# Patient Record
Sex: MALE | Race: WHITE | NOT HISPANIC OR LATINO | Employment: FULL TIME | URBAN - METROPOLITAN AREA
[De-identification: names, ages, dates, MRNs, and addresses within clinical notes are randomized per-mention and may not be internally consistent; named-entity substitution may affect disease eponyms.]

---

## 2017-01-12 ENCOUNTER — TELEPHONE (OUTPATIENT)
Dept: CARDIOLOGY | Facility: CLINIC | Age: 52
End: 2017-01-12

## 2017-02-27 DIAGNOSIS — E78.2 MIXED HYPERLIPIDEMIA: ICD-10-CM

## 2017-02-27 DIAGNOSIS — I10 ESSENTIAL HYPERTENSION, BENIGN: ICD-10-CM

## 2017-02-27 RX ORDER — LISINOPRIL 10 MG/1
10 TABLET ORAL DAILY
Qty: 90 TABLET | Refills: 0 | Status: SHIPPED | OUTPATIENT
Start: 2017-02-27 | End: 2017-05-30

## 2017-02-27 RX ORDER — ROSUVASTATIN CALCIUM 20 MG/1
20 TABLET, COATED ORAL DAILY
Qty: 90 TABLET | Refills: 0 | Status: SHIPPED | OUTPATIENT
Start: 2017-02-27 | End: 2017-05-30

## 2017-03-03 ENCOUNTER — HOSPITAL ENCOUNTER (OUTPATIENT)
Dept: CARDIOLOGY | Facility: CLINIC | Age: 52
Discharge: HOME | End: 2017-03-03
Attending: INTERNAL MEDICINE | Admitting: INTERNAL MEDICINE
Payer: COMMERCIAL

## 2017-03-03 DIAGNOSIS — I25.10 CORONARY ARTERY DISEASE INVOLVING NATIVE CORONARY ARTERY OF NATIVE HEART WITHOUT ANGINA PECTORIS: ICD-10-CM

## 2017-03-03 DIAGNOSIS — I10 ESSENTIAL HYPERTENSION: ICD-10-CM

## 2017-03-03 DIAGNOSIS — E78.2 MIXED HYPERLIPIDEMIA: ICD-10-CM

## 2017-03-03 LAB
ALT SERPL W P-5'-P-CCNC: <5 U/L (ref 5–30)
ANION GAP SERPL CALCULATED.3IONS-SCNC: 15 MMOL/L (ref 6–17)
BUN SERPL-MCNC: 12 MG/DL (ref 7–30)
CALCIUM SERPL-MCNC: 9.3 MG/DL (ref 8.5–10.5)
CHLORIDE SERPL-SCNC: 101 MMOL/L (ref 98–107)
CHOLEST SERPL-MCNC: 133 MG/DL
CO2 SERPL-SCNC: 26 MMOL/L (ref 23–29)
CREAT SERPL-MCNC: 1.39 MG/DL (ref 0.7–1.3)
GFR SERPL CREATININE-BSD FRML MDRD: 54 ML/MIN/1.7M2
GLUCOSE SERPL-MCNC: 126 MG/DL (ref 70–105)
HDLC SERPL-MCNC: 39 MG/DL
LDLC SERPL CALC-MCNC: 70 MG/DL
NONHDLC SERPL-MCNC: 94 MG/DL
POTASSIUM SERPL-SCNC: 4 MMOL/L (ref 3.5–5.1)
SODIUM SERPL-SCNC: 138 MMOL/L (ref 136–145)
TRIGL SERPL-MCNC: 120 MG/DL

## 2017-03-03 PROCEDURE — 80061 LIPID PANEL: CPT | Performed by: INTERNAL MEDICINE

## 2017-03-03 PROCEDURE — 93018 CV STRESS TEST I&R ONLY: CPT | Performed by: INTERNAL MEDICINE

## 2017-03-03 PROCEDURE — 93321 DOPPLER ECHO F-UP/LMTD STD: CPT | Mod: 26 | Performed by: INTERNAL MEDICINE

## 2017-03-03 PROCEDURE — 80048 BASIC METABOLIC PNL TOTAL CA: CPT | Performed by: INTERNAL MEDICINE

## 2017-03-03 PROCEDURE — 84460 ALANINE AMINO (ALT) (SGPT): CPT | Performed by: INTERNAL MEDICINE

## 2017-03-03 PROCEDURE — 40000264 ECHO STRESS TEST WITH LUMASON

## 2017-03-03 PROCEDURE — 25500064 ZZH RX 255 OP 636: Performed by: INTERNAL MEDICINE

## 2017-03-03 PROCEDURE — 93325 DOPPLER ECHO COLOR FLOW MAPG: CPT | Mod: 26 | Performed by: INTERNAL MEDICINE

## 2017-03-03 PROCEDURE — 93016 CV STRESS TEST SUPVJ ONLY: CPT | Performed by: INTERNAL MEDICINE

## 2017-03-03 PROCEDURE — 93350 STRESS TTE ONLY: CPT | Mod: 26 | Performed by: INTERNAL MEDICINE

## 2017-03-03 PROCEDURE — 36415 COLL VENOUS BLD VENIPUNCTURE: CPT | Performed by: INTERNAL MEDICINE

## 2017-03-03 RX ADMIN — SULFUR HEXAFLUORIDE 5 ML: KIT at 11:59

## 2017-03-07 ENCOUNTER — OFFICE VISIT (OUTPATIENT)
Dept: CARDIOLOGY | Facility: CLINIC | Age: 52
End: 2017-03-07
Attending: INTERNAL MEDICINE
Payer: COMMERCIAL

## 2017-03-07 VITALS
HEIGHT: 69 IN | SYSTOLIC BLOOD PRESSURE: 126 MMHG | HEART RATE: 92 BPM | DIASTOLIC BLOOD PRESSURE: 88 MMHG | WEIGHT: 252.6 LBS | BODY MASS INDEX: 37.41 KG/M2

## 2017-03-07 DIAGNOSIS — I25.10 CORONARY ARTERY DISEASE INVOLVING NATIVE CORONARY ARTERY OF NATIVE HEART WITHOUT ANGINA PECTORIS: Primary | ICD-10-CM

## 2017-03-07 DIAGNOSIS — E78.2 MIXED HYPERLIPIDEMIA: ICD-10-CM

## 2017-03-07 DIAGNOSIS — I10 ESSENTIAL HYPERTENSION: ICD-10-CM

## 2017-03-07 PROCEDURE — 99214 OFFICE O/P EST MOD 30 MIN: CPT | Performed by: INTERNAL MEDICINE

## 2017-03-07 NOTE — MR AVS SNAPSHOT
"              After Visit Summary   3/7/2017    Ashutosh Moraes    MRN: [id number]           Patient Information     Date Of Birth          1965        Visit Information        Provider Department      3/7/2017 1:15 PM Mirza Baez MD [facility] HEART [facility]        Today's Diagnoses     Coronary artery disease involving native coronary artery of native heart without angina pectoris    -  1    Mixed hyperlipidemia        Essential hypertension           Follow-ups after your visit        Additional Services     Follow-Up with Cardiologist                 Future tests that were ordered for you today     Open Future Orders        Priority Expected Expires Ordered    Lipid Profile Routine 3/7/2018 4/11/2018 3/7/2017    ALT Routine 3/7/2018 4/11/2018 3/7/2017    Follow-Up with Cardiologist Routine 3/7/2018 7/20/2018 3/7/2017            Who to contact     If you have questions or need follow up information about today's clinic visit or your schedule please contact [facility] directly at [phone].  Normal or non-critical lab and imaging results will be communicated to you by [employer], letter or phone within 4 business days after the clinic has received the results. If you do not hear from us within 7 days, please contact the clinic through [employer] or phone. If you have a critical or abnormal lab result, we will notify you by phone as soon as possible.  Submit refill requests through [employer] or call your pharmacy and they will forward the refill request to us. Please allow 3 business days for your refill to be completed.          Additional Information About Your Visit        [employer] Information     [employer] lets you send messages to your doctor, view your test results, renew your prescriptions, schedule appointments and more. To sign up, go to [email or web address] . Click on \"Log in\" on the left side of the screen, which will take you to " "the Welcome page. Then click on \"Sign up Now\" on the right side of the page.     You will be asked to enter the access code listed below, as well as some personal information. Please follow the directions to create your username and password.     Your access code is: [email or web address]  Expires: 2017  1:53 PM     Your access code will  in 90 days. If you need help or a new code, please call your [facility] clinic or [phone].        Care EveryWhere ID     This is your Care EveryWhere ID. This could be used by other organizations to access your [facility] medical records  [id number]        Your Vitals Were     Pulse Height BMI (Body Mass Index)             92 1.753 m (5' 9\") 37.3 kg/m2          Blood Pressure from Last 3 Encounters:   17 126/88   11/04/15 130/80   08/11/15 (!) 159/121    Weight from Last 3 Encounters:   17 114.6 kg (252 lb 9.6 oz)   11/04/15 110.7 kg (244 lb)   08/11/15 108.9 kg (240 lb)              We Performed the Following     Follow-Up with Cardiologist        Primary Care Provider Office Phone # Fax #    Rachel Snow [phone]       [facility] [address] [facility] [address]        Thank you!     Thank you for choosing [facility]  for your care. Our goal is always to provide you with excellent care. Hearing back from our patients is one way we can continue to improve our services. Please take a few minutes to complete the written survey that you may receive in the mail after your visit with us. Thank you!             Your Updated Medication List - Protect others around you: Learn how to safely use, store and throw away your medicines at www.disposemymeds.org.          This list is accurate as of: 3/7/17  1:53 PM.  Always use your most recent med list.                   Brand Name Dispense Instructions for use    ASPIRIN EC PO      Take 81 mg by mouth daily       lisinopril 10 MG tablet    " PRINIVIL/ZESTRIL    90 tablet    Take 1 tablet (10 mg) by mouth daily       PRILOSEC OTC PO      Take by mouth daily       rosuvastatin 20 MG tablet    CRESTOR    90 tablet    Take 1 tablet (20 mg) by mouth daily

## 2017-03-07 NOTE — LETTER
3/7/2017    Rachel Snow  [facility]   [address]  [place] [address]    RE: Ashutosh Cuellarantonio       Dear Colleague,    I had the opportunity to see Mr. Ashutosh Moraes in Cardiology Clinic today at [facility] in [place] for reevaluation of coronary artery disease with risk factors of hypertension and dyslipidemia.  Mr. Moraes developed exertional jaw pain in 2009 and underwent stress testing suggesting ischemia.  He went on to have a coronary angiogram with a drug-eluting stent placed within an obtuse marginal vessel.  He had no other significant disease.  He has done well since that time without any recurrence of his angina.  He has had some mild discomfort in his mouth and teeth a few times which seems to get better when he drinks water.  These episodes do not occur with exertion and seem to occur randomly.  He is wondering whether it might be dehydration.  He has no chest discomfort, shortness of breath, lightheadedness, palpitations or syncope.      He is taking Crestor 20 mg a day, and his cholesterol numbers are excellent with an LDL of 70, HDL 39, triglycerides 120.  I am a bit concerned about his creatinine, which has jumped up to 1.39 with a BUN of 12 and GFR 54.  I am also concerned about his fasting blood glucose.  He tells me that he fasted for about 12-14 hours, and his glucose was 126.  He goes on to tell me that he drinks at least 2 or 3 sugared sodas a day as well as some [employer]ley[employer] and another sugared drinks at times.  He also eats candy bars and such for snacks while he is at work.  He also likes his cakes and pies and other desserts.  He has a very irregular work schedule which seems to disrupt his eating patterns.  He also has been struggling to get enough exercise but is more active on his job.      PHYSICAL EXAMINATION:  Today his weight is up to 252 pounds, which is up 8 pounds from last year.  His heart rate is 92 and blood pressure 126/88.  His  lungs are clear.  His heart rhythm is regular.  He has no cardiac murmurs or carotid bruits.     Outpatient Encounter Prescriptions as of 3/7/2017   Medication Sig Dispense Refill     Omeprazole Magnesium (PRILOSEC OTC PO) Take by mouth daily       lisinopril (PRINIVIL/ZESTRIL) 10 MG tablet Take 1 tablet (10 mg) by mouth daily 90 tablet 0     rosuvastatin (CRESTOR) 20 MG tablet Take 1 tablet (20 mg) by mouth daily 90 tablet 0     ASPIRIN EC PO Take 81 mg by mouth daily        No facility-administered encounter medications on file as of 3/7/2017.       IMPRESSIONS:  Mr. Ashutosh Moraes is a 51-year-old gentleman with coronary artery disease, hypertension and dyslipidemia.  His blood pressure and cholesterol numbers are well controlled.  Unfortunately, I am concerned that he may be developing diabetes.  His blood glucose is up to 126 and he has some worsening kidney dysfunction.      I strongly encouraged him to eat better, focusing on complex carbohydrates and lean protein rather than such a high-sugar diet.  I am hopeful that he can lose some weight.  He is giving up sugared soda for Lent and hoping to continue that on past Lent as well.  We talked about the relationship between excess weight and development of type 2 diabetes as well as the dietary issues related to diabetes.  I encouraged him to exercise more regularly as well.  I am hopeful that he can modify his lifestyle and reduce his weight enough to help eliminate the issue of diabetes.  I suggested he follow up with you in a couple of months to recheck these issues after he has hydrated better.      I will plan to see him back again myself in 1 year.      Again, thank you for allowing me to participate in the care of your patient.      Sincerely,    DEVON LEE MD     [facility]

## 2017-03-07 NOTE — PROGRESS NOTES
HPI and Plan:   See dictation    Orders Placed This Encounter   Procedures     Lipid Profile     ALT     Follow-Up with Cardiologist       Orders Placed This Encounter   Medications     Omeprazole Magnesium (PRILOSEC OTC PO)     Sig: Take by mouth daily       There are no discontinued medications.      Encounter Diagnoses   Name Primary?     Coronary artery disease involving native coronary artery of native heart without angina pectoris Yes     Mixed hyperlipidemia      Essential hypertension        CURRENT MEDICATIONS:  Current Outpatient Prescriptions   Medication Sig Dispense Refill     Omeprazole Magnesium (PRILOSEC OTC PO) Take by mouth daily       lisinopril (PRINIVIL/ZESTRIL) 10 MG tablet Take 1 tablet (10 mg) by mouth daily 90 tablet 0     rosuvastatin (CRESTOR) 20 MG tablet Take 1 tablet (20 mg) by mouth daily 90 tablet 0     ASPIRIN EC PO Take 81 mg by mouth daily          ALLERGIES     Allergies   Allergen Reactions     Atorvastatin Rash       PAST MEDICAL HISTORY:  Past Medical History   Diagnosis Date     CAD (coronary artery disease)      Cardiac cath 7/2009: RAMANA to OM     Gastro-oesophageal reflux disease      HLD (hyperlipidemia)      Hypertension        PAST SURGICAL HISTORY:  Past Surgical History   Procedure Laterality Date     Heart cath left heart cath  07/02/2009     RAMANA to OM       FAMILY HISTORY:  Family History   Problem Relation Age of Onset     Asthma Mother      Coronary Artery Disease Father        SOCIAL HISTORY:  Social History     Social History     Marital status:      Spouse name: N/A     Number of children: N/A     Years of education: N/A     Social History Main Topics     Smoking status: Never Smoker     Smokeless tobacco: None     Alcohol use No     Drug use: No     Sexual activity: Not Asked     Other Topics Concern     Caffeine Concern No     1 a day     Exercise No     occasional. recently joined ""     Social History Narrative       Review of Systems:  Skin:   "Negative       Eyes:  Positive for glasses    ENT:  Negative      Respiratory:  Positive for sleep apnea;CPAP     Cardiovascular:  Negative;palpitations;chest pain;syncope or near-syncope;cyanosis;lightheadedness;dizziness;exercise intolerance;fatigue;edema      Gastroenterology: Negative      Genitourinary:  Negative      Musculoskeletal:  Negative      Neurologic:  Negative      Psychiatric:  Negative      Heme/Lymph/Imm:  Negative      Endocrine:  Negative        Physical Exam:  Vitals: /88  Pulse 92  Ht 1.753 m (5' 9\")  Wt 114.6 kg (252 lb 9.6 oz)  BMI 37.3 kg/m2    Constitutional:           Skin:           Head:           Eyes:           ENT:           Neck:           Chest:             Cardiac:                    Abdomen:           Vascular:                                          Extremities and Back:                 Neurological:                 CC  Mirza Baez MD   [facility]  [address]  [place] [address]              "

## 2017-03-08 NOTE — PROGRESS NOTES
HISTORY OF PRESENT ILLNESS:  I had the opportunity to see Mr. Ashutosh Moraes in Cardiology Clinic today at [facility] in [place] for reevaluation of coronary artery disease with risk factors of hypertension and dyslipidemia.  Mr. Moraes developed exertional jaw pain in 2009 and underwent stress testing suggesting ischemia.  He went on to have a coronary angiogram with a drug-eluting stent placed within an obtuse marginal vessel.  He had no other significant disease.  He has done well since that time without any recurrence of his angina.  He has had some mild discomfort in his mouth and teeth a few times which seems to get better when he drinks water.  These episodes do not occur with exertion and seem to occur randomly.  He is wondering whether it might be dehydration.  He has no chest discomfort, shortness of breath, lightheadedness, palpitations or syncope.      He is taking Crestor 20 mg a day, and his cholesterol numbers are excellent with an LDL of 70, HDL 39, triglycerides 120.  I am a bit concerned about his creatinine, which has jumped up to 1.39 with a BUN of 12 and GFR 54.  I am also concerned about his fasting blood glucose.  He tells me that he fasted for about 12-14 hours, and his glucose was 126.  He goes on to tell me that he drinks at least 2 or 3 sugared sodas a day as well as some Bentley-A[employer] and another sugared drinks at times.  He also eats candy bars and such for snacks while he is at work.  He also likes his cakes and pies and other desserts.  He has a very irregular work schedule which seems to disrupt his eating patterns.  He also has been struggling to get enough exercise but is more active on his job.      PHYSICAL EXAMINATION:  Today his weight is up to 252 pounds, which is up 8 pounds from last year.  His heart rate is 92 and blood pressure 126/88.  His lungs are clear.  His heart rhythm is regular.  He has no cardiac murmurs or carotid bruits.      IMPRESSIONS:    Ashutosh Bueno is a 51-year-old gentleman with coronary artery disease, hypertension and dyslipidemia.  His blood pressure and cholesterol numbers are well controlled.  Unfortunately, I am concerned that he may be developing diabetes.  His blood glucose is up to 126 and he has some worsening kidney dysfunction.      I strongly encouraged him to eat better, focusing on complex carbohydrates and lean protein rather than such a high-sugar diet.  I am hopeful that he can lose some weight.  He is giving up sugared soda for Lent and hoping to continue that on past Lent as well.  We talked about the relationship between excess weight and development of type 2 diabetes as well as the dietary issues related to diabetes.  I encouraged him to exercise more regularly as well.  I am hopeful that he can modify his lifestyle and reduce his weight enough to help eliminate the issue of diabetes.  I suggested he follow up with you in a couple of months to recheck these issues after he has hydrated better.      I will plan to see him back again myself in 1 year.      cc:   [place] [address]         DEVON LEE MD, [facility]             D: 2017 14:00   T: 2017 23:42   MT: LETY      Name:     ASHUTOSH BUENO   MRN:      [id number]        Account:      [id number]   :      1965           Service Date: 2017      Document: [id number]

## 2017-05-30 DIAGNOSIS — E78.2 MIXED HYPERLIPIDEMIA: ICD-10-CM

## 2017-05-30 DIAGNOSIS — I10 ESSENTIAL HYPERTENSION, BENIGN: ICD-10-CM

## 2017-05-30 RX ORDER — LISINOPRIL 10 MG/1
10 TABLET ORAL DAILY
Qty: 90 TABLET | Refills: 3 | Status: SHIPPED | OUTPATIENT
Start: 2017-05-30 | End: 2018-05-11

## 2017-05-30 RX ORDER — ROSUVASTATIN CALCIUM 20 MG/1
20 TABLET, COATED ORAL DAILY
Qty: 90 TABLET | Refills: 3 | Status: SHIPPED | OUTPATIENT
Start: 2017-05-30 | End: 2018-02-26

## 2018-02-26 DIAGNOSIS — E78.2 MIXED HYPERLIPIDEMIA: ICD-10-CM

## 2018-02-26 RX ORDER — ROSUVASTATIN CALCIUM 20 MG/1
20 TABLET, COATED ORAL DAILY
Qty: 90 TABLET | Refills: 2 | Status: SHIPPED | OUTPATIENT
Start: 2018-02-26 | End: 2018-05-11

## 2018-05-04 ENCOUNTER — TELEPHONE (OUTPATIENT)
Dept: CARDIOLOGY | Facility: CLINIC | Age: 53
End: 2018-05-04

## 2018-05-04 NOTE — TELEPHONE ENCOUNTER
Pt called, he thinks he may need a PA for his generic crestor- [facility] mentioned that to him, however we have not received a fax request. Pt wanted me to send this request for a PA-he has [employer] insurance-message to Central PA Team-mmunns lpn

## 2018-05-07 ENCOUNTER — CARE COORDINATION (OUTPATIENT)
Dept: CARDIOLOGY | Facility: CLINIC | Age: 53
End: 2018-05-07

## 2018-05-07 NOTE — TELEPHONE ENCOUNTER
Central Prior Authorization Team   Phone: [phone]      PA Initiation    Medication: Rosuvastatin Calcium 20MG tablets   Insurance Company: [email or web address] - Phone [phone] Fax [phone]  Pharm[facility] Filling the [id number] - [place], [place] - [address]  Filling Pharmacy Phone: [phone]  Filling Pharmacy Fax:    Start Date: 5/7/2018

## 2018-05-07 NOTE — PROGRESS NOTES
Received denial for rosuvastatin due to the terms of his benefit plan. Pt is due to see  on 5/11/18. Will update  and call patient back with his recommendations. Ingrid Kang RN 3:49 PM 05/07/18

## 2018-05-07 NOTE — TELEPHONE ENCOUNTER
PRIOR AUTHORIZATION DENIED    Medication: Rosuvastatin Calcium 20MG tablets - Denied- Plan Exclusion    Denial Date: 5/7/2018    Denial Rational: Plan Exclusion. Your plan does not cover this drug.   Formulary Alternatives: atorvastatin, ezetimibe-simvastatin, fluvastatin, lovastatin, pravastatin, simvastatin. If you feel there is additional information related to this case that might affect this decision and an appeal is desired, please submit a written letter of medical necessity to our PA Team.        Appeal Information:

## 2018-05-09 NOTE — TELEPHONE ENCOUNTER
Called [facility] pharmacy to find out what the cost is for rosuvastatin 20 mg tabs for 90 day supply. Pharm tech said it is $500.00 for a ninety day supply per patient's plan. Will update  to see if he wants to switch to another statin with lower cost for patient. Ingrid Kang RN 2:18 PM 05/09/18

## 2018-05-10 NOTE — TELEPHONE ENCOUNTER
Contacted patient with 's recommendations with regard to trying atorvastatin or pravastatin due to the high cost of generic Crestor (rosuvastatin). PA for rosuvastatin was denied due to plan exclusion ( not on formulary). I checked with cost at [facility] which was $ 500.00 for a 90 day supply.  called [facility]'s and the cost was $ 575.00 for a ninety day supply. I gave this information to the patient. Pt said he did not tolerate atorvastatin in the past and he would rather discuss this with  at tomorrow's appointment. Ingrid Kang RN 4:40 PM 05/10/18

## 2018-05-10 NOTE — TELEPHONE ENCOUNTER
Ok. I thought it was generic, but that does not mean it's cheap. I double checked and the cash price at [facility] is $575. Fartuny. Let's switch to atorvastatin 40 mg daily. If he has had that before and had problems with it, we will use pravastatin 80 mg daily. Thanks. LATHA

## 2018-05-11 ENCOUNTER — OFFICE VISIT (OUTPATIENT)
Dept: CARDIOLOGY | Facility: CLINIC | Age: 53
End: 2018-05-11
Attending: INTERNAL MEDICINE
Payer: COMMERCIAL

## 2018-05-11 VITALS
HEART RATE: 68 BPM | BODY MASS INDEX: 38.09 KG/M2 | SYSTOLIC BLOOD PRESSURE: 132 MMHG | HEIGHT: 68 IN | DIASTOLIC BLOOD PRESSURE: 96 MMHG | WEIGHT: 251.3 LBS

## 2018-05-11 DIAGNOSIS — I10 ESSENTIAL HYPERTENSION: ICD-10-CM

## 2018-05-11 DIAGNOSIS — I25.10 CORONARY ARTERY DISEASE INVOLVING NATIVE CORONARY ARTERY OF NATIVE HEART WITHOUT ANGINA PECTORIS: Primary | ICD-10-CM

## 2018-05-11 DIAGNOSIS — E78.2 MIXED HYPERLIPIDEMIA: ICD-10-CM

## 2018-05-11 LAB
ALT SERPL W P-5'-P-CCNC: <5 U/L (ref 5–30)
CHOLEST SERPL-MCNC: 165 MG/DL
HDLC SERPL-MCNC: 41 MG/DL
LDLC SERPL CALC-MCNC: 92 MG/DL
NONHDLC SERPL-MCNC: 124 MG/DL
TRIGL SERPL-MCNC: 159 MG/DL

## 2018-05-11 PROCEDURE — 84460 ALANINE AMINO (ALT) (SGPT): CPT | Performed by: INTERNAL MEDICINE

## 2018-05-11 PROCEDURE — 99214 OFFICE O/P EST MOD 30 MIN: CPT | Performed by: INTERNAL MEDICINE

## 2018-05-11 PROCEDURE — 36415 COLL VENOUS BLD VENIPUNCTURE: CPT | Performed by: INTERNAL MEDICINE

## 2018-05-11 PROCEDURE — 80061 LIPID PANEL: CPT | Performed by: INTERNAL MEDICINE

## 2018-05-11 RX ORDER — PRAVASTATIN SODIUM 80 MG/1
80 TABLET ORAL DAILY
Qty: 90 TABLET | Refills: 3 | Status: SHIPPED | OUTPATIENT
Start: 2018-05-11 | End: 2018-05-25

## 2018-05-11 RX ORDER — ROSUVASTATIN CALCIUM 20 MG/1
20 TABLET, COATED ORAL DAILY
Qty: 90 TABLET | Refills: 3 | Status: SHIPPED | OUTPATIENT
Start: 2018-05-11 | End: 2018-05-25

## 2018-05-11 RX ORDER — LISINOPRIL 10 MG/1
10 TABLET ORAL DAILY
Qty: 90 TABLET | Refills: 3 | Status: SHIPPED | OUTPATIENT
Start: 2018-05-11 | End: 2018-05-22

## 2018-05-11 NOTE — PROGRESS NOTES
HPI and Plan:   See dictation    Orders Placed This Encounter   Procedures     Lipid Profile     ALT     Basic metabolic panel     Follow-Up with Cardiologist       Orders Placed This Encounter   Medications     rosuvastatin (CRESTOR) 20 MG tablet     Sig: Take 1 tablet (20 mg) by mouth daily     Dispense:  90 tablet     Refill:  3     pravastatin (PRAVACHOL) 80 MG tablet     Sig: Take 1 tablet (80 mg) by mouth daily     Dispense:  90 tablet     Refill:  3     lisinopril (PRINIVIL/ZESTRIL) 10 MG tablet     Sig: Take 1 tablet (10 mg) by mouth daily     Dispense:  90 tablet     Refill:  3       Medications Discontinued During This Encounter   Medication Reason     rosuvastatin (CRESTOR) 20 MG tablet Reorder     lisinopril (PRINIVIL/ZESTRIL) 10 MG tablet Reorder         Encounter Diagnoses   Name Primary?     Coronary artery disease involving native coronary artery of native heart without angina pectoris Yes     Mixed hyperlipidemia      Essential hypertension        CURRENT MEDICATIONS:  Current Outpatient Prescriptions   Medication Sig Dispense Refill     ASPIRIN EC PO Take 81 mg by mouth daily        lisinopril (PRINIVIL/ZESTRIL) 10 MG tablet Take 1 tablet (10 mg) by mouth daily 90 tablet 3     Omeprazole Magnesium (PRILOSEC OTC PO) Take by mouth daily       pravastatin (PRAVACHOL) 80 MG tablet Take 1 tablet (80 mg) by mouth daily 90 tablet 3     rosuvastatin (CRESTOR) 20 MG tablet Take 1 tablet (20 mg) by mouth daily 90 tablet 3     [DISCONTINUED] lisinopril (PRINIVIL/ZESTRIL) 10 MG tablet Take 1 tablet (10 mg) by mouth daily 90 tablet 3     [DISCONTINUED] rosuvastatin (CRESTOR) 20 MG tablet Take 1 tablet (20 mg) by mouth daily 90 tablet 2       ALLERGIES     Allergies   Allergen Reactions     Atorvastatin Rash       PAST MEDICAL HISTORY:  Past Medical History:   Diagnosis Date     CAD (coronary artery disease)     Cardiac cath 7/2009: RAMANA to OM     Gastro-oesophageal reflux disease      HLD (hyperlipidemia)       "Hypertension        PAST SURGICAL HISTORY:  Past Surgical History:   Procedure Laterality Date     HEART CATH LEFT HEART CATH  07/02/2009    RAMANA to OM       FAMILY HISTORY:  Family History   Problem Relation Age of Onset     Asthma Mother      Coronary Artery Disease Father        SOCIAL HISTORY:  Social History     Social History     Marital status:      Spouse name: N/A     Number of children: N/A     Years of education: N/A     Social History Main Topics     Smoking status: Never Smoker     Smokeless tobacco: Never Used     Alcohol use No     Drug use: No     Sexual activity: Not Asked     Other Topics Concern     Caffeine Concern No     1 a day     Exercise No     occasional. recently joined [employer]     Social History Narrative       Review of Systems:  Skin:  Positive for rash     Eyes:  Positive for glasses    ENT:  Negative      Respiratory:  Positive for sleep apnea currently not wearing CPAP   Cardiovascular:  Negative;palpitations;chest pain;exercise intolerance;lightheadedness;dizziness;syncope or near-syncope;cyanosis;fatigue;edema      Gastroenterology: Negative      Genitourinary:  Negative      Musculoskeletal:  Negative      Neurologic:  Negative      Psychiatric:  Negative      Heme/Lymph/Imm:  Negative      Endocrine:  Negative        Physical Exam:  Vitals: BP (!) 132/96 (BP Location: Right arm, Cuff Size: Adult Large)  Pulse 68  Ht 1.715 m (5' 7.5\")  Wt 114 kg (251 lb 4.8 oz)  BMI 38.78 kg/m2    Constitutional:  cooperative, alert and oriented, well developed, well nourished, in no acute distress        Skin:  warm and dry to the touch, no apparent skin lesions or masses noted          Head:  normocephalic, no masses or lesions        Eyes:  pupils equal and round, conjunctivae and lids unremarkable, sclera white, no xanthalasma, EOMS intact, no nystagmus        Lymph:      ENT:  no pallor or cyanosis, dentition good        Neck:           Respiratory:  normal breath sounds, clear " to auscultation, normal A-P diameter, normal symmetry, normal respiratory excursion, no use of accessory muscles         Cardiac: regular rhythm, normal S1/S2, no S3 or S4, apical impulse not displaced, no murmurs, gallops or rubs                pulses full and equal, no bruits auscultated                                        GI:  abdomen soft;BS normoactive        Extremities and Muscular Skeletal:  no deformities, clubbing, cyanosis, erythema observed;no edema              Neurological:  affect appropriate;no gross motor deficits        Psych:  Alert and Oriented x 3        CC  Mirza Baez MD  [address]  [place] [address]

## 2018-05-11 NOTE — LETTER
5/11/2018      Kathy Chandra MD  [facility] [address]  [facility] [address]      RE: Ashutosh Moraes       Dear Colleague,    I had the pleasure of seeing Ashutosh Moraes in the [facility].    Service Date: 05/11/2018      HISTORY OF PRESENT ILLNESS:  I had the opportunity to see Mr. Ashutosh Moraes in Cardiology Clinic today at the [facility] in [place] for reevaluation of coronary artery disease, hypertension and dyslipidemia.  I see that Mr. Moraes had an abnormal hemoglobin A1c in November of 6.3, indicating the presence of type 2 diabetes as well.  Fortunately, this seems to be diet-controlled at this point, although I am not convinced that it is controlled well.        In any case, he has a history of exertional jaw pain in 2009, which was due to severe disease within an obtuse marginal vessel treated with angioplasty and stenting.  He has done well since that time without any recurrence of that type of angina.  He has no other chest discomfort, shortness of breath, palpitations, lightheadedness or syncope issues at this point.      He has been unable to get his Crestor affordably recently.  He had been on 20 mg a day.  He had a previous intolerance to atorvastatin due to a rash.  His cholesterol numbers on Crestor had been very good.      He had been using a lot of sugary drinks and admits that his diet is not as good as it could be.  He has not been getting enough exercise because of very active work schedule.      PHYSICAL EXAMINATION:  His blood pressure was 132/96, heart rate 68 and weight 251 pounds, which is stable.  His lungs are clear.  Heart rhythm is regular.  There are no cardiac murmurs or carotid bruits.      His creatinine recently was up to 1.39 and his LDL was up to 92 with an HDL of 41, triglycerides 159.      IMPRESSIONS:  Mr. Ashutosh Moraes is a 52-year-old gentleman with hypertension, dyslipidemia and diet-managed type 2  diabetes with a history of coronary artery disease with angioplasty and stenting of an obtuse marginal artery about 9 years ago.  Fortunately, he has not had any recurrent coronary disease problems, but we have been trying to focus only on risk factor modification.      I again recommended to him that he exercise regularly, reduce his intake of carbohydrates and salt and stay on his current medication program.  The Crestor may be a challenge because of changing insurance issues.  His cost for Crestor is extremely high, but he will look into a discount savings card and see if that will help.  If not, I will have him switch to pravastatin 80 mg daily and then recheck his cholesterol numbers this fall to evaluate the efficacy.  He can certainly do that through his primary care office.  I will plan to see him back again in 1 year for reevaluation.      cc:      Kathy Chandra MD    [facility]   [address] [place] [address]         DEVON LEE MD, formerly [facility]             D: 2018   T: 2018   MT:       Name:     LAUREN BUENO   MRN:      [id number]        Account:      [id number]   :      1965           Service Date: 2018      Document: [id number]         Outpatient Encounter Prescriptions as of 2018   Medication Sig Dispense Refill     ASPIRIN EC PO Take 81 mg by mouth daily        lisinopril (PRINIVIL/ZESTRIL) 10 MG tablet Take 1 tablet (10 mg) by mouth daily 90 tablet 3     Omeprazole Magnesium (PRILOSEC OTC PO) Take by mouth daily       pravastatin (PRAVACHOL) 80 MG tablet Take 1 tablet (80 mg) by mouth daily 90 tablet 3     rosuvastatin (CRESTOR) 20 MG tablet Take 1 tablet (20 mg) by mouth daily 90 tablet 3     [DISCONTINUED] lisinopril (PRINIVIL/ZESTRIL) 10 MG tablet Take 1 tablet (10 mg) by mouth daily 90 tablet 3     [DISCONTINUED] rosuvastatin (CRESTOR) 20 MG tablet Take 1 tablet (20 mg) by mouth daily 90 tablet 2     No facility-administered encounter  medications on file as of 5/11/2018.        Again, thank you for allowing me to participate in the care of your patient.      Sincerely,    DEVON LEE MD     [facility]

## 2018-05-11 NOTE — LETTER
5/11/2018    Kathy Chandra MD  [facility] [address]  [facility] [address]    RE: Ashutosh Moraes       Dear Colleague,    I had the pleasure of seeing Ashutosh Moraes in the [facility].    HPI and Plan:   See dictation    Orders Placed This Encounter   Procedures     Lipid Profile     ALT     Basic metabolic panel     Follow-Up with Cardiologist       Orders Placed This Encounter   Medications     rosuvastatin (CRESTOR) 20 MG tablet     Sig: Take 1 tablet (20 mg) by mouth daily     Dispense:  90 tablet     Refill:  3     pravastatin (PRAVACHOL) 80 MG tablet     Sig: Take 1 tablet (80 mg) by mouth daily     Dispense:  90 tablet     Refill:  3     lisinopril (PRINIVIL/ZESTRIL) 10 MG tablet     Sig: Take 1 tablet (10 mg) by mouth daily     Dispense:  90 tablet     Refill:  3       Medications Discontinued During This Encounter   Medication Reason     rosuvastatin (CRESTOR) 20 MG tablet Reorder     lisinopril (PRINIVIL/ZESTRIL) 10 MG tablet Reorder         Encounter Diagnoses   Name Primary?     Coronary artery disease involving native coronary artery of native heart without angina pectoris Yes     Mixed hyperlipidemia      Essential hypertension        CURRENT MEDICATIONS:  Current Outpatient Prescriptions   Medication Sig Dispense Refill     ASPIRIN EC PO Take 81 mg by mouth daily        lisinopril (PRINIVIL/ZESTRIL) 10 MG tablet Take 1 tablet (10 mg) by mouth daily 90 tablet 3     Omeprazole Magnesium (PRILOSEC OTC PO) Take by mouth daily       pravastatin (PRAVACHOL) 80 MG tablet Take 1 tablet (80 mg) by mouth daily 90 tablet 3     rosuvastatin (CRESTOR) 20 MG tablet Take 1 tablet (20 mg) by mouth daily 90 tablet 3     [DISCONTINUED] lisinopril (PRINIVIL/ZESTRIL) 10 MG tablet Take 1 tablet (10 mg) by mouth daily 90 tablet 3     [DISCONTINUED] rosuvastatin (CRESTOR) 20 MG tablet Take 1 tablet (20 mg) by mouth daily 90 tablet 2       ALLERGIES     Allergies   Allergen  "Reactions     Atorvastatin Rash       PAST MEDICAL HISTORY:  Past Medical History:   Diagnosis Date     CAD (coronary artery disease)     Cardiac cath 7/2009: RAMANA to OM     Gastro-oesophageal reflux disease      HLD (hyperlipidemia)      Hypertension        PAST SURGICAL HISTORY:  Past Surgical History:   Procedure Laterality Date     HEART CATH LEFT HEART CATH  07/02/2009    RAMANA to OM       FAMILY HISTORY:  Family History   Problem Relation Age of Onset     Asthma Mother      Coronary Artery Disease Father        SOCIAL HISTORY:  Social History     Social History     Marital status:      Spouse name: N/A     Number of children: N/A     Years of education: N/A     Social History Main Topics     Smoking status: Never Smoker     Smokeless tobacco: Never Used     Alcohol use No     Drug use: No     Sexual activity: Not Asked     Other Topics Concern     Caffeine Concern No     1 a day     Exercise No     occasional. recently joined [employer]     Social History Narrative       Review of Systems:  Skin:  Positive for rash     Eyes:  Positive for glasses    ENT:  Negative      Respiratory:  Positive for sleep apnea currently not wearing CPAP   Cardiovascular:  Negative;palpitations;chest pain;exercise intolerance;lightheadedness;dizziness;syncope or near-syncope;cyanosis;fatigue;edema      Gastroenterology: Negative      Genitourinary:  Negative      Musculoskeletal:  Negative      Neurologic:  Negative      Psychiatric:  Negative      Heme/Lymph/Imm:  Negative      Endocrine:  Negative        Physical Exam:  Vitals: BP (!) 132/96 (BP Location: Right arm, Cuff Size: Adult Large)  Pulse 68  Ht 1.715 m (5' 7.5\")  Wt 114 kg (251 lb 4.8 oz)  BMI 38.78 kg/m2    Constitutional:  cooperative, alert and oriented, well developed, well nourished, in no acute distress        Skin:  warm and dry to the touch, no apparent skin lesions or masses noted          Head:  normocephalic, no masses or lesions        Eyes:  pupils " equal and round, conjunctivae and lids unremarkable, sclera white, no xanthalasma, EOMS intact, no nystagmus        Lymph:      ENT:  no pallor or cyanosis, dentition good        Neck:           Respiratory:  normal breath sounds, clear to auscultation, normal A-P diameter, normal symmetry, normal respiratory excursion, no use of accessory muscles         Cardiac: regular rhythm, normal S1/S2, no S3 or S4, apical impulse not displaced, no murmurs, gallops or rubs                pulses full and equal, no bruits auscultated                                        GI:  abdomen soft;BS normoactive        Extremities and Muscular Skeletal:  no deformities, clubbing, cyanosis, erythema observed;no edema              Neurological:  affect appropriate;no gross motor deficits        Psych:  Alert and Oriented x 3        CC  Mirza Baez MD  [address]  [place] [address]                Thank you for allowing me to participate in the care of your patient.      Sincerely,     MIRZA BAEZ MD     [facility]    cc:   Mirza Baez MD  [address]  [place] [address]

## 2018-05-11 NOTE — PROGRESS NOTES
Service Date: 05/11/2018      HISTORY OF PRESENT ILLNESS:  I had the opportunity to see Mr. Ashutosh Moraes in Cardiology Clinic today at the [facility] in [place] for reevaluation of coronary artery disease, hypertension and dyslipidemia.  I see that Mr. Moraes had an abnormal hemoglobin A1c in November of 6.3, indicating the presence of type 2 diabetes as well.  Fortunately, this seems to be diet-controlled at this point, although I am not convinced that it is controlled well.        In any case, he has a history of exertional jaw pain in 2009, which was due to severe disease within an obtuse marginal vessel treated with angioplasty and stenting.  He has done well since that time without any recurrence of that type of angina.  He has no other chest discomfort, shortness of breath, palpitations, lightheadedness or syncope issues at this point.      He has been unable to get his Crestor affordably recently.  He had been on 20 mg a day.  He had a previous intolerance to atorvastatin due to a rash.  His cholesterol numbers on Crestor had been very good.      He had been using a lot of sugary drinks and admits that his diet is not as good as it could be.  He has not been getting enough exercise because of very active work schedule.      PHYSICAL EXAMINATION:  His blood pressure was 132/96, heart rate 68 and weight 251 pounds, which is stable.  His lungs are clear.  Heart rhythm is regular.  There are no cardiac murmurs or carotid bruits.      His creatinine recently was up to 1.39 and his LDL was up to 92 with an HDL of 41, triglycerides 159.      IMPRESSIONS:  Mr. Ashutosh Moraes is a 52-year-old gentleman with hypertension, dyslipidemia and diet-managed type 2 diabetes with a history of coronary artery disease with angioplasty and stenting of an obtuse marginal artery about 9 years ago.  Fortunately, he has not had any recurrent coronary disease problems, but we have been trying to focus only on risk  factor modification.      I again recommended to him that he exercise regularly, reduce his intake of carbohydrates and salt and stay on his current medication program.  The Crestor may be a challenge because of changing insurance issues.  His cost for Crestor is extremely high, but he will look into a discount savings card and see if that will help.  If not, I will have him switch to pravastatin 80 mg daily and then recheck his cholesterol numbers this fall to evaluate the efficacy.  He can certainly do that through his primary care office.  I will plan to see him back again in 1 year for reevaluation.      cc:      Kathy Chandra MD    [facility]   [address] [place] [address]         [facility]EE [facility], [facility]             D: 2018   T: 2018   MT: DAMIEN      Name:     LAUREN BUENO   MRN:      [id number]        Account:      [id number]   :      1965           Service Date: 2018      Document: [id number]

## 2018-05-11 NOTE — MR AVS SNAPSHOT
"              After Visit Summary   5/11/2018    Ashutosh Moraes    MRN: [id number]           Patient Information     Date Of Birth          1965        Visit Information        Provider Department      5/11/2018 9:15 AM Mirza Baez MD [facility]        Today's Diagnoses     Mixed hyperlipidemia    -  1    Coronary artery disease involving native coronary artery of native heart without angina pectoris        Essential hypertension        Essential hypertension, benign           Follow-ups after your visit        Who to contact     If you have questions or need follow up information about today's clinic visit or your schedule please contact [facility] directly at [phone].  Normal or non-critical lab and imaging results will be communicated to you by MyChart, letter or phone within 4 business days after the clinic has received the results. If you do not hear from us within 7 days, please contact the clinic through [email or web address] or phone. If you have a critical or abnormal lab result, we will notify you by phone as soon as possible.  Submit refill requests through [employer] or call your pharmacy and they will forward the refill request to us. Please allow 3 business days for your refill to be completed.          Additional Information About Your Visit        MyChart Information     [employer] lets you send messages to your doctor, view your test results, renew your prescriptions, schedule appointments and more. To sign up, go to [email or web address] . Click on \"Log in\" on the left side of the screen, which will take you to the Welcome page. Then click on \"Sign up Now\" on the right side of the page.     You will be asked to enter the access code listed below, as well as some personal information. Please follow the directions to create your username and password.     Your access code is: [email or web address]  Expires: 8/9/2018 10:10 AM     Your " "access code will  in 90 days. If you need help or a new code, please call your [place] clinic or [phone].        Care EveryWhere ID     This is your Care EveryWhere ID. This could be used by other organizations to access your [place] medical records  [id number]        Your Vitals Were     Pulse Height BMI (Body Mass Index)             68 1.715 m (5' 7.5\") 38.78 kg/m2          Blood Pressure from Last 3 Encounters:   18 (!) 132/96   17 126/88   11/04/15 130/80    Weight from Last 3 Encounters:   18 114 kg (251 lb 4.8 oz)   17 114.6 kg (252 lb 9.6 oz)   11/04/15 110.7 kg (244 lb)              We Performed the Following     Follow-Up with Cardiologist          Today's Medication Changes          These changes are accurate as of 18 10:10 AM.  If you have any questions, ask your nurse or doctor.               Start taking these medicines.        Dose/Directions    pravastatin 80 MG tablet   Commonly known as:  PRAVACHOL   Used for:  Mixed hyperlipidemia   Started by:  Mirza Baez MD        Dose:  80 mg   Take 1 tablet (80 mg) by mouth daily   Quantity:  90 tablet   Refills:  3            Where to get your medicines      These medications were sent to [facility] [address] [place] [address] AT [address] [address]     Phone:  [phone]     lisinopril 10 MG tablet         Some of these will need a paper prescription and others can be bought over the counter.  Ask your nurse if you have questions.     Bring a paper prescription for each of these medications     pravastatin 80 MG tablet    rosuvastatin 20 MG tablet                Primary Care Provider Office Phone # Fax #    Kathy Chandra [phone]       [facility] [address] [facility] [address]        Equal Access to Services     CYNTHIA CORADO AH: Hadii [address], teresa caballeroadalui, qaybta " adolfo hodgenatalie hdz ah. So [facility] [phone].    ATENCIÓN: Si olman elkins, tiene a vasquez disposición servicios gratuitos de asistencia lingüística. Lynn al [phone].    We comply with applicable federal civil rights laws and [place] laws. We do not discriminate on the basis of race, color, national origin, age, disability, sex, sexual orientation, or gender identity.            Thank you!     Thank you for choosing [facility]  for your care. Our goal is always to provide you with excellent care. Hearing back from our patients is one way we can continue to improve our services. Please take a few minutes to complete the written survey that you may receive in the mail after your visit with us. Thank you!             Your Updated Medication List - Protect others around you: Learn how to safely use, store and throw away your medicines at www.disposemymeds.org.          This list is accurate as of 5/11/18 10:10 AM.  Always use your most recent med list.                   Brand Name Dispense Instructions for use Diagnosis    ASPIRIN EC PO      Take 81 mg by mouth daily        lisinopril 10 MG tablet    PRINIVIL/ZESTRIL    90 tablet    Take 1 tablet (10 mg) by mouth daily    Essential hypertension, benign       pravastatin 80 MG tablet    PRAVACHOL    90 tablet    Take 1 tablet (80 mg) by mouth daily    Mixed hyperlipidemia       PRILOSEC OTC PO      Take by mouth daily        rosuvastatin 20 MG tablet    CRESTOR    90 tablet    Take 1 tablet (20 mg) by mouth daily    Mixed hyperlipidemia

## 2018-05-15 ENCOUNTER — CARE COORDINATION (OUTPATIENT)
Dept: CARDIOLOGY | Facility: CLINIC | Age: 53
End: 2018-05-15

## 2018-05-15 NOTE — PROGRESS NOTES
I received fax from [facility]'s pharmacy stating that crestor/rosuvastatin is not covered by pt's insurance. I left message for pt to call back to verify what he is taking.  provided pt with coupon card at 5/11 OV, but said if it is too expensive pt should start pravastatin 80 mg daily instead, and have recheck flp this fall. Shagufta CRUM

## 2018-05-22 DIAGNOSIS — I10 HTN (HYPERTENSION): Primary | ICD-10-CM

## 2018-05-22 RX ORDER — LISINOPRIL 10 MG/1
10 TABLET ORAL DAILY
Qty: 90 TABLET | Refills: 3 | Status: SHIPPED | OUTPATIENT
Start: 2018-05-22 | End: 2018-05-25

## 2018-05-25 DIAGNOSIS — E78.2 MIXED HYPERLIPIDEMIA: ICD-10-CM

## 2018-05-25 DIAGNOSIS — I10 HTN (HYPERTENSION): ICD-10-CM

## 2018-05-25 DIAGNOSIS — I25.10 CAD (CORONARY ARTERY DISEASE): Primary | ICD-10-CM

## 2018-05-25 DIAGNOSIS — K21.9 ESOPHAGEAL REFLUX: ICD-10-CM

## 2018-05-25 RX ORDER — ROSUVASTATIN CALCIUM 20 MG/1
20 TABLET, COATED ORAL DAILY
Qty: 90 TABLET | Refills: 3 | Status: SHIPPED | OUTPATIENT
Start: 2018-05-25 | End: 2020-06-20

## 2018-05-25 RX ORDER — LISINOPRIL 10 MG/1
10 TABLET ORAL DAILY
Qty: 90 TABLET | Refills: 3 | Status: SHIPPED | OUTPATIENT
Start: 2018-05-25 | End: 2020-06-20

## 2018-05-25 RX ORDER — PRAVASTATIN SODIUM 80 MG/1
80 TABLET ORAL DAILY
Qty: 90 TABLET | Refills: 3 | Status: SHIPPED | OUTPATIENT
Start: 2018-05-25 | End: 2020-02-16

## 2018-07-05 NOTE — PROGRESS NOTES
I left message for pt to call back and clarify which statin medication he is currently taking. Shagufta CRUM July 5, 2018, 4:10 PM

## 2020-02-16 ENCOUNTER — HOSPITAL ENCOUNTER (EMERGENCY)
Facility: CLINIC | Age: 55
Discharge: HOME | End: 2020-02-16
Attending: EMERGENCY MEDICINE | Admitting: EMERGENCY MEDICINE
Payer: COMMERCIAL

## 2020-02-16 ENCOUNTER — APPOINTMENT (OUTPATIENT)
Dept: CT IMAGING | Facility: CLINIC | Age: 55
End: 2020-02-16
Attending: EMERGENCY MEDICINE
Payer: COMMERCIAL

## 2020-02-16 VITALS
WEIGHT: 250 LBS | DIASTOLIC BLOOD PRESSURE: 88 MMHG | RESPIRATION RATE: 20 BRPM | BODY MASS INDEX: 38.58 KG/M2 | HEART RATE: 71 BPM | TEMPERATURE: 98 F | OXYGEN SATURATION: 98 % | SYSTOLIC BLOOD PRESSURE: 158 MMHG

## 2020-02-16 DIAGNOSIS — N18.9 CHRONIC RENAL IMPAIRMENT, UNSPECIFIED CKD STAGE: ICD-10-CM

## 2020-02-16 DIAGNOSIS — N23 RENAL COLIC ON RIGHT SIDE: ICD-10-CM

## 2020-02-16 DIAGNOSIS — N20.1 URETEROLITHIASIS: ICD-10-CM

## 2020-02-16 DIAGNOSIS — R31.29 MICROSCOPIC HEMATURIA: ICD-10-CM

## 2020-02-16 LAB
ALBUMIN UR-MCNC: 20 MG/DL
AMORPH CRY #/AREA URNS HPF: ABNORMAL /HPF
ANION GAP SERPL CALCULATED.3IONS-SCNC: 5 MMOL/L (ref 3–14)
APPEARANCE UR: CLEAR
BACTERIA #/AREA URNS HPF: ABNORMAL /HPF
BASOPHILS # BLD AUTO: 0.1 10E9/L (ref 0–0.2)
BASOPHILS NFR BLD AUTO: 0.9 %
BILIRUB UR QL STRIP: NEGATIVE
BUN SERPL-MCNC: 20 MG/DL (ref 7–30)
CALCIUM SERPL-MCNC: 9.1 MG/DL (ref 8.5–10.1)
CHLORIDE SERPL-SCNC: 103 MMOL/L (ref 94–109)
CO2 SERPL-SCNC: 30 MMOL/L (ref 20–32)
COLOR UR AUTO: ABNORMAL
CREAT SERPL-MCNC: 1.65 MG/DL (ref 0.66–1.25)
DIFFERENTIAL METHOD BLD: NORMAL
EOSINOPHIL # BLD AUTO: 0.3 10E9/L (ref 0–0.7)
EOSINOPHIL NFR BLD AUTO: 3 %
ERYTHROCYTE [DISTWIDTH] IN BLOOD BY AUTOMATED COUNT: 12.4 % (ref 10–15)
GFR SERPL CREATININE-BSD FRML MDRD: 47 ML/MIN/{1.73_M2}
GLUCOSE SERPL-MCNC: 244 MG/DL (ref 70–99)
GLUCOSE UR STRIP-MCNC: NEGATIVE MG/DL
HCT VFR BLD AUTO: 43.1 % (ref 40–53)
HGB BLD-MCNC: 14 G/DL (ref 13.3–17.7)
HGB UR QL STRIP: ABNORMAL
HYALINE CASTS #/AREA URNS LPF: 1 /LPF (ref 0–2)
IMM GRANULOCYTES # BLD: 0 10E9/L (ref 0–0.4)
IMM GRANULOCYTES NFR BLD: 0.4 %
KETONES UR STRIP-MCNC: NEGATIVE MG/DL
LEUKOCYTE ESTERASE UR QL STRIP: NEGATIVE
LYMPHOCYTES # BLD AUTO: 2.5 10E9/L (ref 0.8–5.3)
LYMPHOCYTES NFR BLD AUTO: 23.1 %
MCH RBC QN AUTO: 29 PG (ref 26.5–33)
MCHC RBC AUTO-ENTMCNC: 32.5 G/DL (ref 31.5–36.5)
MCV RBC AUTO: 89 FL (ref 78–100)
MONOCYTES # BLD AUTO: 0.7 10E9/L (ref 0–1.3)
MONOCYTES NFR BLD AUTO: 6.1 %
MUCOUS THREADS #/AREA URNS LPF: PRESENT /LPF
NEUTROPHILS # BLD AUTO: 7.3 10E9/L (ref 1.6–8.3)
NEUTROPHILS NFR BLD AUTO: 66.5 %
NITRATE UR QL: NEGATIVE
NRBC # BLD AUTO: 0 10*3/UL
NRBC BLD AUTO-RTO: 0 /100
PH UR STRIP: 5.5 PH (ref 5–7)
PLATELET # BLD AUTO: 209 10E9/L (ref 150–450)
POTASSIUM SERPL-SCNC: 3.4 MMOL/L (ref 3.4–5.3)
RBC # BLD AUTO: 4.83 10E12/L (ref 4.4–5.9)
RBC #/AREA URNS AUTO: >182 /HPF (ref 0–2)
SODIUM SERPL-SCNC: 138 MMOL/L (ref 133–144)
SOURCE: ABNORMAL
SP GR UR STRIP: 1.02 (ref 1–1.03)
UROBILINOGEN UR STRIP-MCNC: NORMAL MG/DL (ref 0–2)
WBC # BLD AUTO: 10.9 10E9/L (ref 4–11)
WBC #/AREA URNS AUTO: 2 /HPF (ref 0–5)
YEAST #/AREA URNS HPF: ABNORMAL /HPF

## 2020-02-16 PROCEDURE — 99285 EMERGENCY DEPT VISIT HI MDM: CPT | Mod: 25

## 2020-02-16 PROCEDURE — 81001 URINALYSIS AUTO W/SCOPE: CPT | Performed by: EMERGENCY MEDICINE

## 2020-02-16 PROCEDURE — 25800030 ZZH RX IP 258 OP 636: Performed by: EMERGENCY MEDICINE

## 2020-02-16 PROCEDURE — 96375 TX/PRO/DX INJ NEW DRUG ADDON: CPT

## 2020-02-16 PROCEDURE — 80048 BASIC METABOLIC PNL TOTAL CA: CPT | Performed by: EMERGENCY MEDICINE

## 2020-02-16 PROCEDURE — 85025 COMPLETE CBC W/AUTO DIFF WBC: CPT | Performed by: EMERGENCY MEDICINE

## 2020-02-16 PROCEDURE — 25000128 H RX IP 250 OP 636: Performed by: EMERGENCY MEDICINE

## 2020-02-16 PROCEDURE — 96361 HYDRATE IV INFUSION ADD-ON: CPT

## 2020-02-16 PROCEDURE — 96374 THER/PROPH/DIAG INJ IV PUSH: CPT

## 2020-02-16 PROCEDURE — 74176 CT ABD & PELVIS W/O CONTRAST: CPT

## 2020-02-16 RX ORDER — OXYCODONE HYDROCHLORIDE 5 MG/1
5 TABLET ORAL EVERY 4 HOURS PRN
Qty: 15 TABLET | Refills: 0 | Status: SHIPPED | OUTPATIENT
Start: 2020-02-16 | End: 2020-02-19

## 2020-02-16 RX ORDER — MORPHINE SULFATE 4 MG/ML
4 INJECTION, SOLUTION INTRAMUSCULAR; INTRAVENOUS
Status: DISCONTINUED | OUTPATIENT
Start: 2020-02-16 | End: 2020-02-16 | Stop reason: HOSPADM

## 2020-02-16 RX ORDER — ONDANSETRON 4 MG/1
4 TABLET, ORALLY DISINTEGRATING ORAL EVERY 6 HOURS PRN
Qty: 12 TABLET | Refills: 0 | Status: SHIPPED | OUTPATIENT
Start: 2020-02-16 | End: 2020-06-20

## 2020-02-16 RX ORDER — ONDANSETRON 2 MG/ML
4 INJECTION INTRAMUSCULAR; INTRAVENOUS
Status: COMPLETED | OUTPATIENT
Start: 2020-02-16 | End: 2020-02-16

## 2020-02-16 RX ORDER — KETOROLAC TROMETHAMINE 15 MG/ML
15 INJECTION, SOLUTION INTRAMUSCULAR; INTRAVENOUS ONCE
Status: COMPLETED | OUTPATIENT
Start: 2020-02-16 | End: 2020-02-16

## 2020-02-16 RX ADMIN — KETOROLAC TROMETHAMINE 15 MG: 15 INJECTION, SOLUTION INTRAMUSCULAR; INTRAVENOUS at 03:05

## 2020-02-16 RX ADMIN — ONDANSETRON 4 MG: 2 INJECTION INTRAMUSCULAR; INTRAVENOUS at 03:05

## 2020-02-16 RX ADMIN — SODIUM CHLORIDE 1000 ML: 9 INJECTION, SOLUTION INTRAVENOUS at 03:06

## 2020-02-16 RX ADMIN — MORPHINE SULFATE 4 MG: 4 INJECTION INTRAVENOUS at 03:06

## 2020-02-16 ASSESSMENT — ENCOUNTER SYMPTOMS
VOMITING: 1
BLOOD IN STOOL: 0
FEVER: 0
HEMATURIA: 0
ABDOMINAL PAIN: 1
FREQUENCY: 0
DIFFICULTY URINATING: 0
DYSURIA: 0

## 2020-02-16 NOTE — ED PROVIDER NOTES
History     Chief Complaint:  Abdominal Pain    HPI  Ashutosh Moraes is a 54 year old male with a history of an appendectomy and a kidney stone 20 years ago who presents to the emergency department for evaluation of lower abdominal/flank pain. He states that he developed this pain suddenly 3 hours ago and it has been worsening since then. He had a bowel movement and forced himself to vomit once but this did not relieve the pain. He denies any urinary urgency, difficulty urinating, dysuria, or hematuria.     Allergies:  Atorvastatin    Medications:    Asprin  Lisinopril  Crestor    Past Medical History:    HLD   CAD  Hypertension   GERD  Kidney stone    Past Surgical History:    Heart Cath Left   appendectomy    Family History:    Asthma  Coronary Artery Disease     Social History:  The patient arrives with wife  Smoking: never  Alcohol use: no  PCP: Kathy Chandra  Marital Status:        Review of Systems   Constitutional: Negative for fever.   Gastrointestinal: Positive for abdominal pain and vomiting. Negative for blood in stool.   Genitourinary: Negative for difficulty urinating, dysuria, frequency and hematuria.   All other systems reviewed and are negative.      Physical Exam     Patient Vitals for the past 24 hrs:   BP Temp Temp src Heart Rate Resp SpO2 Weight   02/16/20 0422  158/88 -- -- 71 20 98 % --   02/16/20 0248  162/98 98  F (36.7  C) Temporal 78 18 100 % 113.4 kg (250 lb)     Physical Exam  Nursing note and vitals reviewed.  Constitutional: Cooperative. Diaphoretic, uncomfortable appearing.   HENT:   Mouth/Throat: Mucous membranes are normal.   Cardiovascular: Normal rate, regular rhythm and normal heart sounds.  No murmur.  Pulmonary/Chest: Effort normal and breath sounds normal. No respiratory distress. No wheezes. No rales.   Abdominal: Soft. Normal appearance and bowel sounds are normal. No distension. There is no tendernss.   Neurological: Alert. Oriented x4  Skin: Skin is warm and dry.    Psychiatric: Normal mood and affect.     Emergency Department Course     Imaging:  Radiology findings were communicated with the patient who voiced understanding of the findings.    CT Abdomen Pelvis w/o Contrast  IMPRESSION:   1.  Mild right hydronephrosis.  2.  There is a 3 x 3 x 3 mm mildly obstructing proximal right ureteral calculus L3 level.  3.  Bilateral renal lithiasis right greater than left.  4.  Previous appendectomy.  5.  Atherosclerosis  Reading per radiology    Laboratory:  Laboratory findings were communicated with the patient who voiced understanding of the findings.    CBC:  o/w WNL. (WBC 10.9, HGB 14.0, )   CMP: Glucose 244 (H), GFR 47 (L), o/w WNL (Creatinine: 1.65 (H))    UA: blood moderate, protein albumin 20, RBC/HPF >182 (H), bacteria few, yeast urine, mucus present, amorphous crystals o/w WNL    Interventions:  0305 Zofran 4mg IV injection   0305 Toradol, 15 mg, IV  0306 Morphine, 4 mg, IV injection  0306 NS 1L IV Bolus    Emergency Department Course:  Past medical records, nursing notes, and vitals reviewed.  0258: I performed an exam of the patient and obtained history, as documented above.     IV was inserted and blood was drawn for laboratory testing, results above.  The patient was sent for a CT while in the emergency department, findings above    0420: I rechecked the patient. Explained findings to patient.    I personally reviewed the laboratory and imaging results with the Patient and answered all related questions prior to discharge.     Findings and plan explained to the Patient. Patient discharged home with instructions regarding supportive care, medications, and reasons to return. The importance of close follow-up was reviewed.   Impression & Plan      Medical Decision Making:  Ashutosh Moraes is a 54 year old male who presents with flank pain. A broad differential diagnosis was considered including diverticulitis, ureterolithiasis, tumor, colitis, cholecystitis,  aneurysm, dissection, volvulus, appendicitis, splenic issue, stomach pathology, ulcer, hydronephrosis, UTI, pyelonephritis amongst many other etiologies.   Signs and symptoms consistent with ureterolithiasis. This was confirmed with CT imaging.  Patients pain is controlled in ED.  No signs of infected stone.  Patient is hemodynamically stable in ED. Plan is home with abdominal pain recheck by primary care physician or return to ED if symptoms worsen.  Return for fevers greater than 102, increasing pain, other new symptoms develop.  Ureterolithiasis precautions for home.  Questions were answered.     Diagnosis:    ICD-10-CM   1. Renal colic on right side N23   2. Microscopic hematuria R31.29   3. Ureterolithiasis N20.1   4. Chronic renal impairment N18.9       Disposition:   discharged to home    Discharge Medications:     Medication List      Started    ondansetron 4 MG ODT tab  Commonly known as:  Zofran ODT  4 mg, Oral, EVERY 6 HOURS PRN     oxyCODONE 5 MG tablet  Commonly known as:  ROXICODONE  5 mg, Oral, EVERY 4 HOURS PRN            Scribe Disclosure:  Loretta MARIN, am serving as a scribe at 2:58 AM on 2/16/2020 to document services personally performed by Daniel Taylor MD based on my observations and the provider's statements to me.    [facility] EMERGENCY DEPARTMENT       Daniel Taylor MD  02/16/20 0616

## 2020-02-16 NOTE — ED AVS SNAPSHOT
[facility] Emergency Department  [address]  [facility] [address]  Phone:  [phone]  Fax:  [phone]                                    Ashutosh Moraes   MRN: [id number]    Department:  [facility] Emergency Department   Date of Visit:  2/16/2020           After Visit Summary Signature Page    I have received my discharge instructions, and my questions have been answered. I have discussed any challenges I see with this plan with the nurse or doctor.    ..........................................................................................................................................  Patient/Patient Representative Signature      ..........................................................................................................................................  Patient Representative Print Name and Relationship to Patient    ..................................................               ................................................  Date                                   Time    ..........................................................................................................................................  Reviewed by Signature/Title    ...................................................              ..............................................  Date                                               Time          22EPIC Rev 08/18

## 2020-02-17 ENCOUNTER — NURSE TRIAGE (OUTPATIENT)
Dept: NURSING | Facility: CLINIC | Age: 55
End: 2020-02-17

## 2020-02-17 NOTE — TELEPHONE ENCOUNTER
Ashutosh calls and says that he was in an ER yesterday and was diagnosed with kidney stones. Pt. Says that his discharge paper work says that his stones should pass within a day. Pt. Says that he thinks he passed one that was broken up. Pt. Says that he is still taking the pain medicine and will follow up with his Dr.  Reason for Disposition    [1] Follow-up call to recent contact AND [2] information only call, no triage required    Additional Information    Negative: [1] Caller is not with the adult (patient) AND [2] reporting urgent symptoms    Negative: Lab result questions    Negative: Medication questions    Negative: Caller can't be reached by phone    Negative: Caller has already spoken to PCP or another triager    Negative: RN needs further essential information from caller in order to complete triage    Negative: Requesting regular office appointment    Negative: [1] Caller requesting NON-URGENT health information AND [2] PCP's office is the best resource    Negative: Health Information question, no triage required and triager able to answer question    Negative: General information question, no triage required and triager able to answer question    Negative: Question about upcoming scheduled test, no triage required and triager able to answer question    Negative: [1] Caller is not with the adult (patient) AND [2] probable NON-URGENT symptoms    Protocols used: INFORMATION ONLY CALL-A-

## 2020-02-19 ENCOUNTER — HOSPITAL ENCOUNTER (EMERGENCY)
Facility: CLINIC | Age: 55
Discharge: HOME | End: 2020-02-19
Attending: EMERGENCY MEDICINE | Admitting: EMERGENCY MEDICINE
Payer: COMMERCIAL

## 2020-02-19 VITALS
SYSTOLIC BLOOD PRESSURE: 132 MMHG | RESPIRATION RATE: 16 BRPM | TEMPERATURE: 97.5 F | DIASTOLIC BLOOD PRESSURE: 88 MMHG | OXYGEN SATURATION: 96 % | HEART RATE: 65 BPM

## 2020-02-19 DIAGNOSIS — N23 RENAL COLIC: ICD-10-CM

## 2020-02-19 LAB
ALBUMIN UR-MCNC: 20 MG/DL
APPEARANCE UR: ABNORMAL
BILIRUB UR QL STRIP: NEGATIVE
COLOR UR AUTO: ABNORMAL
GLUCOSE UR STRIP-MCNC: NEGATIVE MG/DL
HGB UR QL STRIP: ABNORMAL
KETONES UR STRIP-MCNC: NEGATIVE MG/DL
LEUKOCYTE ESTERASE UR QL STRIP: ABNORMAL
NITRATE UR QL: NEGATIVE
PH UR STRIP: 6 PH (ref 5–7)
RBC #/AREA URNS AUTO: >182 /HPF (ref 0–2)
SOURCE: ABNORMAL
SP GR UR STRIP: 1.01 (ref 1–1.03)
UROBILINOGEN UR STRIP-MCNC: NORMAL MG/DL (ref 0–2)
WBC #/AREA URNS AUTO: 3 /HPF (ref 0–5)

## 2020-02-19 PROCEDURE — 88300 SURGICAL PATH GROSS: CPT | Performed by: EMERGENCY MEDICINE

## 2020-02-19 PROCEDURE — 25000132 ZZH RX MED GY IP 250 OP 250 PS 637: Performed by: EMERGENCY MEDICINE

## 2020-02-19 PROCEDURE — 81001 URINALYSIS AUTO W/SCOPE: CPT | Performed by: EMERGENCY MEDICINE

## 2020-02-19 PROCEDURE — 88300 SURGICAL PATH GROSS: CPT | Mod: 26 | Performed by: EMERGENCY MEDICINE

## 2020-02-19 PROCEDURE — 99283 EMERGENCY DEPT VISIT LOW MDM: CPT

## 2020-02-19 PROCEDURE — 82365 CALCULUS SPECTROSCOPY: CPT | Performed by: EMERGENCY MEDICINE

## 2020-02-19 RX ORDER — OXYCODONE HYDROCHLORIDE 5 MG/1
5 TABLET ORAL EVERY 6 HOURS PRN
Qty: 12 TABLET | Refills: 0 | Status: SHIPPED | OUTPATIENT
Start: 2020-02-19 | End: 2020-06-20

## 2020-02-19 RX ORDER — OXYCODONE HYDROCHLORIDE 5 MG/1
5 TABLET ORAL ONCE
Status: COMPLETED | OUTPATIENT
Start: 2020-02-19 | End: 2020-02-19

## 2020-02-19 RX ORDER — TAMSULOSIN HYDROCHLORIDE 0.4 MG/1
0.4 CAPSULE ORAL DAILY
Qty: 7 CAPSULE | Refills: 0 | Status: SHIPPED | OUTPATIENT
Start: 2020-02-19 | End: 2020-06-20

## 2020-02-19 RX ADMIN — OXYCODONE HYDROCHLORIDE 5 MG: 5 TABLET ORAL at 21:56

## 2020-02-19 NOTE — ED AVS SNAPSHOT
[facility] Emergency Department  [address]  [facility] [address]  Phone:  [phone]  Fax:  [phone]                                    Ashutosh Moraes   MRN: [id number]    Department:  [facility] Emergency Department   Date of Visit:  2/19/2020           After Visit Summary Signature Page    I have received my discharge instructions, and my questions have been answered. I have discussed any challenges I see with this plan with the nurse or doctor.    ..........................................................................................................................................  Patient/Patient Representative Signature      ..........................................................................................................................................  Patient Representative Print Name and Relationship to Patient    ..................................................               ................................................  Date                                   Time    ..........................................................................................................................................  Reviewed by Signature/Title    ...................................................              ..............................................  Date                                               Time          22EPIC Rev 08/18

## 2020-02-20 ENCOUNTER — NURSE TRIAGE (OUTPATIENT)
Dept: NURSING | Facility: CLINIC | Age: 55
End: 2020-02-20

## 2020-02-20 LAB — COPATH REPORT: NORMAL

## 2020-02-20 ASSESSMENT — ENCOUNTER SYMPTOMS
FEVER: 0
HEMATURIA: 1
FLANK PAIN: 1
NAUSEA: 1
ABDOMINAL PAIN: 0
CHILLS: 0
VOMITING: 0
DYSURIA: 1

## 2020-02-20 NOTE — ED TRIAGE NOTES
Pt in with C/O R flank pain. Pt reports seen 3 days ago and dx with kidney stones. Pt reports he passed one stone on the first day. Pt reports increase in pain and new onset hematuria today. Pt a&ox4 ABCD's intact.

## 2020-02-20 NOTE — ED PROVIDER NOTES
History     Chief Complaint:    Flank Pain      HPI   Ashutosh Moraes is a 54 year old male who presents with chief complaint right flank pain.  Patient was seen in our department 3 days ago and diagnosed with a kidney stone.  Patient believes he passed the stone the next day and brings kidney stone with him to emergency department.  In the following days, patient reports persistent similar pain, and he has also developed hematuria.  He denies fevers or chills.  He reports some nausea, but no vomiting.  He presented to ED today out of concern for persistent pain despite having passed the stone.  He has not yet followed up with anyone.  He reports he has used all of his pain medication.    Allergies:  Atorvastatin      Medications:    Aspirin   Lisinopril   Omeprazole  Zofran ODT   Oxycodone  Crestor     Past Medical History:    Hypertension  Hyperlipidemia  GERD   CAD   Kidney stones     Past Surgical History:    Appendectomy  Heart cath     Family History:    Asthma - Mother   CAD - Father     Social History:   Marital Status:   [2]  Social History     Tobacco Use     Smoking status: Never Smoker     Smokeless tobacco: Never Used   Substance Use Topics     Alcohol use: No     Alcohol/week: 0.0 standard drinks     Drug use: No      Review of Systems   Constitutional: Negative for chills and fever.   Gastrointestinal: Positive for nausea. Negative for abdominal pain and vomiting.   Genitourinary: Positive for dysuria, flank pain and hematuria.   All other systems reviewed and are negative.        Physical Exam   First Vitals:  BP: (!) 157/104  Pulse: 65  Temp: 97.5  F (36.4  C)  Resp: 16  SpO2: 97 %      Physical Exam  Nursing note and vitals reviewed.  General: Patient is alert and interactive when I enter the room  Head:  The scalp, face, and head appear normal  Eyes:  Conjunctivae are normal  ENT:    The nose is normal    Pinnae are normal  Neck:  Trachea midline  CV:  Normal rate  Resp:  No respiratory  distress   Musc:  Normal muscular tone    No CVA tenderness.   Skin:  No rash or lesions noted  Neuro: Speech is normal and fluent. Face is symmetric. Moving all extremities well.   Psych:  Awake. Alert.  Normal affect.  Appropriate interactions.      Emergency Department Course       Laboratory:  Labs Ordered and Resulted from Time of ED Arrival Up to the Time of Departure from the ED   ROUTINE UA WITH MICROSCOPIC REFLEX TO CULTURE - Abnormal; Notable for the following components:       Result Value    Blood Urine Large (*)     Protein Albumin Urine 20 (*)     Leukocyte Esterase Urine Trace (*)     RBC Urine >182 (*)     All other components within normal limits   STONE ANALYSIS     Interventions:  Medications   oxyCODONE (ROXICODONE) tablet 5 mg (5 mg Oral Given 2/19/20 2156)         Impression & Plan      Medical Decision Making:  Patient presents with persistent right-sided flank pain.  Since his ED visit, he has passed a stone but has gone on to develop hematuria.  He is not having any difficulty passing urine.  Given his persistent right flank pain he presented for reevaluation.  Patient reassured that even after passing a stone, he may still have some residual pain related to ureteral spasm.  We also reviewed his CT which did show multiple undescended stones on the right, so there is also possibility of new stone.  No signs of infection.  I do not believe there is any indication for repeat imaging.  Patient did collect a stone that we sent to the lab for analysis.  Patient understands he will need to follow-up with primary care to discuss these results further.  I started patient on Flomax to help manage his pain related to ureteral spasm, and also gave some additional oxycodone in the event of new stone and persistent pain.He is in stable condition at the time of discharge, indications for return to the ED were discussed as well as follow up. All questions were answered and he is in agreement with the  plan.      Diagnosis:    ICD-10-CM    1. Renal colic N23        Disposition:  discharged to home    Discharge Medications:  Discharge Medication List as of 2/19/2020 10:50 PM      START taking these medications    Details   tamsulosin 0.4 MG PO capsule Take 1 capsule (0.4 mg) by mouth daily for 7 days, Disp-7 capsule, R-0, Local Print             I, Aidan Ashby, am serving as a scribe at 9:36 PM on 2/19/2020 to document services personally performed by Dr. Fox, based on my observations and the provider's statements to me.    [facility] EMERGENCY DEPARTMENT       Ryder Fox MD  02/20/20 0446

## 2020-02-21 NOTE — TELEPHONE ENCOUNTER
Ashutosh has questions about taking Tylenol and Ibuprofen. Has flank pain caused by kidney stones, he may have passed one stone last night, and thinks he still has another one. FNA advised that max acetaminophen dose is 4000 mg, and may take ibuprofen for comfort. May take 400 mg every 6 hours, max dose in 24 hours is 3000 mg. Asked about drug to drug interaction between metformin and ibuprofen. Per Micromedex, no drug-drug interaction. Advised him to reach out to pharmacist.    FNA advised to have him follow up with urologist if pain persists. He verbalized understanding.    Cari Shaikh RN/[place] Nurse Advisor    Reason for Disposition    Caller has medication question about med not prescribed by PCP and triager unable to answer question (e.g., compatibility with other med, storage)    Protocols used: MEDICATION QUESTION CALL-A-AH

## 2020-02-23 LAB
APPEARANCE STONE: NORMAL
COMPN STONE: NORMAL
NUMBER STONE: 1
SIZE STONE: NORMAL MM
WT STONE: 3 MG

## 2020-06-20 ENCOUNTER — HOSPITAL ENCOUNTER (OUTPATIENT)
Facility: CLINIC | Age: 55
Setting detail: OBSERVATION
Discharge: HOME | End: 2020-06-21
Attending: EMERGENCY MEDICINE | Admitting: INTERNAL MEDICINE
Payer: COMMERCIAL

## 2020-06-20 ENCOUNTER — NURSE TRIAGE (OUTPATIENT)
Dept: NURSING | Facility: CLINIC | Age: 55
End: 2020-06-20

## 2020-06-20 ENCOUNTER — APPOINTMENT (OUTPATIENT)
Dept: CT IMAGING | Facility: CLINIC | Age: 55
End: 2020-06-20
Attending: EMERGENCY MEDICINE
Payer: COMMERCIAL

## 2020-06-20 DIAGNOSIS — R52 INTRACTABLE PAIN: ICD-10-CM

## 2020-06-20 DIAGNOSIS — N20.1 URETEROLITHIASIS: ICD-10-CM

## 2020-06-20 LAB
ALBUMIN UR-MCNC: 30 MG/DL
ANION GAP SERPL CALCULATED.3IONS-SCNC: 9 MMOL/L (ref 3–14)
APPEARANCE UR: CLEAR
BACTERIA #/AREA URNS HPF: ABNORMAL /HPF
BASOPHILS # BLD AUTO: 0.1 10E9/L (ref 0–0.2)
BASOPHILS NFR BLD AUTO: 0.7 %
BILIRUB UR QL STRIP: NEGATIVE
BUN SERPL-MCNC: 19 MG/DL (ref 7–30)
CALCIUM SERPL-MCNC: 9 MG/DL (ref 8.5–10.1)
CHLORIDE SERPL-SCNC: 101 MMOL/L (ref 94–109)
CO2 SERPL-SCNC: 27 MMOL/L (ref 20–32)
COLOR UR AUTO: YELLOW
CREAT SERPL-MCNC: 1.56 MG/DL (ref 0.66–1.25)
DIFFERENTIAL METHOD BLD: NORMAL
EOSINOPHIL # BLD AUTO: 0.2 10E9/L (ref 0–0.7)
EOSINOPHIL NFR BLD AUTO: 1.6 %
ERYTHROCYTE [DISTWIDTH] IN BLOOD BY AUTOMATED COUNT: 12.4 % (ref 10–15)
GFR SERPL CREATININE-BSD FRML MDRD: 50 ML/MIN/{1.73_M2}
GLUCOSE SERPL-MCNC: 193 MG/DL (ref 70–99)
GLUCOSE UR STRIP-MCNC: NEGATIVE MG/DL
HCT VFR BLD AUTO: 44.1 % (ref 40–53)
HGB BLD-MCNC: 14.4 G/DL (ref 13.3–17.7)
HGB UR QL STRIP: ABNORMAL
IMM GRANULOCYTES # BLD: 0.1 10E9/L (ref 0–0.4)
IMM GRANULOCYTES NFR BLD: 0.6 %
KETONES UR STRIP-MCNC: ABNORMAL MG/DL
LEUKOCYTE ESTERASE UR QL STRIP: NEGATIVE
LYMPHOCYTES # BLD AUTO: 2 10E9/L (ref 0.8–5.3)
LYMPHOCYTES NFR BLD AUTO: 18.4 %
MCH RBC QN AUTO: 28.9 PG (ref 26.5–33)
MCHC RBC AUTO-ENTMCNC: 32.7 G/DL (ref 31.5–36.5)
MCV RBC AUTO: 88 FL (ref 78–100)
MONOCYTES # BLD AUTO: 0.7 10E9/L (ref 0–1.3)
MONOCYTES NFR BLD AUTO: 6.4 %
MUCOUS THREADS #/AREA URNS LPF: PRESENT /LPF
NEUTROPHILS # BLD AUTO: 7.8 10E9/L (ref 1.6–8.3)
NEUTROPHILS NFR BLD AUTO: 72.3 %
NITRATE UR QL: NEGATIVE
NRBC # BLD AUTO: 0 10*3/UL
NRBC BLD AUTO-RTO: 0 /100
PH UR STRIP: 5 PH (ref 5–7)
PLATELET # BLD AUTO: 205 10E9/L (ref 150–450)
POTASSIUM SERPL-SCNC: 3.9 MMOL/L (ref 3.4–5.3)
RBC # BLD AUTO: 4.99 10E12/L (ref 4.4–5.9)
RBC #/AREA URNS AUTO: 24 /HPF (ref 0–2)
SARS-COV-2 PCR COMMENT: NORMAL
SARS-COV-2 RNA SPEC QL NAA+PROBE: NEGATIVE
SARS-COV-2 RNA SPEC QL NAA+PROBE: NORMAL
SODIUM SERPL-SCNC: 137 MMOL/L (ref 133–144)
SOURCE: ABNORMAL
SP GR UR STRIP: 1.02 (ref 1–1.03)
SPECIMEN SOURCE: NORMAL
SPECIMEN SOURCE: NORMAL
UROBILINOGEN UR STRIP-MCNC: NORMAL MG/DL (ref 0–2)
WBC # BLD AUTO: 10.8 10E9/L (ref 4–11)
WBC #/AREA URNS AUTO: 1 /HPF (ref 0–5)

## 2020-06-20 PROCEDURE — 96375 TX/PRO/DX INJ NEW DRUG ADDON: CPT

## 2020-06-20 PROCEDURE — 81001 URINALYSIS AUTO W/SCOPE: CPT | Performed by: EMERGENCY MEDICINE

## 2020-06-20 PROCEDURE — 25800030 ZZH RX IP 258 OP 636: Performed by: PHYSICIAN ASSISTANT

## 2020-06-20 PROCEDURE — 96376 TX/PRO/DX INJ SAME DRUG ADON: CPT

## 2020-06-20 PROCEDURE — 96361 HYDRATE IV INFUSION ADD-ON: CPT

## 2020-06-20 PROCEDURE — 99220 ZZC INITIAL OBSERVATION CARE,LEVL III: CPT | Performed by: PHYSICIAN ASSISTANT

## 2020-06-20 PROCEDURE — 25000132 ZZH RX MED GY IP 250 OP 250 PS 637: Performed by: PHYSICIAN ASSISTANT

## 2020-06-20 PROCEDURE — 80048 BASIC METABOLIC PNL TOTAL CA: CPT | Performed by: EMERGENCY MEDICINE

## 2020-06-20 PROCEDURE — 96374 THER/PROPH/DIAG INJ IV PUSH: CPT

## 2020-06-20 PROCEDURE — 99285 EMERGENCY DEPT VISIT HI MDM: CPT | Mod: 25

## 2020-06-20 PROCEDURE — 25000132 ZZH RX MED GY IP 250 OP 250 PS 637: Performed by: EMERGENCY MEDICINE

## 2020-06-20 PROCEDURE — C9803 HOPD COVID-19 SPEC COLLECT: HCPCS

## 2020-06-20 PROCEDURE — G0378 HOSPITAL OBSERVATION PER HR: HCPCS

## 2020-06-20 PROCEDURE — 74176 CT ABD & PELVIS W/O CONTRAST: CPT

## 2020-06-20 PROCEDURE — 85025 COMPLETE CBC W/AUTO DIFF WBC: CPT | Performed by: EMERGENCY MEDICINE

## 2020-06-20 PROCEDURE — 25000128 H RX IP 250 OP 636: Performed by: EMERGENCY MEDICINE

## 2020-06-20 RX ORDER — ROSUVASTATIN CALCIUM 40 MG/1
40 TABLET, COATED ORAL EVERY EVENING
COMMUNITY

## 2020-06-20 RX ORDER — AMOXICILLIN 250 MG
2 CAPSULE ORAL 2 TIMES DAILY PRN
Status: DISCONTINUED | OUTPATIENT
Start: 2020-06-20 | End: 2020-06-21 | Stop reason: HOSPADM

## 2020-06-20 RX ORDER — HYDROMORPHONE HYDROCHLORIDE 1 MG/ML
0.5 INJECTION, SOLUTION INTRAMUSCULAR; INTRAVENOUS; SUBCUTANEOUS
Status: DISCONTINUED | OUTPATIENT
Start: 2020-06-20 | End: 2020-06-20

## 2020-06-20 RX ORDER — TAMSULOSIN HYDROCHLORIDE 0.4 MG/1
0.4 CAPSULE ORAL DAILY
Status: DISCONTINUED | OUTPATIENT
Start: 2020-06-20 | End: 2020-06-21 | Stop reason: HOSPADM

## 2020-06-20 RX ORDER — ONDANSETRON 2 MG/ML
4 INJECTION INTRAMUSCULAR; INTRAVENOUS EVERY 30 MIN PRN
Status: DISCONTINUED | OUTPATIENT
Start: 2020-06-20 | End: 2020-06-20

## 2020-06-20 RX ORDER — METFORMIN HCL 500 MG
500 TABLET, EXTENDED RELEASE 24 HR ORAL
Status: ON HOLD | COMMUNITY
End: 2021-05-22

## 2020-06-20 RX ORDER — SODIUM CHLORIDE, SODIUM LACTATE, POTASSIUM CHLORIDE, CALCIUM CHLORIDE 600; 310; 30; 20 MG/100ML; MG/100ML; MG/100ML; MG/100ML
INJECTION, SOLUTION INTRAVENOUS CONTINUOUS
Status: DISCONTINUED | OUTPATIENT
Start: 2020-06-20 | End: 2020-06-21 | Stop reason: HOSPADM

## 2020-06-20 RX ORDER — OXYCODONE HYDROCHLORIDE 5 MG/1
5 TABLET ORAL EVERY 6 HOURS PRN
COMMUNITY
End: 2020-06-20

## 2020-06-20 RX ORDER — LISINOPRIL AND HYDROCHLOROTHIAZIDE 12.5; 2 MG/1; MG/1
1 TABLET ORAL DAILY
Status: ON HOLD | COMMUNITY
Start: 2020-06-18 | End: 2021-05-22

## 2020-06-20 RX ORDER — ONDANSETRON 2 MG/ML
4 INJECTION INTRAMUSCULAR; INTRAVENOUS EVERY 6 HOURS PRN
Status: DISCONTINUED | OUTPATIENT
Start: 2020-06-20 | End: 2020-06-21 | Stop reason: HOSPADM

## 2020-06-20 RX ORDER — LISINOPRIL AND HYDROCHLOROTHIAZIDE 12.5; 2 MG/1; MG/1
1 TABLET ORAL DAILY
Status: DISCONTINUED | OUTPATIENT
Start: 2020-06-20 | End: 2020-06-20

## 2020-06-20 RX ORDER — ACETAMINOPHEN 650 MG/1
650 SUPPOSITORY RECTAL EVERY 4 HOURS PRN
Status: DISCONTINUED | OUTPATIENT
Start: 2020-06-20 | End: 2020-06-21 | Stop reason: HOSPADM

## 2020-06-20 RX ORDER — AMOXICILLIN 250 MG
1 CAPSULE ORAL 2 TIMES DAILY PRN
Status: DISCONTINUED | OUTPATIENT
Start: 2020-06-20 | End: 2020-06-21 | Stop reason: HOSPADM

## 2020-06-20 RX ORDER — OXYCODONE HYDROCHLORIDE 5 MG/1
5-10 TABLET ORAL
Status: DISCONTINUED | OUTPATIENT
Start: 2020-06-20 | End: 2020-06-21 | Stop reason: HOSPADM

## 2020-06-20 RX ORDER — HYDROMORPHONE HYDROCHLORIDE 2 MG/1
2 TABLET ORAL ONCE
Status: COMPLETED | OUTPATIENT
Start: 2020-06-20 | End: 2020-06-20

## 2020-06-20 RX ORDER — LIDOCAINE 40 MG/G
CREAM TOPICAL
Status: DISCONTINUED | OUTPATIENT
Start: 2020-06-20 | End: 2020-06-21 | Stop reason: HOSPADM

## 2020-06-20 RX ORDER — HYDROMORPHONE HYDROCHLORIDE 1 MG/ML
.3-.5 INJECTION, SOLUTION INTRAMUSCULAR; INTRAVENOUS; SUBCUTANEOUS
Status: DISCONTINUED | OUTPATIENT
Start: 2020-06-20 | End: 2020-06-21 | Stop reason: HOSPADM

## 2020-06-20 RX ORDER — ONDANSETRON 4 MG/1
4 TABLET, ORALLY DISINTEGRATING ORAL EVERY 6 HOURS PRN
Status: DISCONTINUED | OUTPATIENT
Start: 2020-06-20 | End: 2020-06-21 | Stop reason: HOSPADM

## 2020-06-20 RX ORDER — ACETAMINOPHEN 500 MG
1000 TABLET ORAL ONCE
Status: COMPLETED | OUTPATIENT
Start: 2020-06-20 | End: 2020-06-20

## 2020-06-20 RX ORDER — POLYETHYLENE GLYCOL 3350 17 G/17G
17 POWDER, FOR SOLUTION ORAL DAILY PRN
Status: DISCONTINUED | OUTPATIENT
Start: 2020-06-20 | End: 2020-06-21 | Stop reason: HOSPADM

## 2020-06-20 RX ORDER — NALOXONE HYDROCHLORIDE 0.4 MG/ML
.1-.4 INJECTION, SOLUTION INTRAMUSCULAR; INTRAVENOUS; SUBCUTANEOUS
Status: DISCONTINUED | OUTPATIENT
Start: 2020-06-20 | End: 2020-06-21 | Stop reason: HOSPADM

## 2020-06-20 RX ORDER — ACETAMINOPHEN 325 MG/1
650 TABLET ORAL EVERY 4 HOURS PRN
Status: DISCONTINUED | OUTPATIENT
Start: 2020-06-20 | End: 2020-06-21 | Stop reason: HOSPADM

## 2020-06-20 RX ADMIN — HYDROMORPHONE HYDROCHLORIDE 0.5 MG: 1 INJECTION, SOLUTION INTRAMUSCULAR; INTRAVENOUS; SUBCUTANEOUS at 10:08

## 2020-06-20 RX ADMIN — TAMSULOSIN HYDROCHLORIDE 0.4 MG: 0.4 CAPSULE ORAL at 16:53

## 2020-06-20 RX ADMIN — OMEPRAZOLE 20 MG: 20 CAPSULE, DELAYED RELEASE ORAL at 16:53

## 2020-06-20 RX ADMIN — HYDROMORPHONE HYDROCHLORIDE 2 MG: 2 TABLET ORAL at 13:33

## 2020-06-20 RX ADMIN — ONDANSETRON 4 MG: 2 INJECTION INTRAMUSCULAR; INTRAVENOUS at 08:33

## 2020-06-20 RX ADMIN — ONDANSETRON 4 MG: 2 INJECTION INTRAMUSCULAR; INTRAVENOUS at 14:25

## 2020-06-20 RX ADMIN — LISINOPRIL AND HYDROCHLOROTHIAZIDE 1 TABLET: 12.5; 2 TABLET ORAL at 14:44

## 2020-06-20 RX ADMIN — SODIUM CHLORIDE, POTASSIUM CHLORIDE, SODIUM LACTATE AND CALCIUM CHLORIDE: 600; 310; 30; 20 INJECTION, SOLUTION INTRAVENOUS at 16:54

## 2020-06-20 RX ADMIN — HYDROMORPHONE HYDROCHLORIDE 0.5 MG: 1 INJECTION, SOLUTION INTRAMUSCULAR; INTRAVENOUS; SUBCUTANEOUS at 11:24

## 2020-06-20 RX ADMIN — HYDROMORPHONE HYDROCHLORIDE 0.5 MG: 1 INJECTION, SOLUTION INTRAMUSCULAR; INTRAVENOUS; SUBCUTANEOUS at 08:33

## 2020-06-20 RX ADMIN — ACETAMINOPHEN 1000 MG: 500 TABLET, FILM COATED ORAL at 13:33

## 2020-06-20 ASSESSMENT — MIFFLIN-ST. JEOR: SCORE: 1929.01

## 2020-06-20 ASSESSMENT — ENCOUNTER SYMPTOMS
VOMITING: 1
FLANK PAIN: 1
NAUSEA: 1

## 2020-06-20 NOTE — ED NOTES
[facility]  ED Nurse Handoff Report    Ashutosh Moraes is a 54 year old male   ED Chief complaint: Flank Pain  . ED Diagnosis:   Final diagnoses:   Ureterolithiasis   Intractable pain     Allergies:   Allergies   Allergen Reactions     Atorvastatin Rash       Code Status: Full Code  Activity level - Baseline/Home:  Independent. Activity Level - Current:   Independent. Lift room needed: No. Bariatric: No   Needed: No   Isolation: No. Infection: Not Applicable.     Vital Signs:   Vitals:    06/20/20 0656 06/20/20 0845 06/20/20 1334   BP: (!) 150/100  (!) 175/110   Pulse: 82  85   Resp: 18  16   Temp: 97.9  F (36.6  C)     SpO2: 99% 92% 100%       Cardiac Rhythm:  ,      Pain level:    Patient confused: No. Patient Falls Risk: Yes.   Elimination Status: Has voided   Patient Report - Initial Complaint:   ED Triage Notes Filed Flank pain since 0400.  Hx/o of kidney stones, feels like typical kidney stone.  Vomited once at 0600.     . Focused Assessment:    Musculoskeletal Musculoskeletal - Musculoskeletal WDL: -WDL except  Pain Body Location: flank (left flank pain)       Tests Performed: labs, imaging. Abnormal Results:   Labs Ordered and Resulted from Time of ED Arrival Up to the Time of Departure from the ED   ROUTINE UA WITH MICROSCOPIC - Abnormal; Notable for the following components:       Result Value    Ketones Urine Trace (*)     Blood Urine Moderate (*)     Protein Albumin Urine 30 (*)     RBC Urine 24 (*)     Bacteria Urine Few (*)     Mucous Urine Present (*)     All other components within normal limits   BASIC METABOLIC PANEL - Abnormal; Notable for the following components:    Glucose 193 (*)     Creatinine 1.56 (*)     GFR Estimate 50 (*)     GFR Estimate If Black 58 (*)     All other components within normal limits   CBC WITH PLATELETS DIFFERENTIAL   COVID-19 VIRUS (CORONAVIRUS) BY PCR   PERIPHERAL IV CATHETER     CT Abdomen Pelvis w/o Contrast   Preliminary Result   IMPRESSION:    1. 3 mm stone at the left ureterovesical junction with associated mild   hydroureter/hydronephrosis. Few other bilateral kidney stones measure   up to 5 mm at the upper pole of the right kidney. No right ureteric   stone or hydronephrosis.   2. 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   3. Scattered colonic diverticulosis without CT evidence of acute   diverticulitis.           Treatments provided: see MAR  Family Comments: none at bedside  OBS brochure/video discussed/provided to patient:  Yes  ED Medications:   Medications   ondansetron (ZOFRAN) injection 4 mg (4 mg Intravenous Given 6/20/20 1425)   HYDROmorphone (PF) (DILAUDID) injection 0.5 mg (0.5 mg Intravenous Given 6/20/20 1008)   HYDROmorphone (PF) (DILAUDID) injection 0.5 mg (0.5 mg Intravenous Given 6/20/20 1124)   lisinopril-hydrochlorothiazide (ZESTORETIC) 20-12.5 MG per tablet 1 tablet (1 tablet Oral Given 6/20/20 1444)   HYDROmorphone (DILAUDID) tablet 2 mg (2 mg Oral Given 6/20/20 1333)   acetaminophen (TYLENOL) tablet 1,000 mg (1,000 mg Oral Given 6/20/20 1333)     Drips infusing:  No  For the majority of the shift, the patient's behavior Green. Interventions performed were N/A.    Sepsis treatment initiated: No       ED Nurse Name/Phone Number: Soniya Majano RN,   2:56 PM    RECEIVING UNIT ED HANDOFF REVIEW    Above ED Nurse Handoff Report was reviewed: Yes  Reviewed by: Rachael Rob RN on June 20, 2020 at 3:10 PM

## 2020-06-20 NOTE — H&P
History and Physical     Ashutosh Moraes MRN# [id number]   YOB: 1965 Age: 54 year old      Date of Admission:  6/20/2020    Primary care provider: Kathy Chandra          Assessment and Plan:   Ashutosh Moraes is a 54 year old male with a PMH significant for previous kidney stones, coronary artery disease with history of stent placement, hypertension, hyperlipidemia and type 2 diabetes who presents with intractable left flank pain with associated nausea and vomiting.    Patient was discussed with Dr. Baldwin, who was provider in ED. Chart review of ED work up was reviewed as well as chart review of Care Everywhere, previous visits and admissions.     #Left nephrolithiasis  Developed intractable left flank pain associated with nausea and vomiting at 4 AM today.  Pain is slightly improved now.  He has been afebrile and UA does not appear infected.  CT of the abdomen shows a 3 mm stone in the UVJ with mild hydronephrosis.  Creatinine is 1.56 with most recent creatinine recorded in February 2020 at 1.65.  I suspect the stone will pass on its own with conservative measures.  -Flomax 0.4 mg daily  -LR at 125 mL/h  -We will have Tylenol, oxycodone and Dilaudid available for pain  -Antinausea meds ordered  -We will allow him to eat tonight but placed n.p.o. after midnight in case urologic procedure is needed  -Consider urology consult in a.m. if he does not pass stone  -Strain urine  -Repeat BMP in a.m.  -Asymptomatic COVID test ordered    #Hypertension  Patient did receive his Zestoric in the ER.  Suspect that his blood pressure is elevated due to pain.  -Blood pressure medicine will need to be reordered if he stays tomorrow    #Type 2 diabetes  Blood sugar in the ER was 193 with no recent A1c.    -Hold metformin  -Twice daily/at bedtime glucose checks    #HLP  -Hold Crestor for now    #History of coronary disease  No complaints of chest discomfort.  Plan to hold cholesterol medicine and is not on  beta-blocker.  -Continue baby aspirin    #GERD  -Continue omeprazole          Social: No concerns  Code: Discussed with patient and they have chosen full code  VTE prophylaxis: Ambulation  Disposition: Observation                    Chief Complaint:   Left flank pain         History of Present Illness:   Ashutosh Moraes is a 54 year old male who presents with left flank pain and intractable nausea and vomiting.  Patient states his pain awoke him from sleep at 4 AM today and has been consistent since.  The pain has been associated with nausea and vomiting but no fever, chills or diarrhea.  He has had some mild dysuria but no hematuria.  He has not been ill recently and denies shortness of breath, cough and chest discomfort.  He has had kidney stones in the past that have not been as severe as this one.  He does not smoke cigarettes, drink alcohol or had recent changes to his medicines             Past Medical History:     Past Medical History:   Diagnosis Date     CAD (coronary artery disease)     Cardiac cath 7/2009: RAMANA to [facility]     Gastro-oesophageal reflux disease      HLD (hyperlipidemia)      Hypertension      Kidney stones                Past Surgical History:     Past Surgical History:   Procedure Laterality Date     APPENDECTOMY NOS       HEART CATH LEFT HEART CATH  07/02/2009    RAMANA to                Social History:     Social History     Socioeconomic History     Marital status:      Spouse name: Not on file     Number of children: Not on file     Years of education: Not on file     Highest education level: Not on file   Occupational History     Not on file   Social Needs     Financial resource strain: Not on file     Food insecurity     Worry: Not on file     Inability: Not on file     Transportation needs     Medical: Not on file     Non-medical: Not on file   Tobacco Use     Smoking status: Never Smoker     Smokeless tobacco: Never Used   Substance and Sexual Activity     Alcohol use: No      Alcohol/week: 0.0 standard drinks     Drug use: No     Sexual activity: Not on file   Lifestyle     Physical activity     Days per week: Not on file     Minutes per session: Not on file     Stress: Not on file   Relationships     Social connections     Talks on phone: Not on file     Gets together: Not on file     Attends Hinduism service: Not on file     Active member of club or organization: Not on file     Attends meetings of clubs or organizations: Not on file     Relationship status: Not on file     Intimate partner violence     Fear of current or ex partner: Not on file     Emotionally abused: Not on file     Physically abused: Not on file     Forced sexual activity: Not on file   Other Topics Concern     Parent/sibling w/ CABG, MI or angioplasty before 65F 55M? Not Asked      Service Not Asked     Blood Transfusions Not Asked     Caffeine Concern No     Comment: 1 a day     Occupational Exposure Not Asked     Hobby Hazards Not Asked     Sleep Concern Not Asked     Stress Concern Not Asked     Weight Concern Not Asked     Special Diet Not Asked     Back Care Not Asked     Exercise No     Comment: occasional. recently joined [employer]     Bike Helmet Not Asked     Seat Belt Not Asked     Self-Exams Not Asked   Social History Narrative     Not on file               Family History:     Family History   Problem Relation Age of Onset     Asthma Mother      Coronary Artery Disease Father               Allergies:      Allergies   Allergen Reactions     Atorvastatin Rash               Medications:     Prior to Admission medications    Medication Sig Last Dose Taking? Auth Provider   aspirin 81 MG EC tablet Take 81 mg by mouth daily  6/19/2020 at Unknown time Yes Reported, Patient   lisinopril-hydrochlorothiazide (ZESTORETIC) 20-12.5 MG tablet Take 1 tablet by mouth daily  6/19/2020 at Unknown time Yes Reported, Patient   metFORMIN (GLUCOPHAGE-XR) 500 MG 24 hr tablet Take 500 mg by mouth daily (with dinner)  6/19/2020 at Unknown time Yes Unknown, Entered By History   omeprazole (PRILOSEC) 20 MG DR capsule Take 20 mg by mouth daily 6/19/2020 at Unknown time Yes Unknown, Entered By History   rosuvastatin (CRESTOR) 40 MG tablet Take 40 mg by mouth daily 6/19/2020 at Unknown time Yes Unknown, Entered By History              Review of Systems:   A Comprehensive greater than 10 system review of systems was carried out.  Pertinent positives and negatives are noted above.  Otherwise negative for contributory information.            Physical Exam:   Blood pressure (!) 175/110, pulse 85, temperature 97.9  F (36.6  C), resp. rate 16, SpO2 100 %.  Exam:  GENERAL:  Comfortable.  PSYCH: pleasant, oriented, No acute distress.  HEENT:  PERRLA. Normal conjunctiva, normal hearing, nasal mucosa and Oropharynx are normal.  NECK:  Supple, no neck vein distention, adenopathy or bruits, normal thyroid.  HEART:  Normal S1, S2 with no murmur, no pericardial rub, gallops or S3 or S4.  LUNGS:  Clear to auscultation, normal Respiratory effort. No wheezing, rales or ronchi.  ABDOMEN:  Soft, no hepatosplenomegaly, normal bowel sounds. Non-tender, non distended.   EXTREMITIES:  No pedal edema, +2 pulses bilateral and equal.  SKIN:  Dry to touch, No rash, wound or ulcerations.  NEUROLOGIC:  CN 2-12 grossly intact, sensation is intact with no focal deficits.               Data:     Recent Labs   Lab 06/20/20  0748   WBC 10.8   HGB 14.4   HCT 44.1   MCV 88        Recent Labs   Lab 06/20/20  0748      POTASSIUM 3.9   CHLORIDE 101   CO2 27   ANIONGAP 9   *   BUN 19   CR 1.56*   GFRESTIMATED 50*   GFRESTBLACK 58*   KATIANA 9.0     Recent Labs   Lab 06/20/20  0709   COLOR Yellow   APPEARANCE Clear   URINEGLC Negative   URINEBILI Negative   URINEKETONE Trace*   SG 1.024   UBLD Moderate*   URINEPH 5.0   PROTEIN 30*   NITRITE Negative   LEUKEST Negative   RBCU 24*   WBCU 1         Recent Results (from the past 24 hour(s))   CT Abdomen Pelvis  w/o Contrast    Narrative    CT ABDOMEN AND PELVIS WITHOUT CONTRAST   6/20/2020 12:48 PM     HISTORY: Left flank pain, stone disease suspected.    TECHNIQUE: Noncontrast CT abdomen and pelvis was performed. Radiation  dose for this scan was reduced using automated exposure control,  adjustment of the mA and/or kV according to patient size, or iterative  reconstruction technique.    COMPARISON: CT abdomen and pelvis on 2/16/2020.    FINDINGS:   Lung bases: Mild bibasilar atelectasis.    Abdomen/pelvis:  Right kidney: Multiple kidney stones, largest measures 5 mm at the  upper pole (series 3 image 81). No ureteric stone or hydronephrosis.    Left kidney: Few kidney stones including 3 mm stone at the interpolar  region (series 3 image 98). 3 mm stone at the left ureterovesical  junction with associated mild hydroureter/hydronephrosis (series 3  image 220). Associated perinephric fat stranding is also noted.    Urinary bladder: Partially distended which precludes detailed  evaluation. 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    Remainder of the abdomen and pelvis: Limited evaluation of the  abdominal organs due to lack of IV contrast. The unenhanced liver,  gallbladder, spleen, adrenal glands and pancreas are grossly  unremarkable.    No abnormally dilated bowel loops. Scattered colonic diverticulosis  without CT evidence of acute diverticulitis. The appendix is  surgically absent. Moderate predominantly aortobiiliac atherosclerotic  vascular calcification. Soft tissue density at the lower portion of  the right inguinal canal, likely represents retracted testicle.    Bones and soft tissues: Mild degenerative changes of the spine.      Impression    IMPRESSION:  1. 3 mm stone at the left ureterovesical junction with associated mild  hydroureter/hydronephrosis. Few other bilateral kidney  stones measure  up to 5 mm at the upper pole of the right kidney. No right ureteric  stone or hydronephrosis.  2. 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  3. Scattered colonic diverticulosis without CT evidence of acute  diverticulitis.         Baylee Anne PA-C

## 2020-06-20 NOTE — PHARMACY-ADMISSION MEDICATION HISTORY
Admission medication history interview status for this patient is complete. See [facility] admission navigator for allergy information, prior to admission medications and immunization status.     Medication history interview done via telephone during Covid-19 pandemic, indicate source(s): Patient  Medication history resources (including written lists, pill bottles, clinic record): care everywhere    Changes made to PTA medication list:  Added: metformin  Deleted: zofran, oxycodone, tamsulosin  Changed: added instructions for zestoretic and strength of omeprazole    Actions taken by pharmacist (provider contacted, etc):None     Additional medication history information: Pt reported taking no meds today, but all meds yesterday. Pt took an oxycodone 5 mg this morning at 0600 that was leftover from his last kidney stone. He reported taking metformin, and he has tried to increase the strength/daily dose but experiences side effects like diarrhea - at this time it is taken just once daily.    Medication reconciliation/reorder completed by provider prior to medication history?  N   (Y/N)     For patients on insulin therapy: N  (Y/N)      Prior to Admission medications    Medication Sig Last Dose Taking? Auth Provider   aspirin 81 MG EC tablet Take 81 mg by mouth daily  6/19/2020 at Unknown time Yes Reported, Patient   lisinopril-hydrochlorothiazide (ZESTORETIC) 20-12.5 MG tablet Take 1 tablet by mouth daily  6/19/2020 at Unknown time Yes Reported, Patient   metFORMIN (GLUCOPHAGE-XR) 500 MG 24 hr tablet Take 500 mg by mouth daily (with dinner) 6/19/2020 at Unknown time Yes Unknown, Entered By History   omeprazole (PRILOSEC) 20 MG DR capsule Take 20 mg by mouth daily 6/19/2020 at Unknown time Yes Unknown, Entered By History   rosuvastatin (CRESTOR) 40 MG tablet Take 40 mg by mouth daily 6/19/2020 at Unknown time Yes Unknown, Entered By History

## 2020-06-20 NOTE — ED TRIAGE NOTES
Flank pain since 0400.  Hx/o of kidney stones, feels like typical kidney stone.  Vomited once at 0600.

## 2020-06-20 NOTE — TELEPHONE ENCOUNTER
"\"I have a history of kidney stones it feels like I have one again.\" Left flank pain waking from sleep at 4 a.m. today. Patient has taken 2 ES Tylenol along with 1 Oxycodone tablet he had from previous kidney stone episode. Vomiting x 1 episode in past 30 minutes. Rating pain \"5-7\" on 1-10 pain scale.     Per guidelines advised Emergency Room. Caller verbalized understanding. Denies further questions.  Patient plans to have someone transport him to [facility] ER now.    Rosalee Nj RN  [facility] [place] [facility]    COVID 19 Nurse Triage Plan/Patient Instructions    Please be aware that novel coronavirus (COVID-19) may be circulating in the community. If you develop symptoms such as fever, cough, or SOB or if you have concerns about the presence of another infection including coronavirus (COVID-19), please contact your health care provider or visit [email or web address].     Disposition/Instructions    Patient to go to ED and follow protocol based instructions.     Bring Your Own Device:  Please also bring your smart device(s) (smart phones, tablets, laptops) and their charging cables for your personal use and to communicate with your care team during your visit.    Thank you for taking steps to prevent the spread of this virus.  o Limit your contact with others.  o Wear a simple mask to cover your cough.  o Wash your hands well and often.    Resources    [facility]: About COVID-19: [email or web address]    CDC: What to Do If You're Sick: www.cdc.gov/coronavirus/2019-ncov/about/steps-when-sick.html    CDC: Ending Home Isolation: www.cdc.gov/coronavirus/2019-ncov/hcp/disposition-in-home-patients.html     CDC: Caring for Someone: www.cdc.gov/coronavirus/2019-ncov/if-you-are-sick/care-for-someone.html     [facility]: Interim Guidance for Hospital Discharge to Home: www.health.[facility].[place]/diseases/coronavirus/hcp/hospdischarge.pdf    [facility] clinical trials (COVID-19 research studies): " clinicalaffairs.[facility]-clinical-trials     Below are the COVID-19 hotlines at the [place] Department of [facility] ([facility]). Interpreters are available.   o For health questions: Call 663-226-2676 or 1-704.177.6264 (7 a.m. to 7 p.m.)  o For questions about schools and childcare: Call 575-380-3524 or 1-904.585.4745 (7 a.m. to 7 p.m.)       Reason for Disposition    Vomiting    Additional Information    Negative: Passed out (i.e., lost consciousness, collapsed and was not responding)    Negative: Shock suspected (e.g., cold/pale/clammy skin, too weak to stand, low BP, rapid pulse)    Negative: Difficult to awaken or acting confused (e.g., disoriented, slurred speech)    Negative: Sounds like a life-threatening emergency to the triager    Negative: Followed a major injury to the back (e.g., MVA, fall > 10 feet or 3 meters, penetrating injury, etc.)    Negative: Back pain or flank pain during pregnancy    Negative: Upper, mid or lower back pain that occurs mainly in the midline    Negative: [1] SEVERE pain (e.g., excruciating, scale 8-10) AND [2] present > 1 hour    Negative: [1] SEVERE pain (e.g., excruciating, scale 8-10) AND [2] not improved after pain medicine    Negative: [1] Sudden onset of severe flank pain AND [2] age > 60    Negative: [1] Abdominal pain AND [2] age > 60    Negative: [1] Unable to urinate (or only a few drops) > 4 hours AND     [2] bladder feels very full (e.g., palpable bladder or strong urge to urinate)    Protocols used: FLANK PAIN-A-AH

## 2020-06-20 NOTE — ED PROVIDER NOTES
History     Chief Complaint:  Flank Pain    HPI   Ashutosh Moraes is a 54 year old male with a history of kidney stones, hyperlipidemia, CAD, and hypertension who presents with left sided flank pain which began around 0400. This is accompanied with nausea and vomiting which began around 0600. Patient had his last kidney stone in February (02/19/2020) and has always been able to pass his kidney stones. He had a CT done 02/16 which showed non obstructing stones on the left side. The patient took 5 mg oxycodone and 1000 mg Tylenol prior to arrival which had no relief of his pain.     CT Abd/pelvis with contrast 02/16/2020:  1.  Mild right hydronephrosis.  2.  There is a 3 x 3 x 3 mm mildly obstructing proximal right ureteral calculus L3 level.  3.  Bilateral renal lithiasis right greater than left.  4.  Previous appendectomy.  5.  Atherosclerosis.  Imaging independently reviewed and agree with radiologist interpretation.     Allergies:  Atorvastatin     Medications:    Aspirin  Lisinopril  Prilosec  Rosuvastatin    Past Medical History:    CAD  Hyperlipidemia  Hypertension  Kidney stones  Gastro-esophageal reflux    Past Surgical History:    Appendectomy  Heart Cath Left    Family History:    Asthma  CAD    Social History:  Smoking status: Never  Alcohol use: No  Drug use: No  PCP: Kathy Chandra  Marital Status:   [2]     Review of Systems   Gastrointestinal: Positive for nausea and vomiting.   Genitourinary: Positive for flank pain.   All other systems reviewed and are negative.    Physical Exam     Patient Vitals for the past 24 hrs:   BP Temp Pulse Heart Rate Resp SpO2   06/20/20 1334 (!) 175/110 -- 85 -- 16 100 %   06/20/20 0845 -- -- -- -- -- 92 %   06/20/20 0656 (!) 150/100 97.9  F (36.6  C) 82 82 18 99 %       Physical Exam  General: Patient is alert and cooperative.  Uncomfortable.   HENT:  Normal appearance.   Eyes: EOMI. Normal conjunctiva.  Neck:  Normal range of motion and appearance.   Cardiovascular:   Normal rate.   Pulmonary/Chest:  Effort normal.   Abdominal: Soft. No distension or tenderness.     Musculoskeletal: Normal range of motion. No edema or tenderness.   Neurological: oriented, normal strength, sensation, and coordination.   Skin: Warm and dry. No rash or bruising.   Psychiatric: Normal mood and affect. Normal behavior and judgement.      Emergency Department Course   Imaging:  Radiology findings were communicated with the patient who voiced understanding of the findings.    CT Abdomen/Pelvis w/o IV contrast: per radiology.   1. 3 mm stone at the left ureterovesical junction with associated mild   hydroureter/hydronephrosis. Few other bilateral kidney stones measure   up to 5 mm at the upper pole of the right kidney. No right ureteric   stone or hydronephrosis.   2. 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   3. Scattered colonic diverticulosis without CT evidence of acute   diverticulitis.     Laboratory:  Laboratory findings were communicated with the patient who voiced understanding of the findings.    CBC: AWNL. (WBC 10.8, HGB 14.4, )     BMP: Glucose 193 (H), Creatinine 1.56 (H), GFR 50 (L) o/w WNL    UA: Urineketon Trace, Blood Moderate, Protein Albumine 30, RBC/HPF 24 (H), Bacteria Few, Mucous Present, o/w Negative     Interventions:  0833 Zofran 4 mg IV  0833 Hydromorphone 0.5 mg IV  1008 Hydromorphone 0.5 mg IV  1124 Hydromorphone 0.5 mg IV  1333 Hydromorphone 2mg PO  1333 Tylenol 1000 mg PO  1425 Zofran 4 mg IV  Zestoretic 1 tablet PO    Emergency Department Course:  Past medical records, nursing notes, and vitals reviewed.    0830 I performed an exam of the patient as documented above.     IV was inserted and blood was drawn for laboratory testing, results above.  The patient provided a urine sample here in the emergency department. This was  sent for laboratory testing, findings above.  The patient was sent for a Abdominal/Pelvic CT while in the emergency department, results above.     1027 I rechecked the patient and discussed the results of his workup thus far.   1322 I discussed results of CT with patient and he requested trial of oral pain medication for better pain management.   1410 I discussed disposition with the patient. He still feels uncomfortable and would like to stay in the hospital for pain management.     1426 I spoke with Baylee Anne PA-C of the hospitalist service regarding patient's presentation, findings, and plan of care. She agrees to admit the patient on behalf of Dr. Marie.     Findings and plan explained to the Patient who consents to admission. Discussed the patient with aBylee RICO on behalf of Dr. Marie, who will admit the patient to a observation bed for further monitoring, evaluation, and treatment.    I personally reviewed the laboratory  and imaging results with the Patient and answered all related questions prior to admission.     Impression & Plan   Medical Decision Making:  Ashutosh Moraes is a 54 year old male who presented with unilateral flank & abdominal pain consistent with renal colic. CT confirms a 3 mm ureteral stone at the uvj.  Baseline mild renal insufficiency.  CT and lab workup show no other alternative etiology that could be causing his symptoms (e.g., AAA, appendicitis, pyelonephritis). There is no fever or convincing evidence of a urinary tract infection. Symptoms have been intractable despite repeated IV narcotics.  He will require obs admit for further management of this small distal stone, which should pass without need for urologic intervention hopefully.       Diagnosis:    ICD-10-CM    1. Ureterolithiasis  N20.1        Disposition:  Admitted to observation.    Scribe Disclosure:  Eliel MARIN, am serving as a scribe at 8:30 AM on 6/20/2020 to document services personally performed  by Trevor Cuevas MD based on my observations and the provider's statements to me.     I, Rosalee Jorge, am serving as a scribe on 6/20/2020 at 9:07 AM to personally document services performed by Trevor Cuevas MD based on my observations and the provider's statements to me.         Trevor Cuevas MD  06/20/20 1439

## 2020-06-21 VITALS
HEIGHT: 68 IN | HEART RATE: 77 BPM | WEIGHT: 243.5 LBS | TEMPERATURE: 97.1 F | RESPIRATION RATE: 20 BRPM | BODY MASS INDEX: 36.9 KG/M2 | DIASTOLIC BLOOD PRESSURE: 86 MMHG | OXYGEN SATURATION: 94 % | SYSTOLIC BLOOD PRESSURE: 127 MMHG

## 2020-06-21 LAB
ANION GAP SERPL CALCULATED.3IONS-SCNC: 8 MMOL/L (ref 3–14)
BUN SERPL-MCNC: 22 MG/DL (ref 7–30)
CALCIUM SERPL-MCNC: 8.3 MG/DL (ref 8.5–10.1)
CHLORIDE SERPL-SCNC: 102 MMOL/L (ref 94–109)
CO2 SERPL-SCNC: 28 MMOL/L (ref 20–32)
CREAT SERPL-MCNC: 1.65 MG/DL (ref 0.66–1.25)
ERYTHROCYTE [DISTWIDTH] IN BLOOD BY AUTOMATED COUNT: 12.5 % (ref 10–15)
GFR SERPL CREATININE-BSD FRML MDRD: 47 ML/MIN/{1.73_M2}
GLUCOSE SERPL-MCNC: 135 MG/DL (ref 70–99)
HBA1C MFR BLD: 7.3 % (ref 0–5.6)
HCT VFR BLD AUTO: 38.5 % (ref 40–53)
HGB BLD-MCNC: 12.6 G/DL (ref 13.3–17.7)
MCH RBC QN AUTO: 29 PG (ref 26.5–33)
MCHC RBC AUTO-ENTMCNC: 32.7 G/DL (ref 31.5–36.5)
MCV RBC AUTO: 89 FL (ref 78–100)
PLATELET # BLD AUTO: 172 10E9/L (ref 150–450)
POTASSIUM SERPL-SCNC: 3.6 MMOL/L (ref 3.4–5.3)
RBC # BLD AUTO: 4.34 10E12/L (ref 4.4–5.9)
SODIUM SERPL-SCNC: 138 MMOL/L (ref 133–144)
WBC # BLD AUTO: 9.5 10E9/L (ref 4–11)

## 2020-06-21 PROCEDURE — 36415 COLL VENOUS BLD VENIPUNCTURE: CPT | Performed by: PHYSICIAN ASSISTANT

## 2020-06-21 PROCEDURE — G0378 HOSPITAL OBSERVATION PER HR: HCPCS

## 2020-06-21 PROCEDURE — 80048 BASIC METABOLIC PNL TOTAL CA: CPT | Performed by: PHYSICIAN ASSISTANT

## 2020-06-21 PROCEDURE — 25800030 ZZH RX IP 258 OP 636: Performed by: PHYSICIAN ASSISTANT

## 2020-06-21 PROCEDURE — 96361 HYDRATE IV INFUSION ADD-ON: CPT

## 2020-06-21 PROCEDURE — 85027 COMPLETE CBC AUTOMATED: CPT | Performed by: PHYSICIAN ASSISTANT

## 2020-06-21 PROCEDURE — 83036 HEMOGLOBIN GLYCOSYLATED A1C: CPT | Performed by: PHYSICIAN ASSISTANT

## 2020-06-21 PROCEDURE — 99217 ZZC OBSERVATION CARE DISCHARGE: CPT | Performed by: PHYSICIAN ASSISTANT

## 2020-06-21 RX ADMIN — SODIUM CHLORIDE, POTASSIUM CHLORIDE, SODIUM LACTATE AND CALCIUM CHLORIDE: 600; 310; 30; 20 INJECTION, SOLUTION INTRAVENOUS at 00:47

## 2020-06-21 NOTE — PLAN OF CARE
"PRIMARY DIAGNOSIS: ACUTE RENAL COLIC    OUTPATIENT/OBSERVATION GOALS TO BE MET BEFORE DISCHARGE  1. Pain Status: Pain free.    2. Tolerating adequate PO diet: NPO except for ice chips, denies nausea    3. Surgical Intervention planned: No    4. Cleared by consultants (if involved): N/A    5. Return to near baseline physical activity: Yes    Discharge Planner Nurse   Safe discharge environment identified: Yes  Barriers to discharge: To be cleared for discharge       Entered by: Morenita Junior 06/21/2020 12:33 AM     Please review provider order for any additional goals.   Nurse to notify provider when observation goals have been met and patient is ready for discharge.    Patient denies any nausea or pain. Urine strained, another 3mm stone found, left in strainer, will continue to monitor output.     /58 (BP Location: Left arm)   Pulse 100   Temp 97.2  F (36.2  C) (Oral)   Resp 18   Ht 1.727 m (5' 8\")   Wt 110.5 kg (243 lb 8 oz)   SpO2 90%   BMI 37.02 kg/m     "

## 2020-06-21 NOTE — PLAN OF CARE
ROOM # 203    Living Situation (if not independent, order SW consult): Ind with wife and kids  Facility name:  : [facility] (wife)    Activity level at baseline: IND  Activity level on admit: IND      Patient registered to observation; given Patient Bill of Rights; given the opportunity to ask questions about observation status and their plan of care.  Patient has been oriented to the observation room, bathroom and call light is in place.    Discussed discharge goals and expectations with patient/family.

## 2020-06-21 NOTE — PLAN OF CARE
Patient's After Visit Summary was reviewed with patient.   Patient verbalized understanding of After Visit Summary, recommended follow up and was given an opportunity to ask questions.   Discharge medications sent home with patient/family: No   Discharged with spouse    OBSERVATION patient END time: 0900    A&Ox4, independent, tolerating regular diet, denies pain, straining urine.

## 2020-06-21 NOTE — PLAN OF CARE
PRIMARY DIAGNOSIS: ACUTE RENAL COLIC    OUTPATIENT/OBSERVATION GOALS TO BE MET BEFORE DISCHARGE  1. Pain Status: Pain free.    2. Tolerating adequate PO diet: Yes    3. Surgical Intervention planned: No    4. Cleared by consultants (if involved): N/A    5. Return to near baseline physical activity: Yes    Discharge Planner Nurse   Safe discharge environment identified: Yes  Barriers to discharge: No       Entered by: Rowena Aceves 06/21/2020 8:41 AM     Please review provider order for any additional goals.   Nurse to notify provider when observation goals have been met and patient is ready for discharge.

## 2020-06-21 NOTE — PLAN OF CARE
"PRIMARY DIAGNOSIS: ACUTE RENAL COLIC    OUTPATIENT/OBSERVATION GOALS TO BE MET BEFORE DISCHARGE  1. Pain Status: Pain free.    2. Tolerating adequate PO diet: Yes    3. Surgical Intervention planned: No    4. Cleared by consultants (if involved): N/A    5. Return to near baseline physical activity: Yes    Discharge Planner Nurse   Safe discharge environment identified: Yes  Barriers to discharge: To be cleared for discharge       Entered by: Morenita Junior 06/20/2020 9:59 PM     Please review provider order for any additional goals.   Nurse to notify provider when observation goals have been met and patient is ready for discharge.    Patient alert and oriented, independent in room. Urine being strained by staff, some sediment and 1 small piece of a stone present, patient took a picture on his cell phone of it, piece discarded. Will save further stones/sediment. Patient denies any pain or nausea, states he feels much better and wants to go home in the morning. Patient has LR running at 125/hr. Assessment WNL.     /73 (BP Location: Left arm)   Pulse 105   Temp 98.2  F (36.8  C) (Oral)   Resp 20   Ht 1.727 m (5' 8\")   Wt 110.5 kg (243 lb 8 oz)   SpO2 92%   BMI 37.02 kg/m     "

## 2020-06-21 NOTE — DISCHARGE SUMMARY
[facility]  Hospitalist Discharge Summary      Date of Admission:  6/20/2020  Date of Discharge:  6/21/2020  Discharging Provider: Gisele Kumar PA-C      Discharge Diagnoses   Left UVJ stone with mild hydroureter/hydronephrosis, passed  Right-sided nephrolithiasis  Possible CKD stage 3  Hypertension  Diabetes mellitus type 2, uncontrolled with A1c 7.3%  Dyslipidemia  History of CAD    Follow-ups Needed After Discharge   Follow-up Appointments     Follow-up and recommended labs and tests       Follow up with primary care provider, Kathy Chandra, within 3 weeks, for   hospital follow- up. The following labs/tests are recommended: Creatinine   (kidney function).   Follow up with Urology within one month for kidney stones and management.               Unresulted Labs Ordered in the Past 30 Days of this Admission     No orders found for last 31 day(s).      These results will be followed up by NA    Discharge Disposition   Discharged to home  Condition at discharge: Stable      Hospital Course   This is a 55 yo man with history of nephrolithiasis, hypertension, and DM2 admitted for acute intractable left flank pain and nausea and vomiting.  Found to have 3mm left UVJ stone.  Treated with IVF, tamsulosin, and analgesia and was able to pass stone while hospitalized.  Creatinine elevated to 1.6, similar to episode of nephrolithiasis 2/2020; recommend repeat creatinine at follow-up, unclear if he has developed chronic kidney disease.      Recommend follow-up with urology as outpatient; he has established care with urology within the past few months for management of kidney stones.     Consultations This Hospital Stay   None    Code Status   Full Code    Time Spent on this Encounter   I, Gisele Kumar PA-C, personally saw the patient today and spent less than or equal to 30 minutes discharging this patient.       Gisele Kumar PA-C  [facility]  Hospital  ______________________________________________________________________    Physical Exam   Vital Signs: Temp: 96.2  F (35.7  C) Temp src: Oral BP: 110/65 Pulse: 77 Heart Rate: 103 Resp: 14 SpO2: 94 % O2 Device: None (Room air)    Weight: 243 lbs 8 oz    Constitutional: comfortable appearing man sitting pu in bed  Eyes: no icerus  HEENT: mucous membranes moist  Respiratory: clear bilaterally  Cardiovascular: RRR, on murmur  GI: normoactive bowel sounds, soft, nontender  Lymph/Hematologic: no bruising  Genitourinary: no catheter.  Reviewed stones passed by photo  Skin: warm and dry  Musculoskeletal: normal muscle bulk and tone  Neurologic: nonfocal.   Psychiatric: alert, oriented, appropriate. Mildly anxious.            Primary Care Physician   Kathy Chandra    Discharge Orders      Reason for your hospital stay    You were in the hospital for evaluation and management of kidney stones.  The stone passed during your hospital stay.  We advise remaining active and drinking plenty of water as well as follow-up with your urologist.     Follow-up and recommended labs and tests     Follow up with primary care provider, Kathy Chandra, within 3 weeks, for hospital follow- up. The following labs/tests are recommended: Creatinine (kidney function).   Follow up with Urology within one month for kidney stones and management.     Activity    Your activity upon discharge: activity as tolerated     When to contact your care team    Call your primary doctor if you have any of the following: increased swelling, new pain, decreased urine output.     Full Code     Diet    Follow this diet upon discharge: Regular       Significant Results and Procedures   Most Recent 3 BMP's:  Recent Labs   Lab Test 06/21/20  0534 06/20/20  0748 02/16/20  0301    137 138   POTASSIUM 3.6 3.9 3.4   CHLORIDE 102 101 103   CO2 28 27 30   BUN 22 19 20   CR 1.65* 1.56* 1.65*   ANIONGAP 8 9 5   KATIANA 8.3* 9.0 9.1   * 193* 244*   ,   Results for  orders placed or performed during the hospital encounter of 06/20/20   CT Abdomen Pelvis w/o Contrast    Narrative    CT ABDOMEN AND PELVIS WITHOUT CONTRAST   6/20/2020 12:48 PM     HISTORY: Left flank pain, stone disease suspected.    TECHNIQUE: Noncontrast CT abdomen and pelvis was performed. Radiation  dose for this scan was reduced using automated exposure control,  adjustment of the mA and/or kV according to patient size, or iterative  reconstruction technique.    COMPARISON: CT abdomen and pelvis on 2/16/2020.    FINDINGS:   Lung bases: Mild bibasilar atelectasis.    Abdomen/pelvis:  Right kidney: Multiple kidney stones, largest measures 5 mm at the  upper pole (series 3 image 81). No ureteric stone or hydronephrosis.    Left kidney: Few kidney stones including 3 mm stone at the interpolar  region (series 3 image 98). 3 mm stone at the left ureterovesical  junction with associated mild hydroureter/hydronephrosis (series 3  image 220). Associated perinephric fat stranding is also noted.    Urinary bladder: Partially distended which precludes detailed  evaluation. 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    Remainder of the abdomen and pelvis: Limited evaluation of the  abdominal organs due to lack of IV contrast. The unenhanced liver,  gallbladder, spleen, adrenal glands and pancreas are grossly  unremarkable.    No abnormally dilated bowel loops. Scattered colonic diverticulosis  without CT evidence of acute diverticulitis. The appendix is  surgically absent. Moderate predominantly aortobiiliac atherosclerotic  vascular calcification. Soft tissue density at the lower portion of  the right inguinal canal, likely represents retracted testicle.    Bones and soft tissues: Mild degenerative changes of the spine.      Impression    IMPRESSION:  1. 3 mm stone at the left  ureterovesical junction with associated mild  hydroureter/hydronephrosis. Few other bilateral kidney stones measure  up to 5 mm at the upper pole of the right kidney. No right ureteric  stone or hydronephrosis.  2. 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  3. Scattered colonic diverticulosis without CT evidence of acute  diverticulitis.    STEFAN COPE MD       Discharge Medications   Current Discharge Medication List      CONTINUE these medications which have NOT CHANGED    Details   aspirin 81 MG EC tablet Take 81 mg by mouth daily       lisinopril-hydrochlorothiazide (ZESTORETIC) 20-12.5 MG tablet Take 1 tablet by mouth daily       metFORMIN (GLUCOPHAGE-XR) 500 MG 24 hr tablet Take 500 mg by mouth daily (with dinner)      omeprazole (PRILOSEC) 20 MG DR capsule Take 20 mg by mouth daily      rosuvastatin (CRESTOR) 40 MG tablet Take 40 mg by mouth daily           Allergies   Allergies   Allergen Reactions     Atorvastatin Rash

## 2020-06-21 NOTE — PLAN OF CARE
"PRIMARY DIAGNOSIS: ACUTE RENAL COLIC    OUTPATIENT/OBSERVATION GOALS TO BE MET BEFORE DISCHARGE  1. Pain Status: Pain free.    2. Tolerating adequate PO diet: NPO except for ice chips, denies nausea    3. Surgical Intervention planned: No    4. Cleared by consultants (if involved): N/A    5. Return to near baseline physical activity: Yes    Discharge Planner Nurse   Safe discharge environment identified: Yes  Barriers to discharge: To be cleared for discharge       Entered by: Morenita Junior 06/21/2020 5:11 AM     Please review provider order for any additional goals.   Nurse to notify provider when observation goals have been met and patient is ready for discharge.    Patient denies any nausea or pain. Urine strained, no further stones found, will continue to monitor output.     /65 (BP Location: Left arm)   Pulse 77   Temp 96.2  F (35.7  C) (Oral)   Resp 14   Ht 1.727 m (5' 8\")   Wt 110.5 kg (243 lb 8 oz)   SpO2 94%   BMI 37.02 kg/m     "

## 2020-06-21 NOTE — PLAN OF CARE
PRIMARY DIAGNOSIS: ACUTE RENAL COLIC    OUTPATIENT/OBSERVATION GOALS TO BE MET BEFORE DISCHARGE  1. Pain Status: Reports 2/10 (L) flank pain. Declines intervention.    2. Tolerating adequate PO diet: Yes    3. Surgical Intervention planned: No    4. Cleared by consultants (if involved): N/A    5. Return to near baseline physical activity: Yes    Discharge Planner Nurse   Safe discharge environment identified: Yes  Barriers to discharge: Pain & Nausea control       Entered by: Rachael Rob 06/20/2020        Arrived to floor 15:30. Denies nausea. Reports 2/10 pain, declines intervention. Patient calling to have urine strained. Up ind; steady gait observed. IVF infusing. Plan: flomax, IVF, strain urine    Please review provider order for any additional goals.   Nurse to notify provider when observation goals have been met and patient is ready for discharge.

## 2020-11-14 ENCOUNTER — APPOINTMENT (OUTPATIENT)
Dept: CT IMAGING | Facility: CLINIC | Age: 55
End: 2020-11-14
Attending: EMERGENCY MEDICINE
Payer: COMMERCIAL

## 2020-11-14 ENCOUNTER — HOSPITAL ENCOUNTER (EMERGENCY)
Facility: CLINIC | Age: 55
Discharge: HOME | End: 2020-11-15
Attending: EMERGENCY MEDICINE | Admitting: EMERGENCY MEDICINE
Payer: COMMERCIAL

## 2020-11-14 VITALS
DIASTOLIC BLOOD PRESSURE: 98 MMHG | HEART RATE: 76 BPM | RESPIRATION RATE: 18 BRPM | OXYGEN SATURATION: 98 % | TEMPERATURE: 97.8 F | SYSTOLIC BLOOD PRESSURE: 149 MMHG

## 2020-11-14 DIAGNOSIS — N13.2 HYDRONEPHROSIS WITH URINARY OBSTRUCTION DUE TO URETERAL CALCULUS: ICD-10-CM

## 2020-11-14 LAB
ALBUMIN UR-MCNC: 20 MG/DL
ANION GAP SERPL CALCULATED.3IONS-SCNC: 5 MMOL/L (ref 3–14)
APPEARANCE UR: CLEAR
BASOPHILS # BLD AUTO: 0.1 10E9/L (ref 0–0.2)
BASOPHILS NFR BLD AUTO: 0.5 %
BILIRUB UR QL STRIP: NEGATIVE
BUN SERPL-MCNC: 19 MG/DL (ref 7–30)
CALCIUM SERPL-MCNC: 8.8 MG/DL (ref 8.5–10.1)
CHLORIDE SERPL-SCNC: 104 MMOL/L (ref 94–109)
CO2 SERPL-SCNC: 29 MMOL/L (ref 20–32)
COLOR UR AUTO: ABNORMAL
CREAT SERPL-MCNC: 1.62 MG/DL (ref 0.66–1.25)
DIFFERENTIAL METHOD BLD: ABNORMAL
EOSINOPHIL # BLD AUTO: 0.2 10E9/L (ref 0–0.7)
EOSINOPHIL NFR BLD AUTO: 1.2 %
ERYTHROCYTE [DISTWIDTH] IN BLOOD BY AUTOMATED COUNT: 12.3 % (ref 10–15)
GFR SERPL CREATININE-BSD FRML MDRD: 48 ML/MIN/{1.73_M2}
GLUCOSE SERPL-MCNC: 207 MG/DL (ref 70–99)
GLUCOSE UR STRIP-MCNC: NEGATIVE MG/DL
HCT VFR BLD AUTO: 43.9 % (ref 40–53)
HGB BLD-MCNC: 14 G/DL (ref 13.3–17.7)
HGB UR QL STRIP: ABNORMAL
IMM GRANULOCYTES # BLD: 0.1 10E9/L (ref 0–0.4)
IMM GRANULOCYTES NFR BLD: 0.6 %
KETONES UR STRIP-MCNC: NEGATIVE MG/DL
LEUKOCYTE ESTERASE UR QL STRIP: NEGATIVE
LYMPHOCYTES # BLD AUTO: 1.6 10E9/L (ref 0.8–5.3)
LYMPHOCYTES NFR BLD AUTO: 12.6 %
MCH RBC QN AUTO: 28.9 PG (ref 26.5–33)
MCHC RBC AUTO-ENTMCNC: 31.9 G/DL (ref 31.5–36.5)
MCV RBC AUTO: 91 FL (ref 78–100)
MONOCYTES # BLD AUTO: 0.6 10E9/L (ref 0–1.3)
MONOCYTES NFR BLD AUTO: 4.9 %
MUCOUS THREADS #/AREA URNS LPF: PRESENT /LPF
NEUTROPHILS # BLD AUTO: 10 10E9/L (ref 1.6–8.3)
NEUTROPHILS NFR BLD AUTO: 80.2 %
NITRATE UR QL: NEGATIVE
NRBC # BLD AUTO: 0 10*3/UL
NRBC BLD AUTO-RTO: 0 /100
PH UR STRIP: 5 PH (ref 5–7)
PLATELET # BLD AUTO: 169 10E9/L (ref 150–450)
POTASSIUM SERPL-SCNC: 3.9 MMOL/L (ref 3.4–5.3)
RBC # BLD AUTO: 4.84 10E12/L (ref 4.4–5.9)
RBC #/AREA URNS AUTO: 28 /HPF (ref 0–2)
SODIUM SERPL-SCNC: 138 MMOL/L (ref 133–144)
SOURCE: ABNORMAL
SP GR UR STRIP: 1.02 (ref 1–1.03)
UROBILINOGEN UR STRIP-MCNC: NORMAL MG/DL (ref 0–2)
WBC # BLD AUTO: 12.5 10E9/L (ref 4–11)
WBC #/AREA URNS AUTO: <1 /HPF (ref 0–5)

## 2020-11-14 PROCEDURE — 87086 URINE CULTURE/COLONY COUNT: CPT | Performed by: EMERGENCY MEDICINE

## 2020-11-14 PROCEDURE — 250N000013 HC RX MED GY IP 250 OP 250 PS 637: Performed by: EMERGENCY MEDICINE

## 2020-11-14 PROCEDURE — 96361 HYDRATE IV INFUSION ADD-ON: CPT

## 2020-11-14 PROCEDURE — 81001 URINALYSIS AUTO W/SCOPE: CPT | Performed by: EMERGENCY MEDICINE

## 2020-11-14 PROCEDURE — 74176 CT ABD & PELVIS W/O CONTRAST: CPT

## 2020-11-14 PROCEDURE — 258N000003 HC RX IP 258 OP 636: Performed by: EMERGENCY MEDICINE

## 2020-11-14 PROCEDURE — 80048 BASIC METABOLIC PNL TOTAL CA: CPT | Performed by: EMERGENCY MEDICINE

## 2020-11-14 PROCEDURE — 96374 THER/PROPH/DIAG INJ IV PUSH: CPT

## 2020-11-14 PROCEDURE — 85025 COMPLETE CBC W/AUTO DIFF WBC: CPT | Performed by: EMERGENCY MEDICINE

## 2020-11-14 PROCEDURE — 99285 EMERGENCY DEPT VISIT HI MDM: CPT | Mod: 25

## 2020-11-14 PROCEDURE — 250N000011 HC RX IP 250 OP 636: Performed by: EMERGENCY MEDICINE

## 2020-11-14 PROCEDURE — 99284 EMERGENCY DEPT VISIT MOD MDM: CPT | Mod: 25

## 2020-11-14 PROCEDURE — 96375 TX/PRO/DX INJ NEW DRUG ADDON: CPT

## 2020-11-14 RX ORDER — TAMSULOSIN HYDROCHLORIDE 0.4 MG/1
0.4 CAPSULE ORAL ONCE
Status: COMPLETED | OUTPATIENT
Start: 2020-11-14 | End: 2020-11-14

## 2020-11-14 RX ORDER — KETOROLAC TROMETHAMINE 15 MG/ML
15 INJECTION, SOLUTION INTRAMUSCULAR; INTRAVENOUS ONCE
Status: COMPLETED | OUTPATIENT
Start: 2020-11-14 | End: 2020-11-14

## 2020-11-14 RX ORDER — HYDROCODONE BITARTRATE AND ACETAMINOPHEN 5; 325 MG/1; MG/1
1 TABLET ORAL EVERY 6 HOURS PRN
Qty: 10 TABLET | Refills: 0 | Status: SHIPPED | OUTPATIENT
Start: 2020-11-14 | End: 2020-11-14

## 2020-11-14 RX ORDER — ONDANSETRON 4 MG/1
4 TABLET, ORALLY DISINTEGRATING ORAL EVERY 8 HOURS PRN
Qty: 10 TABLET | Refills: 0 | Status: SHIPPED | OUTPATIENT
Start: 2020-11-14 | End: 2020-11-14

## 2020-11-14 RX ORDER — CEFTRIAXONE 1 G/1
1 INJECTION, POWDER, FOR SOLUTION INTRAMUSCULAR; INTRAVENOUS ONCE
Status: COMPLETED | OUTPATIENT
Start: 2020-11-14 | End: 2020-11-14

## 2020-11-14 RX ORDER — HYDROCODONE BITARTRATE AND ACETAMINOPHEN 5; 325 MG/1; MG/1
1 TABLET ORAL ONCE
Status: DISCONTINUED | OUTPATIENT
Start: 2020-11-14 | End: 2020-11-14

## 2020-11-14 RX ORDER — TAMSULOSIN HYDROCHLORIDE 0.4 MG/1
0.4 CAPSULE ORAL DAILY
Qty: 10 CAPSULE | Refills: 0 | Status: SHIPPED | OUTPATIENT
Start: 2020-11-15 | End: 2020-11-14

## 2020-11-14 RX ADMIN — KETOROLAC TROMETHAMINE 15 MG: 15 INJECTION, SOLUTION INTRAMUSCULAR; INTRAVENOUS at 21:51

## 2020-11-14 RX ADMIN — SODIUM CHLORIDE 1000 ML: 9 INJECTION, SOLUTION INTRAVENOUS at 21:52

## 2020-11-14 RX ADMIN — CEFTRIAXONE 1 G: 1 INJECTION, POWDER, FOR SOLUTION INTRAMUSCULAR; INTRAVENOUS at 23:43

## 2020-11-14 RX ADMIN — TAMSULOSIN HYDROCHLORIDE 0.4 MG: 0.4 CAPSULE ORAL at 23:43

## 2020-11-14 ASSESSMENT — ENCOUNTER SYMPTOMS
VOMITING: 1
NAUSEA: 1
FLANK PAIN: 1
HEMATURIA: 0
ABDOMINAL PAIN: 1

## 2020-11-14 NOTE — ED AVS SNAPSHOT
[facility]  [address]  [facility] [address]  Phone: [phone]  Fax: [phone]                                    Ashutosh Moraes   MRN: [id number]    Department: [facility] Emergency [facility]   Date of Visit: 11/14/2020           After Visit Summary Signature Page    I have received my discharge instructions, and my questions have been answered. I have discussed any challenges I see with this plan with the nurse or doctor.    ..........................................................................................................................................  Patient/Patient Representative Signature      ..........................................................................................................................................  Patient Representative Print Name and Relationship to Patient    ..................................................               ................................................  Date                                   Time    ..........................................................................................................................................  Reviewed by Signature/Title    ...................................................              ..............................................  Date                                               Time          22EPIC Rev 08/18

## 2020-11-15 RX ORDER — HYDROCODONE BITARTRATE AND ACETAMINOPHEN 5; 325 MG/1; MG/1
1 TABLET ORAL EVERY 6 HOURS PRN
Qty: 12 TABLET | Refills: 0 | Status: SHIPPED | OUTPATIENT
Start: 2020-11-14 | End: 2020-11-17

## 2020-11-15 RX ORDER — TAMSULOSIN HYDROCHLORIDE 0.4 MG/1
0.4 CAPSULE ORAL DAILY
Qty: 10 CAPSULE | Refills: 0 | Status: SHIPPED | OUTPATIENT
Start: 2020-11-14 | End: 2020-11-24

## 2020-11-15 RX ORDER — ONDANSETRON 4 MG/1
4 TABLET, ORALLY DISINTEGRATING ORAL EVERY 8 HOURS PRN
Qty: 10 TABLET | Refills: 0 | Status: SHIPPED | OUTPATIENT
Start: 2020-11-14 | End: 2020-11-17

## 2020-11-15 NOTE — ED PROVIDER NOTES
History     Chief Complaint:  Flank Pain      The history is provided by the patient.      Ashutosh Moraes is a 54 year old male with a history of kidney stones who presents for evaluation of flank pain. Today around 1800 the patient experienced the sudden onset of sharp right flank pain and right-sided abdominal pain with associated nausea and non-bloody vomiting. Since onset his pain has been progressively worsening, prompting him to seek evaluation in the ED. He has taken Tylenol at home with limited improvement of his pain. He reports that his pain today is similar to what he has previously experienced with kidney stones. He has had some urinary urgency but no hematuria or testicular pain. Notably, the patient had a colonoscopy and endoscopy performed on 11/11, following which he reports that he felt well. He has not previously required lithotripsy.      Allergies:  Atorvastatin      Medications:    Aspirin 81 mg   Lisinopril-hydrochlorothiazide   Metformin   Omeprazole  Crestor      Past Medical History:    CAD   GERD   Hypertension  Hyperlipidemia  Kidney stones     Past Surgical History:    Appendectomy  Heart cath left heart     Family History:    Asthma - Mother   CAD - Father      Social History:  Tobacco use:    Never smoker   Alcohol use:    Negative   Drug use:    Negative   Marital status:       Accompanied to ED by:  Alone      Review of Systems   Gastrointestinal: Positive for abdominal pain, nausea and vomiting.   Genitourinary: Positive for flank pain (right) and urgency. Negative for hematuria and testicular pain.   All other systems reviewed and are negative.      Physical Exam     Patient Vitals for the past 24 hrs:   BP Temp Pulse Resp SpO2   11/14/20 2019 (!) 149/98 97.8  F (36.6  C) 76 18 98 %       Physical Exam  Constitutional: Vital signs reviewed as above.   HENT:    Head: No external signs of trauma noted.   Eyes: Conjunctivae are normal. Pupils are equal, round, and reactive to  light.   Cardiovascular:    Normal rate, regular rhythm and normal heart sounds.     Exam reveals no friction rub.     No murmur heard.  Pulmonary/Chest:    Effort normal and breath sounds normal.    No respiratory distress.    There are no wheezes.    There are no rales.   Gastrointestinal:    Soft.    Bowel sounds normal.    There is no distension.    There is RLQ tenderness.    There is no rebound or guarding.   Musculoskeletal:    Normal range of motion.    Normal Tone   No CVA tenderness  Neurological: Patient is alert and oriented to person, place, and time.   Skin: Skin is warm and dry. Patient is not diaphoretic  Psychiatric: The patient appears anxious     Emergency Department Course      Imaging:  Radiographic findings were communicated with the patient who voiced understanding of the findings.     Abd/Pelvis CT No Contrast - Stone Protocol:  IMPRESSION:   1.  Mild right hydroureteronephrosis secondary to a 5 mm stone in the proximal ureter.   2.  Bilateral calcified intrarenal stones.   3.  Colonic diverticulosis.   Per radiology.      Laboratory:  CBC: WBC 12.5 high, o/w WNL (HGB 14.0, )   BMP: Glucose 207 high, Creatinine 1.62 high, GFR estimate 48 low, o/w WNL   UA with Microscopic: Moderate blood, Protein albumin 20, RBC 28 high, Mucous present, o/w Negative    Urine Culture: Pending      Interventions:  2151 Toradol 15 mg IV   2152 NS 1,000 mL IV   2343 Flomax 0.4 mg PO   2343 Rocephin 1 g IV      Emergency Department Course:  Nursing notes and vitals reviewed.  2130: I performed an exam of the patient as documented above.     ED Course as of Nov 15 0301   Sat Nov 14, 2020 2248 I updated and reassessed the patient.       2303 Finished reassessment.  Patient had multiple questions.  These were answered best as able.      2310 I spoke with Dr. Kenyon of the urology service regarding patient's presentation, findings, and plan of care.        2329 Recheck.  Patient still doing well after  Toradol.  Discussed plan for discharge.  Patient should follow with urology and certainly return to the ED with any new or worsening symptoms.        I personally reviewed the laboratory results with the patient and answered all related questions prior to discharge.     Findings and plan explained to the Patient. Patient discharged home with instructions regarding supportive care, medications, and reasons to return. The importance of close follow-up was reviewed. The patient was prescribed Norco, Zofran ODT, and Flomax.      Impression & Plan       Medical Decision Making:  This 54-year-old male patient presents to the ED due to concerns for kidney stone.  Please see the HPI and exam for specifics.  Patient's work-up did reveal a 5 mm proximal ureteral stone.  Patient's creatinine appears stable.  No UTI was noted on urinalysis.  Some stranding was noted on CT.  Case was discussed with urology.  Ultimately, the patient is able to be discharged but urology recommended antibiotics prior to discharge and close outpatient follow-up this coming week.  Anticipatory guidance given prior to discharge.     Diagnosis:    ICD-10-CM    1. Hydronephrosis with urinary obstruction due to ureteral calculus  N13.2       Disposition:  Discharged to home with Norco, Zofran ODT, and Flomax.       Discharge Medications:  New Prescriptions    HYDROCODONE-ACETAMINOPHEN (NORCO) 5-325 MG TABLET    Take 1 tablet by mouth every 6 hours as needed for severe pain    ONDANSETRON (ZOFRAN ODT) 4 MG ODT TAB    Take 1 tablet (4 mg) by mouth every 8 hours as needed for vomiting    TAMSULOSIN (FLOMAX) 0.4 MG CAPSULE    Take 1 capsule (0.4 mg) by mouth daily for 10 doses         Aidan MARIN, am serving as a scribe at 9:30 PM on 11/14/2020 to document services personally performed by Dr. Staton, based on my observations and the provider's statements to me.      [facility] EMERGENCY DEPT       Gerson Staton, DO  11/15/20  [id number]

## 2020-11-15 NOTE — DISCHARGE INSTRUCTIONS
Diagnosis: Kidney stone with colic  What do you do next:   Continue your home medications unless we have specifically changed them  Take the medications I have prescribed as directed.  Follow up as indicated below    When do you return: If you have uncontrolled fevers, intractable vomiting, intractable abdominal pain, or any other symptoms that concern you, please return to the ED for reevaluation.    Thank you for allowing us to care for you today.

## 2020-11-16 LAB
BACTERIA SPEC CULT: NO GROWTH
Lab: NORMAL
SPECIMEN SOURCE: NORMAL

## 2020-11-17 ENCOUNTER — VIRTUAL VISIT (OUTPATIENT)
Dept: UROLOGY | Facility: CLINIC | Age: 55
End: 2020-11-17
Payer: COMMERCIAL

## 2020-11-17 ENCOUNTER — PREP FOR PROCEDURE (OUTPATIENT)
Dept: UROLOGY | Facility: CLINIC | Age: 55
End: 2020-11-17

## 2020-11-17 VITALS — BODY MASS INDEX: 37.28 KG/M2 | WEIGHT: 246 LBS | HEIGHT: 68 IN

## 2020-11-17 DIAGNOSIS — N20.1 RIGHT URETERAL STONE: Primary | ICD-10-CM

## 2020-11-17 DIAGNOSIS — E66.01 MORBID OBESITY (H): ICD-10-CM

## 2020-11-17 DIAGNOSIS — N20.1 URETERAL STONE: Primary | ICD-10-CM

## 2020-11-17 PROBLEM — E11.9 DIABETES MELLITUS, TYPE 2 (H): Status: ACTIVE | Noted: 2020-11-17

## 2020-11-17 PROCEDURE — 99203 OFFICE O/P NEW LOW 30 MIN: CPT | Mod: 95 | Performed by: PHYSICIAN ASSISTANT

## 2020-11-17 RX ORDER — HYDROCODONE BITARTRATE AND ACETAMINOPHEN 5; 325 MG/1; MG/1
1 TABLET ORAL EVERY 6 HOURS PRN
Qty: 12 TABLET | Refills: 0 | Status: SHIPPED | OUTPATIENT
Start: 2020-11-17 | End: 2020-11-20

## 2020-11-17 RX ORDER — CEFAZOLIN SODIUM 1 G
1 VIAL (EA) INJECTION SEE ADMIN INSTRUCTIONS
Status: CANCELLED | OUTPATIENT
Start: 2020-11-17

## 2020-11-17 RX ORDER — TAMSULOSIN HYDROCHLORIDE 0.4 MG/1
0.4 CAPSULE ORAL DAILY
Qty: 14 CAPSULE | Refills: 0 | Status: SHIPPED | OUTPATIENT
Start: 2020-11-17 | End: 2020-12-30

## 2020-11-17 RX ORDER — ACETAMINOPHEN 325 MG/1
975 TABLET ORAL ONCE
Status: CANCELLED | OUTPATIENT
Start: 2020-11-17 | End: 2020-11-17

## 2020-11-17 RX ORDER — ONDANSETRON 4 MG/1
4 TABLET, ORALLY DISINTEGRATING ORAL EVERY 8 HOURS PRN
Qty: 12 TABLET | Refills: 0 | Status: SHIPPED | OUTPATIENT
Start: 2020-11-17 | End: 2020-12-30

## 2020-11-17 ASSESSMENT — PAIN SCALES - GENERAL: PAINLEVEL: NO PAIN (1)

## 2020-11-17 ASSESSMENT — MIFFLIN-ST. JEOR: SCORE: 1940.35

## 2020-11-17 NOTE — LETTER
"11/17/2020       RE: Ashutosh Moraes  [address]  [facility] [address]     Dear Colleague,    Thank you for referring your patient, Ashutosh Moraes, to the [facility] [place] at [facility]. Please see a copy of my visit note below.    Ashutosh Moraes is a 54 year old male who is being evaluated via a billable video visit.      The patient has been notified of following:     \"This video visit will be conducted via a call between you and your physician/provider. We have found that certain health care needs can be provided without the need for an in-person physical exam.  This service lets us provide the care you need with a video conversation.  If a prescription is necessary we can send it directly to your pharmacy.  If lab work is needed we can place an order for that and you can then stop by our lab to have the test done at a later time.    Video visits are billed at different rates depending on your insurance coverage.  Please reach out to your insurance provider with any questions.    If during the course of the call the physician/provider feels a video visit is not appropriate, you will not be charged for this service.\"    Patient has given verbal consent for Video visit? Yes  How would you like to obtain your AVS? MyChart  If you are dropped from the video visit, the video invite should be resent to: Text to cell phone: [phone]  Will anyone else be joining your video visit? No      RONNIE Peters CMA    Video-Visit Details    Type of service:  Video Visit    Video Start Time: 2:12 PM  Video End Time: 2:33 PM    Originating Location (pt. Location): Home    Distant Location (provider location):  [facility] [place]     Platform used for Video Visit: Doximity    CC: kidney stones.    HPI: It is a pleasure to see . Ashutosh Moraes, a pleasant 54 year old male seen today via billable video visit in ER follow up for evaluation of " the above. This was first detected on CT in the ED on 11/14/2020 after the patient presented complaining of acute renal colic symptoms.  There was mild right hydroureteronephrosis secondary to a 5 mm stone in the proximal ureter (right 7mm renal stone, other small right renal stones and tiny left renal stones). UA in the ED was negative for infection. BMP revealed Cr and Ca to be normal. With pain under good control, the patient was discharged with a prescription for tamsulosin and instructions to follow up with urology.    Currently, the patient reports decent pain control, but taking Norco q 6 hours. The patient has been straining their urine with no captured stones thus far. Denies gross hematuria, dysuria, fevers, chills, N/V. Has prior history of kidney stones (4 episodes, calcium oxalate monohydrate).    RECENT IMAGING:  EXAM: CT ABDOMEN PELVIS W/O CONTRAST  LOCATION: [facility]  DATE/TIME: 11/14/2020 10:15 PM     INDICATION: Right lower quadrant pain.  COMPARISON: 02/16/2020.  TECHNIQUE: CT scan of the abdomen and pelvis was performed without IV contrast. Multiplanar reformats were obtained. Dose reduction techniques were used.  CONTRAST: None.     FINDINGS:   LOWER CHEST: Coronary artery calcification. Bibasilar atelectasis. Small hiatal hernia.     HEPATOBILIARY: Nondistended gallbladder. Normal noncontrast appearance of the liver.     PANCREAS: Mildly atrophic. No inflammatory change.     SPLEEN: Normal.     ADRENAL GLANDS: Normal.     KIDNEYS/BLADDER: There are 2 punctate left lower pole nonobstructing stones. No left hydronephrosis. On the right, there are multiple calcified intrarenal stones measuring up to 7 mm in the upper pole. There is mild right hydronephrosis and perinephric   stranding secondary to a 5 mm stone in the proximal ureter just beyond the ureteropelvic junction. Normal bladder.     BOWEL: No bowel obstruction or free air. Mild colonic diverticulosis.     LYMPH NODES:  Normal.     VASCULATURE: Mild aortoiliac atherosclerotic calcification. No abdominal aortic aneurysm.     PELVIC ORGANS: Normal.     MUSCULOSKELETAL: No acute osseous findings.                                                                      IMPRESSION:   1.  Mild right hydroureteronephrosis secondary to a 5 mm stone in the proximal ureter.     2.  Bilateral calcified intrarenal stones.     3.  Colonic diverticulosis.       Past Medical History:   Diagnosis Date     CAD (coronary artery disease)     Cardiac cath 7/2009: RAMANA to      Gastro-oesophageal reflux disease      HLD (hyperlipidemia)      Hypertension      Kidney stones        Past Surgical History:   Procedure Laterality Date     APPENDECTOMY NOS       HEART CATH LEFT HEART CATH  07/02/2009    RAMANA to        Current Outpatient Medications   Medication Sig Dispense Refill     aspirin 81 MG EC tablet Take 81 mg by mouth daily        HYDROcodone-acetaminophen (NORCO) 5-325 MG tablet Take 1 tablet by mouth every 6 hours as needed for severe pain 12 tablet 0     lisinopril-hydrochlorothiazide (ZESTORETIC) 20-12.5 MG tablet Take 1 tablet by mouth daily        metFORMIN (GLUCOPHAGE-XR) 500 MG 24 hr tablet Take 500 mg by mouth daily (with dinner)       omeprazole (PRILOSEC) 20 MG DR capsule Take 20 mg by mouth daily       ondansetron (ZOFRAN ODT) 4 MG ODT tab Take 1 tablet (4 mg) by mouth every 8 hours as needed for vomiting 10 tablet 0     rosuvastatin (CRESTOR) 40 MG tablet Take 40 mg by mouth daily       tamsulosin (FLOMAX) 0.4 MG capsule Take 1 capsule (0.4 mg) by mouth daily for 10 doses 10 capsule 0       Allergies   Allergen Reactions     Atorvastatin Rash       FAMILY HISTORY: There is no family h/o  malignancy.  There is no family h/o urolithiasis.     Social History     Socioeconomic History     Marital status:      Spouse name: Not on file     Number of children: Not on file     Years of education: Not on file     Highest education level:  "Not on file   Occupational History     Not on file   Social Needs     Financial resource strain: Not on file     Food insecurity     Worry: Not on file     Inability: Not on file     Transportation needs     Medical: Not on file     Non-medical: Not on file   Tobacco Use     Smoking status: Never Smoker     Smokeless tobacco: Never Used   Substance and Sexual Activity     Alcohol use: No     Alcohol/week: 0.0 standard drinks     Drug use: No     Sexual activity: Not on file   Lifestyle     Physical activity     Days per week: Not on file     Minutes per session: Not on file     Stress: Not on file   Relationships     Social connections     Talks on phone: Not on file     Gets together: Not on file     Attends Anabaptist service: Not on file     Active member of club or organization: Not on file     Attends meetings of clubs or organizations: Not on file     Relationship status: Not on file     Intimate partner violence     Fear of current or ex partner: Not on file     Emotionally abused: Not on file     Physically abused: Not on file     Forced sexual activity: Not on file   Other Topics Concern     Parent/sibling w/ CABG, MI or angioplasty before 65F 55M? Not Asked      Service Not Asked     Blood Transfusions Not Asked     Caffeine Concern No     Comment: 1 a day     Occupational Exposure Not Asked     Hobby Hazards Not Asked     Sleep Concern Not Asked     Stress Concern Not Asked     Weight Concern Not Asked     Special Diet Not Asked     Back Care Not Asked     Exercise No     Comment: occasional. recently joined [employer]     Bike Helmet Not Asked     Seat Belt Not Asked     Self-Exams Not Asked   Social History Narrative     Not on file       ROS: A comprehensive 14 point ROS was obtained and otherwise negative except for that outlined above in the HPI.    GENERAL PHYSICAL EXAM:   Ht 1.727 m (5' 8\")   Wt 111.6 kg (246 lb)   BMI 37.40 kg/m    PSYCH: NAD  EYES: EOMI  MOUTH: MMM  NECK: Supple, no " notable adenopathy  RESP: Unlabored breathing  NEURO: AAO x3          ASSESSMENT AND PLAN: 54 year old male with a 5mm right-sided proximal calculus with associated  Hydronephrosis (and additional renal stones).   -Will arrange for ureteroscopy next week if he is unable to pass his stone over the weekend (given the COVID-19 pandemic and limited beds in both the EDs and hospitals), in hopes of preventing repeat ED/hospital visits.   - Continue tamsulosin 0.4 mg daily. Refills provided.  - Continue to push fluids. Strain all urine and submit any captured stones for analysis.  - Norco refilled for pain.  - Nephrology referral for metabolic analysis  - Warning signs discussed including fevers, chills, N/V, gross hematuria, severe pain that is refractory to medications which should prompt more urgent evaluation. Patient understands to proceed to the ER should these warning signs occur outside of clinic hours.    During counseling for this visit, we covered the natural history of kidney stones, the risk of progression to symptomatic pain/infection, and the possibility of renal failure/kidney damage.  We covered treatment options including medical expulsive therapy, ureteroscopy/laser/stent, extracorporeal shock wave lithotripsy (ESWL).       I have enjoyed participating in the medical care of this patient and will continue to keep you updated on his progress.  Please do not hesitate to contact me with any questions or concerns.      Mel Chen PA-C  [facility]

## 2020-11-17 NOTE — PROGRESS NOTES
"Ashutosh Moraes is a 54 year old male who is being evaluated via a billable video visit.      The patient has been notified of following:     \"This video visit will be conducted via a call between you and your physician/provider. We have found that certain health care needs can be provided without the need for an in-person physical exam.  This service lets us provide the care you need with a video conversation.  If a prescription is necessary we can send it directly to your pharmacy.  If lab work is needed we can place an order for that and you can then stop by our lab to have the test done at a later time.    Video visits are billed at different rates depending on your insurance coverage.  Please reach out to your insurance provider with any questions.    If during the course of the call the physician/provider feels a video visit is not appropriate, you will not be charged for this service.\"    Patient has given verbal consent for Video visit? Yes  How would you like to obtain your AVS? MyChart  If you are dropped from the video visit, the video invite should be resent to: Text to cell phone: [phone]  Will anyone else be joining your video visit? No      RONNIE Peters CMA    Video-Visit Details    Type of service:  Video Visit    Video Start Time: 2:12 PM  Video End Time: 2:33 PM    Originating Location (pt. Location): Home    Distant Location (provider location):  [facility] [place]     Platform used for Video Visit: [employer]    CC: kidney stones.    HPI: It is a pleasure to see Mr. Ashutosh Moraes, a pleasant 54 year old male seen today via billable video visit in ER follow up for evaluation of the above. This was first detected on CT in the ED on 11/14/2020 after the patient presented complaining of acute renal colic symptoms.  There was mild right hydroureteronephrosis secondary to a 5 mm stone in the proximal ureter (right 7mm renal stone, other small right renal stones and tiny left " renal stones). UA in the ED was negative for infection. BMP revealed Cr and Ca to be normal. With pain under good control, the patient was discharged with a prescription for tamsulosin and instructions to follow up with urology.    Currently, the patient reports decent pain control, but taking Norco q 6 hours. The patient has been straining their urine with no captured stones thus far. Denies gross hematuria, dysuria, fevers, chills, N/V. Has prior history of kidney stones (4 episodes, calcium oxalate monohydrate).    RECENT IMAGING:  EXAM: CT ABDOMEN PELVIS W/O CONTRAST  LOCATION: [facility]  DATE/TIME: 11/14/2020 10:15 PM     INDICATION: Right lower quadrant pain.  COMPARISON: 02/16/2020.  TECHNIQUE: CT scan of the abdomen and pelvis was performed without IV contrast. Multiplanar reformats were obtained. Dose reduction techniques were used.  CONTRAST: None.     FINDINGS:   LOWER CHEST: Coronary artery calcification. Bibasilar atelectasis. Small hiatal hernia.     HEPATOBILIARY: Nondistended gallbladder. Normal noncontrast appearance of the liver.     PANCREAS: Mildly atrophic. No inflammatory change.     SPLEEN: Normal.     ADRENAL GLANDS: Normal.     KIDNEYS/BLADDER: There are 2 punctate left lower pole nonobstructing stones. No left hydronephrosis. On the right, there are multiple calcified intrarenal stones measuring up to 7 mm in the upper pole. There is mild right hydronephrosis and perinephric   stranding secondary to a 5 mm stone in the proximal ureter just beyond the ureteropelvic junction. Normal bladder.     BOWEL: No bowel obstruction or free air. Mild colonic diverticulosis.     LYMPH NODES: Normal.     VASCULATURE: Mild aortoiliac atherosclerotic calcification. No abdominal aortic aneurysm.     PELVIC ORGANS: Normal.     MUSCULOSKELETAL: No acute osseous findings.                                                                      IMPRESSION:   1.  Mild right hydroureteronephrosis  secondary to a 5 mm stone in the proximal ureter.     2.  Bilateral calcified intrarenal stones.     3.  Colonic diverticulosis.       Past Medical History:   Diagnosis Date     CAD (coronary artery disease)     Cardiac cath 7/2009: RAMANA to [facility]     Gastro-oesophageal reflux disease      HLD (hyperlipidemia)      Hypertension      Kidney stones        Past Surgical History:   Procedure Laterality Date     APPENDECTOMY NOS       HEART CATH LEFT HEART CATH  07/02/2009    RAMANA to [facility]       Current Outpatient Medications   Medication Sig Dispense Refill     aspirin 81 MG EC tablet Take 81 mg by mouth daily        HYDROcodone-acetaminophen (NORCO) 5-325 MG tablet Take 1 tablet by mouth every 6 hours as needed for severe pain 12 tablet 0     lisinopril-hydrochlorothiazide (ZESTORETIC) 20-12.5 MG tablet Take 1 tablet by mouth daily        metFORMIN (GLUCOPHAGE-XR) 500 MG 24 hr tablet Take 500 mg by mouth daily (with dinner)       omeprazole (PRILOSEC) 20 MG DR capsule Take 20 mg by mouth daily       ondansetron (ZOFRAN ODT) 4 MG ODT tab Take 1 tablet (4 mg) by mouth every 8 hours as needed for vomiting 10 tablet 0     rosuvastatin (CRESTOR) 40 MG tablet Take 40 mg by mouth daily       tamsulosin (FLOMAX) 0.4 MG capsule Take 1 capsule (0.4 mg) by mouth daily for 10 doses 10 capsule 0       Allergies   Allergen Reactions     Atorvastatin Rash       FAMILY HISTORY: There is no family h/o  malignancy.  There is no family h/o urolithiasis.     Social History     Socioeconomic History     Marital status:      Spouse name: Not on file     Number of children: Not on file     Years of education: Not on file     Highest education level: Not on file   Occupational History     Not on file   Social Needs     Financial resource strain: Not on file     Food insecurity     Worry: Not on file     Inability: Not on file     Transportation needs     Medical: Not on file     Non-medical: Not on file   Tobacco Use     Smoking status: Never  "Smoker     Smokeless tobacco: Never Used   Substance and Sexual Activity     Alcohol use: No     Alcohol/week: 0.0 standard drinks     Drug use: No     Sexual activity: Not on file   Lifestyle     Physical activity     Days per week: Not on file     Minutes per session: Not on file     Stress: Not on file   Relationships     Social connections     Talks on phone: Not on file     Gets together: Not on file     Attends Taoist service: Not on file     Active member of club or organization: Not on file     Attends meetings of clubs or organizations: Not on file     Relationship status: Not on file     Intimate partner violence     Fear of current or ex partner: Not on file     Emotionally abused: Not on file     Physically abused: Not on file     Forced sexual activity: Not on file   Other Topics Concern     Parent/sibling w/ CABG, MI or angioplasty before 65F 55M? Not Asked      Service Not Asked     Blood Transfusions Not Asked     Caffeine Concern No     Comment: 1 a day     Occupational Exposure Not Asked     Hobby Hazards Not Asked     Sleep Concern Not Asked     Stress Concern Not Asked     Weight Concern Not Asked     Special Diet Not Asked     Back Care Not Asked     Exercise No     Comment: occasional. recently joined [employer]     Bike Helmet Not Asked     Seat Belt Not Asked     Self-Exams Not Asked   Social History Narrative     Not on file       ROS: A comprehensive 14 point ROS was obtained and otherwise negative except for that outlined above in the HPI.    GENERAL PHYSICAL EXAM:   Ht 1.727 m (5' 8\")   Wt 111.6 kg (246 lb)   BMI 37.40 kg/m    PSYCH: NAD  EYES: EOMI  MOUTH: MMM  NECK: Supple, no notable adenopathy  RESP: Unlabored breathing  NEURO: AAO x3          ASSESSMENT AND PLAN: 54 year old male with a 5mm right-sided proximal calculus with associated  Hydronephrosis (and additional renal stones).   -Will arrange for ureteroscopy next week if he is unable to pass his stone over the " weekend (given the COVID-19 pandemic and limited beds in both the EDs and hospitals), in hopes of preventing repeat ED/hospital visits.   - Continue tamsulosin 0.4 mg daily. Refills provided.  - Continue to push fluids. Strain all urine and submit any captured stones for analysis.  - Norco refilled for pain.  - Nephrology referral for metabolic analysis  - Warning signs discussed including fevers, chills, N/V, gross hematuria, severe pain that is refractory to medications which should prompt more urgent evaluation. Patient understands to proceed to the ER should these warning signs occur outside of clinic hours.    During counseling for this visit, we covered the natural history of kidney stones, the risk of progression to symptomatic pain/infection, and the possibility of renal failure/kidney damage.  We covered treatment options including medical expulsive therapy, ureteroscopy/laser/stent, extracorporeal shock wave lithotripsy (ESWL).       I have enjoyed participating in the medical care of this patient and will continue to keep you updated on his progress.  Please do not hesitate to contact me with any questions or concerns.      Mel Chen PA-C  [facility]

## 2020-11-17 NOTE — PATIENT INSTRUCTIONS
Please make an appt with Nephrology for recurrent kidney stones.   [facility]  [address]  [address]  T:  [phone]      Refills of zofran, tamsulosin and Norco.    Call me if you pass the stone!

## 2020-11-18 ENCOUNTER — HOSPITAL ENCOUNTER (OUTPATIENT)
Facility: CLINIC | Age: 55
End: 2020-11-18
Attending: STUDENT IN AN ORGANIZED HEALTH CARE EDUCATION/TRAINING PROGRAM | Admitting: STUDENT IN AN ORGANIZED HEALTH CARE EDUCATION/TRAINING PROGRAM
Payer: COMMERCIAL

## 2020-11-18 DIAGNOSIS — N20.1 RIGHT URETERAL STONE: ICD-10-CM

## 2020-11-18 DIAGNOSIS — Z11.59 ENCOUNTER FOR SCREENING FOR OTHER VIRAL DISEASES: Primary | ICD-10-CM

## 2020-11-20 ENCOUNTER — TELEPHONE (OUTPATIENT)
Dept: UROLOGY | Facility: CLINIC | Age: 55
End: 2020-11-20

## 2020-11-20 DIAGNOSIS — N20.0 KIDNEY STONE: Primary | ICD-10-CM

## 2020-11-20 RX ORDER — HYDROCODONE BITARTRATE AND ACETAMINOPHEN 5; 325 MG/1; MG/1
1 TABLET ORAL EVERY 6 HOURS PRN
Qty: 12 TABLET | Refills: 0 | Status: SHIPPED | OUTPATIENT
Start: 2020-11-20 | End: 2020-12-30

## 2020-11-20 NOTE — TELEPHONE ENCOUNTER
Mel Chen PA-C Hanson, Cynthia L, RN   Caller: Unspecified (Today, 11:15 AM)             I will call in more narcotics now.   [facility] Call Center    Phone Message    May a detailed message be left on voicemail: yes     Reason for Call: Medication Refill Request    Has the patient contacted the pharmacy for the refill? Yes   Name of medication being requested: Hyrdocodone   Provider who prescribed the medication: Mel Chen  Pharmacy: [facility] [place]  Date medication is needed: ASAP         Action Taken: Other: [facility]    Travel Screening: Not Applicable

## 2020-11-20 NOTE — TELEPHONE ENCOUNTER
Health Call Center    Phone Message    May a detailed message be left on voicemail: no     Reason for Call: Medication Refill Request    Has the patient contacted the pharmacy for the refill? Yes   Name of medication being requested: HYDROcodone-acetaminophen (NORCO) 5-325 MG tablet  Provider who prescribed the medication: Mel RICO  Pharmacy: [facility]  Date medication is needed: ASAP, Pt has 1 pill left, says he needs enough to last him until surgery 11/27/20. Please call Pt back for any questions.      Action Taken: Message routed to:  Other:  CLINICAL POOL    Travel Screening: Not Applicable

## 2020-11-24 DIAGNOSIS — Z11.59 ENCOUNTER FOR SCREENING FOR OTHER VIRAL DISEASES: ICD-10-CM

## 2020-11-24 PROCEDURE — U0003 INFECTIOUS AGENT DETECTION BY NUCLEIC ACID (DNA OR RNA); SEVERE ACUTE RESPIRATORY SYNDROME CORONAVIRUS 2 (SARS-COV-2) (CORONAVIRUS DISEASE [COVID-19]), AMPLIFIED PROBE TECHNIQUE, MAKING USE OF HIGH THROUGHPUT TECHNOLOGIES AS DESCRIBED BY CMS-2020-01-R: HCPCS | Performed by: STUDENT IN AN ORGANIZED HEALTH CARE EDUCATION/TRAINING PROGRAM

## 2020-11-25 LAB
SARS-COV-2 RNA SPEC QL NAA+PROBE: NOT DETECTED
SPECIMEN SOURCE: NORMAL

## 2020-11-27 ENCOUNTER — HOSPITAL ENCOUNTER (OUTPATIENT)
Dept: CT IMAGING | Facility: CLINIC | Age: 55
Discharge: HOME | End: 2020-11-27
Attending: STUDENT IN AN ORGANIZED HEALTH CARE EDUCATION/TRAINING PROGRAM | Admitting: STUDENT IN AN ORGANIZED HEALTH CARE EDUCATION/TRAINING PROGRAM
Payer: COMMERCIAL

## 2020-11-27 DIAGNOSIS — N20.0 KIDNEY STONE: Primary | ICD-10-CM

## 2020-11-27 DIAGNOSIS — N20.0 KIDNEY STONE: ICD-10-CM

## 2020-11-27 PROCEDURE — 74176 CT ABD & PELVIS W/O CONTRAST: CPT

## 2020-12-30 ENCOUNTER — VIRTUAL VISIT (OUTPATIENT)
Dept: UROLOGY | Facility: CLINIC | Age: 55
End: 2020-12-30
Payer: COMMERCIAL

## 2020-12-30 ENCOUNTER — HOSPITAL ENCOUNTER (OUTPATIENT)
Dept: GENERAL RADIOLOGY | Facility: CLINIC | Age: 55
End: 2020-12-30
Attending: STUDENT IN AN ORGANIZED HEALTH CARE EDUCATION/TRAINING PROGRAM
Payer: COMMERCIAL

## 2020-12-30 ENCOUNTER — HOSPITAL ENCOUNTER (OUTPATIENT)
Dept: CT IMAGING | Facility: CLINIC | Age: 55
End: 2020-12-30
Attending: STUDENT IN AN ORGANIZED HEALTH CARE EDUCATION/TRAINING PROGRAM
Payer: COMMERCIAL

## 2020-12-30 VITALS — BODY MASS INDEX: 37.89 KG/M2 | HEIGHT: 68 IN | WEIGHT: 250 LBS

## 2020-12-30 DIAGNOSIS — R10.9 FLANK PAIN: ICD-10-CM

## 2020-12-30 DIAGNOSIS — N20.1 URETERAL STONE: ICD-10-CM

## 2020-12-30 DIAGNOSIS — N20.0 KIDNEY STONE: ICD-10-CM

## 2020-12-30 DIAGNOSIS — Z87.442 PERSONAL HISTORY OF URINARY CALCULI: ICD-10-CM

## 2020-12-30 PROCEDURE — 74019 RADEX ABDOMEN 2 VIEWS: CPT

## 2020-12-30 PROCEDURE — 99213 OFFICE O/P EST LOW 20 MIN: CPT | Mod: 95 | Performed by: STUDENT IN AN ORGANIZED HEALTH CARE EDUCATION/TRAINING PROGRAM

## 2020-12-30 PROCEDURE — 74176 CT ABD & PELVIS W/O CONTRAST: CPT

## 2020-12-30 RX ORDER — TAMSULOSIN HYDROCHLORIDE 0.4 MG/1
0.4 CAPSULE ORAL DAILY
Qty: 30 CAPSULE | Refills: 0 | Status: SHIPPED | OUTPATIENT
Start: 2020-12-30 | End: 2021-05-16

## 2020-12-30 RX ORDER — ACETAMINOPHEN 325 MG/1
975 TABLET ORAL ONCE
Status: CANCELLED | OUTPATIENT
Start: 2020-12-30 | End: 2020-12-30

## 2020-12-30 RX ORDER — ONDANSETRON 4 MG/1
4 TABLET, ORALLY DISINTEGRATING ORAL EVERY 8 HOURS PRN
Qty: 12 TABLET | Refills: 0 | Status: SHIPPED | OUTPATIENT
Start: 2020-12-30 | End: 2021-05-16

## 2020-12-30 RX ORDER — OXYCODONE HYDROCHLORIDE 5 MG/1
5 TABLET ORAL EVERY 6 HOURS PRN
Qty: 20 TABLET | Refills: 0 | Status: SHIPPED | OUTPATIENT
Start: 2020-12-30 | End: 2021-01-09

## 2020-12-30 RX ORDER — CEFAZOLIN SODIUM 1 G
1 VIAL (EA) INJECTION SEE ADMIN INSTRUCTIONS
Status: CANCELLED | OUTPATIENT
Start: 2020-12-30

## 2020-12-30 ASSESSMENT — PAIN SCALES - GENERAL: PAINLEVEL: NO PAIN (0)

## 2020-12-30 ASSESSMENT — MIFFLIN-ST. JEOR: SCORE: 1953.49

## 2020-12-30 NOTE — PROGRESS NOTES
"Ashutosh Moraes is a 55 year old male who is being evaluated via a billable video visit.      The patient has been notified of following:     \"This video visit will be conducted via a call between you and your physician/provider. We have found that certain health care needs can be provided without the need for an in-person physical exam.  This service lets us provide the care you need with a video conversation.  If a prescription is necessary we can send it directly to your pharmacy.  If lab work is needed we can place an order for that and you can then stop by our lab to have the test done at a later time.    Video visits are billed at different rates depending on your insurance coverage.  Please reach out to your insurance provider with any questions.    If during the course of the call the physician/provider feels a video visit is not appropriate, you will not be charged for this service.\"    Patient has given verbal consent for Video visit? Yes  How would you like to obtain your AVS? MyChart  If you are dropped from the video visit, the video invite should be resent to: Text to cell phone: [phone]  Will anyone else be joining your video visit? No      Video-Visit Details    Type of service:  Video Visit    Video Start Time: 9:36 AM  Video End Time: 9:48 AM    Originating Location (pt. Location): Home    Distant Location (provider location):  [facility] [place]     Platform used for Video Visit: [facility]     CHIEF COMPLAINT   Ashutosh Moraes who is a 55 year old male returns today for follow-up of nephrolithiasis.      HPI   Ashutosh Moraes is a 55 year old male who presents with a history of nephrolithiasis, multiple prior stone episodes.  Long history of kidney stones, had right renal colic last month and found to have a 5 mm right proximal ureteral stone, as well as multiple nonobstructive stones. Was planned for elective right ureteroscopy but then was able to pass the stone " "spontaneously and cancelled surgery.    On 12/24/2020 started having right flank pain again. Has been taking tylenol and left over oxycodone. Had some nausea but improved now. Is now out of pain medications and zofran. Denies fever or chills.    PHYSICAL EXAM  Patient is a 55 year old  male   Vitals: Height 1.727 m (5' 8\"), weight 113.4 kg (250 lb).  Body mass index is 38.01 kg/m .  General Appearance Adult:   Alert, no acute distress, oriented  HENT: throat/mouth:normal, good dentition  Lungs: no respiratory distress, or pursed lip breathing  Heart: No obvious jugular venous distension present  Abdomen: soft, nontender, no organomegaly or masses  Musculoskeltal: extremities normal, no peripheral edema  Skin: no suspicious lesions or rashes  Neuro: Alert, oriented, speech and mentation normal  Psych: affect and mood normal  Gait: Normal    All pertinent imaging reviewed:    CT abd/pelvis 12/30/2020    IMPRESSION:   1.  Obstructing 8 x 4 x 8 mm right proximal ureteral calculus with  mild upstream hydroureteronephrosis and minimal right perinephric  stranding. This stone has passed from the lower pole of the right  kidney into the ureter.  2.  Other bilateral nonobstructing renal calculi are unchanged.  3.  Hepatic steatosis.    KIDNEYS/BLADDER: Interval inferior passage of the right upper pole  renal calculus which is now located in the proximal ureter. This  calculus measures 8 x 4 x 8 mm and results in obstruction with mild  upstream hydroureteronephrosis and only mild right perinephric  stranding. Other small intrarenal calculi in both kidneys are  unchanged. The largest calculus at the lower pole of the right kidney  measures 6 x 6 mm and the largest calculus in the left kidney measures  3 mm. No calculi in the left ureter. No bladder calculi.        KUB 12/30/2020  IMPRESSION:    1. 0.6 cm calcific density is projected between the right lateral  transverse processes of L3 and L4 and likely represents the " ureteral  calculus seen on the prior CT. No other renal or ureteral calculus is  identified.  2. No evidence for bowel obstruction.  3. There are vestigial ribs at T12.        8 mm stone in right proximal ureter. 140 mm stsd      ASSESSMENT and PLAN  Ashutosh Moraes is a 55 year old male who presents with a history of nephrolithiasis, multiple prior stone episodes, now with 8 mm proximal right ureteral stone on CT abd/pelvis with additional nonobstructive stone burden, symptomatic with pain and n/v, but well controlled on medications thus far. Discussed that we will schedule for elective right ureteroscopy, laser lithotripsy, stone basketing, stent placement. Discussed risks including possible need for staged procedure. Stone is visible on KUB but skin to stone distance is too large for effective ESWL; URS with higher stone free rate and can treat the other nonobstructive stones as well. Small left nonobstructive stone will observe for now    - schedule elective right ureteroscopy, laser lithotripsy, stone basketing, stent placement  - rx for oxycodone, tamsulosin sent. Advised continue taking tylenol and then oxycodone for breakthrough  - return precautions discussed (uncontrolled pain/nausea/vomiting, fever -> go to ER)    Hudson Montana MD   [facility]  [facility] Phone: [phone]

## 2020-12-30 NOTE — LETTER
"12/30/2020       RE: Ashutosh Moraes  [address]  [place] [address]     Dear Colleague,    Thank you for referring your patient, Ashutosh Moraes, to the [facility] [place] at [facility]. Please see a copy of my visit note below.    Ashutosh Moraes is a 55 year old male who is being evaluated via a billable video visit.      The patient has been notified of following:     \"This video visit will be conducted via a call between you and your physician/provider. We have found that certain health care needs can be provided without the need for an in-person physical exam.  This service lets us provide the care you need with a video conversation.  If a prescription is necessary we can send it directly to your pharmacy.  If lab work is needed we can place an order for that and you can then stop by our lab to have the test done at a later time.    Video visits are billed at different rates depending on your insurance coverage.  Please reach out to your insurance provider with any questions.    If during the course of the call the physician/provider feels a video visit is not appropriate, you will not be charged for this service.\"    Patient has given verbal consent for Video visit? Yes  How would you like to obtain your AVS? MyChart  If you are dropped from the video visit, the video invite should be resent to: Text to cell phone: [phone]  Will anyone else be joining your video visit? No      Video-Visit Details    Type of service:  Video Visit    Video Start Time: 9:36 AM  Video End Time: 9:48 AM    Originating Location (pt. Location): Home    Distant Location (provider location):  [facility] [place]     Platform used for Video Visit: [employer]     CHIEF COMPLAINT   Ashutosh Moraes who is a 55 year old male returns today for follow-up of nephrolithiasis.      HPI   Ashutosh Moraes is a 55 year old male who presents with a history of " "nephrolithiasis, multiple prior stone episodes.  Long history of kidney stones, had right renal colic last month and found to have a 5 mm right proximal ureteral stone, as well as multiple nonobstructive stones. Was planned for elective right ureteroscopy but then was able to pass the stone spontaneously and cancelled surgery.    On 12/24/2020 started having right flank pain again. Has been taking tylenol and left over oxycodone. Had some nausea but improved now. Is now out of pain medications and zofran. Denies fever or chills.    PHYSICAL EXAM  Patient is a 55 year old  male   Vitals: Height 1.727 m (5' 8\"), weight 113.4 kg (250 lb).  Body mass index is 38.01 kg/m .  General Appearance Adult:   Alert, no acute distress, oriented  HENT: throat/mouth:normal, good dentition  Lungs: no respiratory distress, or pursed lip breathing  Heart: No obvious jugular venous distension present  Abdomen: soft, nontender, no organomegaly or masses  Musculoskeltal: extremities normal, no peripheral edema  Skin: no suspicious lesions or rashes  Neuro: Alert, oriented, speech and mentation normal  Psych: affect and mood normal  Gait: Normal    All pertinent imaging reviewed:    CT abd/pelvis 12/30/2020    IMPRESSION:   1.  Obstructing 8 x 4 x 8 mm right proximal ureteral calculus with  mild upstream hydroureteronephrosis and minimal right perinephric  stranding. This stone has passed from the lower pole of the right  kidney into the ureter.  2.  Other bilateral nonobstructing renal calculi are unchanged.  3.  Hepatic steatosis.    KIDNEYS/BLADDER: Interval inferior passage of the right upper pole  renal calculus which is now located in the proximal ureter. This  calculus measures 8 x 4 x 8 mm and results in obstruction with mild  upstream hydroureteronephrosis and only mild right perinephric  stranding. Other small intrarenal calculi in both kidneys are  unchanged. The largest calculus at the lower pole of the right kidney  measures " 6 x 6 mm and the largest calculus in the left kidney measures  3 mm. No calculi in the left ureter. No bladder calculi.        KUB 12/30/2020  IMPRESSION:    1. 0.6 cm calcific density is projected between the right lateral  transverse processes of L3 and L4 and likely represents the ureteral  calculus seen on the prior CT. No other renal or ureteral calculus is  identified.  2. No evidence for bowel obstruction.  3. There are vestigial ribs at T12.        8 mm stone in right proximal ureter. 140 mm stsd      ASSESSMENT and PLAN  Ashutosh Moraes is a 55 year old male who presents with a history of nephrolithiasis, multiple prior stone episodes, now with 8 mm proximal right ureteral stone on CT abd/pelvis with additional nonobstructive stone burden, symptomatic with pain and n/v, but well controlled on medications thus far. Discussed that we will schedule for elective right ureteroscopy, laser lithotripsy, stone basketing, stent placement. Discussed risks including possible need for staged procedure. Stone is visible on KUB but skin to stone distance is too large for effective ESWL; URS with higher stone free rate and can treat the other nonobstructive stones as well. Small left nonobstructive stone will observe for now    - schedule elective right ureteroscopy, laser lithotripsy, stone basketing, stent placement  - rx for oxycodone, tamsulosin sent. Advised continue taking tylenol and then oxycodone for breakthrough  - return precautions discussed (uncontrolled pain/nausea/vomiting, fever -> go to ER)    Hudson Montana MD   [facility]  [facility] Phone: [phone]

## 2020-12-30 NOTE — PATIENT INSTRUCTIONS
We will refill your pain medications    Reasons to go to the ER include uncontrolled pain, uncontrolled nausea and vomiting, or fever and chills.    We will schedule you for a right ureteroscopy for treatment of your right sided stones   Normal rate, regular rhythm

## 2021-01-03 DIAGNOSIS — Z11.59 ENCOUNTER FOR SCREENING FOR OTHER VIRAL DISEASES: Primary | ICD-10-CM

## 2021-01-09 ENCOUNTER — HEALTH MAINTENANCE LETTER (OUTPATIENT)
Age: 56
End: 2021-01-09

## 2021-01-17 ENCOUNTER — HOSPITAL ENCOUNTER (OUTPATIENT)
Dept: LAB | Facility: CLINIC | Age: 56
Discharge: HOME | End: 2021-01-17
Attending: STUDENT IN AN ORGANIZED HEALTH CARE EDUCATION/TRAINING PROGRAM | Admitting: STUDENT IN AN ORGANIZED HEALTH CARE EDUCATION/TRAINING PROGRAM
Payer: COMMERCIAL

## 2021-01-17 DIAGNOSIS — Z11.59 ENCOUNTER FOR SCREENING FOR OTHER VIRAL DISEASES: ICD-10-CM

## 2021-01-17 LAB
LABORATORY COMMENT REPORT: NORMAL
SARS-COV-2 RNA RESP QL NAA+PROBE: NEGATIVE
SARS-COV-2 RNA RESP QL NAA+PROBE: NORMAL
SPECIMEN SOURCE: NORMAL
SPECIMEN SOURCE: NORMAL

## 2021-01-17 PROCEDURE — U0003 INFECTIOUS AGENT DETECTION BY NUCLEIC ACID (DNA OR RNA); SEVERE ACUTE RESPIRATORY SYNDROME CORONAVIRUS 2 (SARS-COV-2) (CORONAVIRUS DISEASE [COVID-19]), AMPLIFIED PROBE TECHNIQUE, MAKING USE OF HIGH THROUGHPUT TECHNOLOGIES AS DESCRIBED BY CMS-2020-01-R: HCPCS | Performed by: STUDENT IN AN ORGANIZED HEALTH CARE EDUCATION/TRAINING PROGRAM

## 2021-01-17 PROCEDURE — U0005 INFEC AGEN DETEC AMPLI PROBE: HCPCS | Performed by: STUDENT IN AN ORGANIZED HEALTH CARE EDUCATION/TRAINING PROGRAM

## 2021-01-18 ENCOUNTER — MYC MEDICAL ADVICE (OUTPATIENT)
Dept: UROLOGY | Facility: CLINIC | Age: 56
End: 2021-01-18

## 2021-01-18 DIAGNOSIS — N20.1 RIGHT URETERAL STONE: Primary | ICD-10-CM

## 2021-01-19 ENCOUNTER — HOSPITAL ENCOUNTER (OUTPATIENT)
Dept: CT IMAGING | Facility: CLINIC | Age: 56
Discharge: HOME | End: 2021-01-19
Attending: STUDENT IN AN ORGANIZED HEALTH CARE EDUCATION/TRAINING PROGRAM | Admitting: STUDENT IN AN ORGANIZED HEALTH CARE EDUCATION/TRAINING PROGRAM
Payer: COMMERCIAL

## 2021-01-19 DIAGNOSIS — N20.1 RIGHT URETERAL STONE: ICD-10-CM

## 2021-01-19 PROCEDURE — 74176 CT ABD & PELVIS W/O CONTRAST: CPT

## 2021-01-20 NOTE — RESULT ENCOUNTER NOTE
Right distal ureteral stones with upstream hydro. Plan to proceed with right ureteroscopy as scheduled    Hudson Montana MD   [facility]  [phone] clinic phone

## 2021-01-21 ENCOUNTER — ANESTHESIA EVENT (OUTPATIENT)
Dept: SURGERY | Facility: CLINIC | Age: 56
End: 2021-01-21
Payer: COMMERCIAL

## 2021-01-21 ENCOUNTER — APPOINTMENT (OUTPATIENT)
Dept: GENERAL RADIOLOGY | Facility: CLINIC | Age: 56
End: 2021-01-21
Attending: STUDENT IN AN ORGANIZED HEALTH CARE EDUCATION/TRAINING PROGRAM
Payer: COMMERCIAL

## 2021-01-21 ENCOUNTER — HOSPITAL ENCOUNTER (OUTPATIENT)
Facility: CLINIC | Age: 56
Discharge: HOME | End: 2021-01-21
Attending: STUDENT IN AN ORGANIZED HEALTH CARE EDUCATION/TRAINING PROGRAM | Admitting: STUDENT IN AN ORGANIZED HEALTH CARE EDUCATION/TRAINING PROGRAM
Payer: COMMERCIAL

## 2021-01-21 ENCOUNTER — ANESTHESIA (OUTPATIENT)
Dept: SURGERY | Facility: CLINIC | Age: 56
End: 2021-01-21
Payer: COMMERCIAL

## 2021-01-21 VITALS
RESPIRATION RATE: 18 BRPM | WEIGHT: 240 LBS | HEIGHT: 68 IN | OXYGEN SATURATION: 99 % | DIASTOLIC BLOOD PRESSURE: 86 MMHG | HEART RATE: 76 BPM | BODY MASS INDEX: 36.37 KG/M2 | SYSTOLIC BLOOD PRESSURE: 134 MMHG | TEMPERATURE: 97 F

## 2021-01-21 DIAGNOSIS — N20.1 RIGHT URETERAL STONE: Primary | ICD-10-CM

## 2021-01-21 DIAGNOSIS — N20.1 URETERAL STONE: ICD-10-CM

## 2021-01-21 LAB
ANION GAP SERPL CALCULATED.3IONS-SCNC: 6 MMOL/L (ref 3–14)
BUN SERPL-MCNC: 31 MG/DL (ref 7–30)
CALCIUM SERPL-MCNC: 9 MG/DL (ref 8.5–10.1)
CHLORIDE SERPL-SCNC: 103 MMOL/L (ref 94–109)
CO2 SERPL-SCNC: 26 MMOL/L (ref 20–32)
COPATH REPORT: NORMAL
CREAT SERPL-MCNC: 2.22 MG/DL (ref 0.66–1.25)
ERYTHROCYTE [DISTWIDTH] IN BLOOD BY AUTOMATED COUNT: 12.2 % (ref 10–15)
GFR SERPL CREATININE-BSD FRML MDRD: 33 ML/MIN/{1.73_M2}
GLUCOSE BLDC GLUCOMTR-MCNC: 174 MG/DL (ref 70–99)
GLUCOSE BLDC GLUCOMTR-MCNC: 185 MG/DL (ref 70–99)
GLUCOSE SERPL-MCNC: 182 MG/DL (ref 70–99)
HCT VFR BLD AUTO: 39.2 % (ref 40–53)
HGB BLD-MCNC: 13.2 G/DL (ref 13.3–17.7)
MCH RBC QN AUTO: 29.1 PG (ref 26.5–33)
MCHC RBC AUTO-ENTMCNC: 33.7 G/DL (ref 31.5–36.5)
MCV RBC AUTO: 87 FL (ref 78–100)
PLATELET # BLD AUTO: 191 10E9/L (ref 150–450)
POTASSIUM SERPL-SCNC: 3.9 MMOL/L (ref 3.4–5.3)
RBC # BLD AUTO: 4.53 10E12/L (ref 4.4–5.9)
SODIUM SERPL-SCNC: 135 MMOL/L (ref 133–144)
WBC # BLD AUTO: 7.8 10E9/L (ref 4–11)

## 2021-01-21 PROCEDURE — 250N000013 HC RX MED GY IP 250 OP 250 PS 637: Performed by: STUDENT IN AN ORGANIZED HEALTH CARE EDUCATION/TRAINING PROGRAM

## 2021-01-21 PROCEDURE — 52356 CYSTO/URETERO W/LITHOTRIPSY: CPT | Mod: RT | Performed by: STUDENT IN AN ORGANIZED HEALTH CARE EDUCATION/TRAINING PROGRAM

## 2021-01-21 PROCEDURE — 250N000009 HC RX 250: Performed by: STUDENT IN AN ORGANIZED HEALTH CARE EDUCATION/TRAINING PROGRAM

## 2021-01-21 PROCEDURE — 250N000011 HC RX IP 250 OP 636: Performed by: STUDENT IN AN ORGANIZED HEALTH CARE EDUCATION/TRAINING PROGRAM

## 2021-01-21 PROCEDURE — 85027 COMPLETE CBC AUTOMATED: CPT | Performed by: STUDENT IN AN ORGANIZED HEALTH CARE EDUCATION/TRAINING PROGRAM

## 2021-01-21 PROCEDURE — 258N000001 HC RX 258: Performed by: STUDENT IN AN ORGANIZED HEALTH CARE EDUCATION/TRAINING PROGRAM

## 2021-01-21 PROCEDURE — 74420 UROGRAPHY RTRGR +-KUB: CPT | Mod: 26 | Performed by: STUDENT IN AN ORGANIZED HEALTH CARE EDUCATION/TRAINING PROGRAM

## 2021-01-21 PROCEDURE — 999N000141 HC STATISTIC PRE-PROCEDURE NURSING ASSESSMENT: Performed by: STUDENT IN AN ORGANIZED HEALTH CARE EDUCATION/TRAINING PROGRAM

## 2021-01-21 PROCEDURE — 250N000013 HC RX MED GY IP 250 OP 250 PS 637: Performed by: ANESTHESIOLOGY

## 2021-01-21 PROCEDURE — 88300 SURGICAL PATH GROSS: CPT | Mod: 26 | Performed by: PATHOLOGY

## 2021-01-21 PROCEDURE — 88300 SURGICAL PATH GROSS: CPT | Mod: TC | Performed by: STUDENT IN AN ORGANIZED HEALTH CARE EDUCATION/TRAINING PROGRAM

## 2021-01-21 PROCEDURE — 93010 ELECTROCARDIOGRAM REPORT: CPT | Performed by: INTERNAL MEDICINE

## 2021-01-21 PROCEDURE — 370N000017 HC ANESTHESIA TECHNICAL FEE, PER MIN: Performed by: STUDENT IN AN ORGANIZED HEALTH CARE EDUCATION/TRAINING PROGRAM

## 2021-01-21 PROCEDURE — 710N000009 HC RECOVERY PHASE 1, LEVEL 1, PER MIN: Performed by: STUDENT IN AN ORGANIZED HEALTH CARE EDUCATION/TRAINING PROGRAM

## 2021-01-21 PROCEDURE — C1769 GUIDE WIRE: HCPCS | Performed by: STUDENT IN AN ORGANIZED HEALTH CARE EDUCATION/TRAINING PROGRAM

## 2021-01-21 PROCEDURE — 710N000012 HC RECOVERY PHASE 2, PER MINUTE: Performed by: STUDENT IN AN ORGANIZED HEALTH CARE EDUCATION/TRAINING PROGRAM

## 2021-01-21 PROCEDURE — 272N000001 HC OR GENERAL SUPPLY STERILE: Performed by: STUDENT IN AN ORGANIZED HEALTH CARE EDUCATION/TRAINING PROGRAM

## 2021-01-21 PROCEDURE — 360N000084 HC SURGERY LEVEL 4 W/ FLUORO, PER MIN: Performed by: STUDENT IN AN ORGANIZED HEALTH CARE EDUCATION/TRAINING PROGRAM

## 2021-01-21 PROCEDURE — 255N000002 HC RX 255 OP 636: Performed by: STUDENT IN AN ORGANIZED HEALTH CARE EDUCATION/TRAINING PROGRAM

## 2021-01-21 PROCEDURE — 999N000179 XR SURGERY CARM FLUORO LESS THAN 5 MIN W STILLS: Mod: TC

## 2021-01-21 PROCEDURE — 250N000009 HC RX 250: Performed by: ANESTHESIOLOGY

## 2021-01-21 PROCEDURE — C2617 STENT, NON-COR, TEM W/O DEL: HCPCS | Performed by: STUDENT IN AN ORGANIZED HEALTH CARE EDUCATION/TRAINING PROGRAM

## 2021-01-21 PROCEDURE — 82365 CALCULUS SPECTROSCOPY: CPT | Performed by: STUDENT IN AN ORGANIZED HEALTH CARE EDUCATION/TRAINING PROGRAM

## 2021-01-21 PROCEDURE — 250N000011 HC RX IP 250 OP 636: Performed by: NURSE ANESTHETIST, CERTIFIED REGISTERED

## 2021-01-21 PROCEDURE — 258N000003 HC RX IP 258 OP 636: Performed by: ANESTHESIOLOGY

## 2021-01-21 PROCEDURE — 80048 BASIC METABOLIC PNL TOTAL CA: CPT | Performed by: STUDENT IN AN ORGANIZED HEALTH CARE EDUCATION/TRAINING PROGRAM

## 2021-01-21 PROCEDURE — 999N001017 HC STATISTIC GLUCOSE BY METER IP

## 2021-01-21 DEVICE — STENT URETERAL POLARIS ULTRA 6FRX26CM M0061921330
Type: IMPLANTABLE DEVICE | Site: ABDOMEN | Status: NON-FUNCTIONAL
Removed: 2021-06-03

## 2021-01-21 RX ORDER — OXYCODONE HYDROCHLORIDE 5 MG/1
5 TABLET ORAL EVERY 6 HOURS PRN
Qty: 8 TABLET | Refills: 0 | Status: SHIPPED | OUTPATIENT
Start: 2021-01-21 | End: 2021-01-24

## 2021-01-21 RX ORDER — ALBUTEROL SULFATE 0.83 MG/ML
2.5 SOLUTION RESPIRATORY (INHALATION) EVERY 4 HOURS PRN
Status: DISCONTINUED | OUTPATIENT
Start: 2021-01-21 | End: 2021-01-21 | Stop reason: HOSPADM

## 2021-01-21 RX ORDER — PHENYLEPHRINE HYDROCHLORIDE 10 MG/ML
INJECTION INTRAVENOUS PRN
Status: DISCONTINUED | OUTPATIENT
Start: 2021-01-21 | End: 2021-01-21

## 2021-01-21 RX ORDER — ONDANSETRON 4 MG/1
4 TABLET, ORALLY DISINTEGRATING ORAL EVERY 30 MIN PRN
Status: DISCONTINUED | OUTPATIENT
Start: 2021-01-21 | End: 2021-01-21 | Stop reason: HOSPADM

## 2021-01-21 RX ORDER — FENTANYL CITRATE 50 UG/ML
25-50 INJECTION, SOLUTION INTRAMUSCULAR; INTRAVENOUS
Status: DISCONTINUED | OUTPATIENT
Start: 2021-01-21 | End: 2021-01-21 | Stop reason: HOSPADM

## 2021-01-21 RX ORDER — ACETAMINOPHEN 500 MG
1000 TABLET ORAL EVERY 6 HOURS PRN
Qty: 100 TABLET | Refills: 0 | Status: ON HOLD | OUTPATIENT
Start: 2021-01-21 | End: 2021-06-03

## 2021-01-21 RX ORDER — DEXAMETHASONE SODIUM PHOSPHATE 4 MG/ML
INJECTION, SOLUTION INTRA-ARTICULAR; INTRALESIONAL; INTRAMUSCULAR; INTRAVENOUS; SOFT TISSUE PRN
Status: DISCONTINUED | OUTPATIENT
Start: 2021-01-21 | End: 2021-01-21

## 2021-01-21 RX ORDER — DOCUSATE SODIUM 100 MG/1
100 CAPSULE, LIQUID FILLED ORAL 2 TIMES DAILY
Qty: 30 CAPSULE | Refills: 0 | Status: ON HOLD | OUTPATIENT
Start: 2021-01-21 | End: 2021-05-21

## 2021-01-21 RX ORDER — NALOXONE HYDROCHLORIDE 0.4 MG/ML
0.2 INJECTION, SOLUTION INTRAMUSCULAR; INTRAVENOUS; SUBCUTANEOUS
Status: DISCONTINUED | OUTPATIENT
Start: 2021-01-21 | End: 2021-01-21 | Stop reason: HOSPADM

## 2021-01-21 RX ORDER — FENTANYL CITRATE 50 UG/ML
INJECTION, SOLUTION INTRAMUSCULAR; INTRAVENOUS PRN
Status: DISCONTINUED | OUTPATIENT
Start: 2021-01-21 | End: 2021-01-21

## 2021-01-21 RX ORDER — ONDANSETRON 2 MG/ML
4 INJECTION INTRAMUSCULAR; INTRAVENOUS EVERY 30 MIN PRN
Status: DISCONTINUED | OUTPATIENT
Start: 2021-01-21 | End: 2021-01-21 | Stop reason: HOSPADM

## 2021-01-21 RX ORDER — CEFAZOLIN SODIUM 1 G/3ML
1 INJECTION, POWDER, FOR SOLUTION INTRAMUSCULAR; INTRAVENOUS SEE ADMIN INSTRUCTIONS
Status: DISCONTINUED | OUTPATIENT
Start: 2021-01-21 | End: 2021-01-21 | Stop reason: HOSPADM

## 2021-01-21 RX ORDER — LABETALOL 20 MG/4 ML (5 MG/ML) INTRAVENOUS SYRINGE
10
Status: DISCONTINUED | OUTPATIENT
Start: 2021-01-21 | End: 2021-01-21 | Stop reason: HOSPADM

## 2021-01-21 RX ORDER — MEPERIDINE HYDROCHLORIDE 25 MG/ML
12.5 INJECTION INTRAMUSCULAR; INTRAVENOUS; SUBCUTANEOUS
Status: DISCONTINUED | OUTPATIENT
Start: 2021-01-21 | End: 2021-01-21 | Stop reason: HOSPADM

## 2021-01-21 RX ORDER — HYDROMORPHONE HYDROCHLORIDE 1 MG/ML
.3-.5 INJECTION, SOLUTION INTRAMUSCULAR; INTRAVENOUS; SUBCUTANEOUS EVERY 10 MIN PRN
Status: DISCONTINUED | OUTPATIENT
Start: 2021-01-21 | End: 2021-01-21 | Stop reason: HOSPADM

## 2021-01-21 RX ORDER — NALOXONE HYDROCHLORIDE 0.4 MG/ML
0.4 INJECTION, SOLUTION INTRAMUSCULAR; INTRAVENOUS; SUBCUTANEOUS
Status: DISCONTINUED | OUTPATIENT
Start: 2021-01-21 | End: 2021-01-21 | Stop reason: HOSPADM

## 2021-01-21 RX ORDER — IBUPROFEN 800 MG/1
800 TABLET, FILM COATED ORAL EVERY 6 HOURS PRN
Qty: 50 TABLET | Refills: 0 | Status: ON HOLD | OUTPATIENT
Start: 2021-01-21 | End: 2021-05-22

## 2021-01-21 RX ORDER — CIPROFLOXACIN 500 MG/1
500 TABLET, FILM COATED ORAL ONCE
Qty: 1 TABLET | Refills: 0 | Status: SHIPPED | OUTPATIENT
Start: 2021-01-21 | End: 2021-01-21

## 2021-01-21 RX ORDER — ACETAMINOPHEN 325 MG/1
975 TABLET ORAL ONCE
Status: COMPLETED | OUTPATIENT
Start: 2021-01-21 | End: 2021-01-21

## 2021-01-21 RX ORDER — LIDOCAINE 40 MG/G
CREAM TOPICAL
Status: DISCONTINUED | OUTPATIENT
Start: 2021-01-21 | End: 2021-01-21 | Stop reason: HOSPADM

## 2021-01-21 RX ORDER — PROPOFOL 10 MG/ML
INJECTION, EMULSION INTRAVENOUS PRN
Status: DISCONTINUED | OUTPATIENT
Start: 2021-01-21 | End: 2021-01-21

## 2021-01-21 RX ORDER — SODIUM CHLORIDE, SODIUM LACTATE, POTASSIUM CHLORIDE, CALCIUM CHLORIDE 600; 310; 30; 20 MG/100ML; MG/100ML; MG/100ML; MG/100ML
INJECTION, SOLUTION INTRAVENOUS CONTINUOUS
Status: DISCONTINUED | OUTPATIENT
Start: 2021-01-21 | End: 2021-01-21 | Stop reason: HOSPADM

## 2021-01-21 RX ORDER — HYDRALAZINE HYDROCHLORIDE 20 MG/ML
2.5-5 INJECTION INTRAMUSCULAR; INTRAVENOUS EVERY 10 MIN PRN
Status: DISCONTINUED | OUTPATIENT
Start: 2021-01-21 | End: 2021-01-21 | Stop reason: HOSPADM

## 2021-01-21 RX ORDER — ONDANSETRON 2 MG/ML
INJECTION INTRAMUSCULAR; INTRAVENOUS PRN
Status: DISCONTINUED | OUTPATIENT
Start: 2021-01-21 | End: 2021-01-21

## 2021-01-21 RX ORDER — TAMSULOSIN HYDROCHLORIDE 0.4 MG/1
0.4 CAPSULE ORAL DAILY
Qty: 14 CAPSULE | Refills: 0 | Status: SHIPPED | OUTPATIENT
Start: 2021-01-21 | End: 2021-05-16

## 2021-01-21 RX ORDER — TAMSULOSIN HYDROCHLORIDE 0.4 MG/1
0.4 CAPSULE ORAL ONCE
Status: COMPLETED | OUTPATIENT
Start: 2021-01-21 | End: 2021-01-21

## 2021-01-21 RX ORDER — ATROPA BELLADONNA AND OPIUM 16.2; 3 MG/1; MG/1
SUPPOSITORY RECTAL PRN
Status: DISCONTINUED | OUTPATIENT
Start: 2021-01-21 | End: 2021-01-21 | Stop reason: HOSPADM

## 2021-01-21 RX ORDER — CEFAZOLIN SODIUM 2 G/100ML
2 INJECTION, SOLUTION INTRAVENOUS
Status: COMPLETED | OUTPATIENT
Start: 2021-01-21 | End: 2021-01-21

## 2021-01-21 RX ADMIN — DEXAMETHASONE SODIUM PHOSPHATE 4 MG: 4 INJECTION, SOLUTION INTRA-ARTICULAR; INTRALESIONAL; INTRAMUSCULAR; INTRAVENOUS; SOFT TISSUE at 09:10

## 2021-01-21 RX ADMIN — FENTANYL CITRATE 50 MCG: 50 INJECTION, SOLUTION INTRAMUSCULAR; INTRAVENOUS at 10:07

## 2021-01-21 RX ADMIN — PHENYLEPHRINE HYDROCHLORIDE 200 MCG: 10 INJECTION INTRAVENOUS at 09:36

## 2021-01-21 RX ADMIN — FENTANYL CITRATE 100 MCG: 50 INJECTION, SOLUTION INTRAMUSCULAR; INTRAVENOUS at 09:10

## 2021-01-21 RX ADMIN — PHENYLEPHRINE HYDROCHLORIDE 200 MCG: 10 INJECTION INTRAVENOUS at 09:19

## 2021-01-21 RX ADMIN — PROPOFOL 200 MG: 10 INJECTION, EMULSION INTRAVENOUS at 09:10

## 2021-01-21 RX ADMIN — ONDANSETRON HYDROCHLORIDE 4 MG: 2 INJECTION, SOLUTION INTRAVENOUS at 09:10

## 2021-01-21 RX ADMIN — MIDAZOLAM 2 MG: 1 INJECTION INTRAMUSCULAR; INTRAVENOUS at 09:05

## 2021-01-21 RX ADMIN — SODIUM CHLORIDE, POTASSIUM CHLORIDE, SODIUM LACTATE AND CALCIUM CHLORIDE: 600; 310; 30; 20 INJECTION, SOLUTION INTRAVENOUS at 09:51

## 2021-01-21 RX ADMIN — LIDOCAINE HYDROCHLORIDE 50 MG: 10 INJECTION, SOLUTION EPIDURAL; INFILTRATION; INTRACAUDAL; PERINEURAL at 09:10

## 2021-01-21 RX ADMIN — PHENYLEPHRINE HYDROCHLORIDE 100 MCG: 10 INJECTION INTRAVENOUS at 09:10

## 2021-01-21 RX ADMIN — MIDAZOLAM 2 MG: 1 INJECTION INTRAMUSCULAR; INTRAVENOUS at 09:08

## 2021-01-21 RX ADMIN — ACETAMINOPHEN 975 MG: 325 TABLET, FILM COATED ORAL at 08:55

## 2021-01-21 RX ADMIN — FENTANYL CITRATE 50 MCG: 50 INJECTION, SOLUTION INTRAMUSCULAR; INTRAVENOUS at 09:45

## 2021-01-21 RX ADMIN — SODIUM CHLORIDE, POTASSIUM CHLORIDE, SODIUM LACTATE AND CALCIUM CHLORIDE: 600; 310; 30; 20 INJECTION, SOLUTION INTRAVENOUS at 09:03

## 2021-01-21 RX ADMIN — PHENYLEPHRINE HYDROCHLORIDE 100 MCG: 10 INJECTION INTRAVENOUS at 09:25

## 2021-01-21 RX ADMIN — TAMSULOSIN HYDROCHLORIDE 0.4 MG: 0.4 CAPSULE ORAL at 08:58

## 2021-01-21 RX ADMIN — PHENYLEPHRINE HYDROCHLORIDE 100 MCG: 10 INJECTION INTRAVENOUS at 09:49

## 2021-01-21 RX ADMIN — PHENYLEPHRINE HYDROCHLORIDE 200 MCG: 10 INJECTION INTRAVENOUS at 09:56

## 2021-01-21 RX ADMIN — CEFAZOLIN SODIUM 2 G: 2 INJECTION, SOLUTION INTRAVENOUS at 09:15

## 2021-01-21 RX ADMIN — PHENYLEPHRINE HYDROCHLORIDE 200 MCG: 10 INJECTION INTRAVENOUS at 09:55

## 2021-01-21 RX ADMIN — PHENYLEPHRINE HYDROCHLORIDE 200 MCG: 10 INJECTION INTRAVENOUS at 09:42

## 2021-01-21 ASSESSMENT — MIFFLIN-ST. JEOR: SCORE: 1908.13

## 2021-01-21 NOTE — ANESTHESIA PROCEDURE NOTES
Airway   Date/Time: 1/21/2021 9:11 AM   Patient location during procedure: OR  Staff -   Anesthesiologist:  Dmitriy Anne MD  CRNA: Johana Ruiz APRN CRNA  Performed By: CRNA    Consent for Airway   Urgency: elective    Indications and Patient Condition  Indications for airway management: ivy-procedural  Induction type:intravenousMask difficulty assessment: 1 - vent by mask    Final Airway Details  Final airway type: supraglottic airway    Endotracheal Airway Details   Secured with: plastic tape    Post intubation assessment   Placement verified by: capnometry, equal breath sounds and chest rise   Number of attempts at approach: 1  Number of other approaches attempted: 0  Secured with:plastic tape  Ease of procedure: easy  Dentition: Intact and Unchanged         not applicable (Male)

## 2021-01-21 NOTE — OR NURSING
Instructions reviewed with pt and wife at this time.  All questions answered.  All discharge items returned and reviewed at this time.  Pt to go home with spouse upon arrival   Orthopaedics Daily Progress Note                            Date of Surgery:  1/15/2020      Patient: Keena Rolon   YOB: 1956  Age: 66 y.o. SUBJECTIVE:   2 Days Post-Op following REVISION OF LEFT TOTAL KNEE ARTHROPLASTY (SPINAL WITH IV SEDATION/BLOCK). The patient's post operative pain is an issue, IV morphine overnight. No CP/SOB. No N/V. The patient's mobility will be evaluated today during PT sessions. OBJECTIVE:     Vital Signs:      Visit Vitals  /75 (BP 1 Location: Left arm, BP Patient Position: At rest)   Pulse 83   Temp 97.6 °F (36.4 °C)   Resp 15   Ht 5' 7.5\" (1.715 m)   Wt 110.5 kg (243 lb 8 oz)   LMP 11/25/2012   SpO2 95%   BMI 37.57 kg/m²       Physical Exam:  General: A&Ox3. The patient is cooperative, and in no acute distress. Respiratory: Respirations are unlabored. Surgical site(s): dressing clean, dry  Musculoskeletal: Calves are soft, supple, and non-tender upon palpation. Motor 5/5. Neurological:  Neurovascularly intact with good dorsi and plantar flexion. Pulses symmetrical.    Laboratory Values:             No results found for this or any previous visit (from the past 12 hour(s)). PLAN:     S/P REVISION OF LEFT TOTAL KNEE ARTHROPLASTY (SPINAL WITH IV SEDATION/BLOCK) -Continue WBAT. -Mobilize and continue with PT/OT until discharged     Hemodynamics  Acute blood loss anemia as expected. Patient asymptomatic. Continue to monitor. Wound Monitor postop dressing; no postop dressing changes necessary. Reinforce PRN. Post Operative Pain Pain Control: stable, mild-to-moderate joint symptoms intermittently, reasonably well controlled by current meds. DVT Prophylaxis Continue with SCD'S, Ankle Pump Exercises. ASA 81mg BID     Discharge Disposition Discharge plan: Home with HHPT pending PT clearance and pain control.        Signed By: Shena Cavazos PA-C  January 17, 2020 6:30 AM

## 2021-01-21 NOTE — DISCHARGE INSTRUCTIONS
POSTOPERATIVE INSTRUCTIONS    Diagnosis-------------------------------   Right distal ureteral stone    Procedure-------------------------------  Procedure(s) (LRB):  Cystoscopy with right ureteroscopy/laser lithotripsy/stone basketing, right ureteral stent placement (Right)      Findings--------------------------------  Two distal ureteral stones, 8 mm and 3 mm, laser fragmented and basketed  Upstream moderate hydroureteronephrosis with multiple annular wide caliber ureteral strictures visible in the distal ureter    Home-going instructions-----------------         Activity Limitation:     - No driving or operating heavy machinery while on narcotic pain medication.     FOLLOW THESE INSTRUCTIONS AS INDICATED BELOW:  - Observe operative area for signs of excessive bleeding.  - You may shower.  - Increase fluid intake to promote clear urine.  - Resume usual diet as tolerated    What to expect while recovering-----------  - You may experience some intermittent bleeding that makes your urine pink or cherry colored. This is normal.  - However, if you are unable to urinate, passing large amount of clots, have ayan blood in your urine, or have a temperature >101 degrees, call the urology nurse on call, or present to your nearest emergency department.  - You are encouraged to walk daily, and have no activity restrictions.   - A URETERAL STENT has been placed that allows urine to flow unobstructed from your kidney into your bladder.  The stent has a curl in the kidney and a curl in the bladder.  The curl in the bladder can cause some urgency and frequency of urination as well as some mild blood in the urine.  The curl in the kidney can cause some mild flank discomfort.  This may be more noticeable when you urinate.  A URETERAL STENT is meant to be left in temporarily.  It must be removed or changed no later than 3 months after its insertion.  If it's not removed it can result in stone overgrowth on the stent that can cause  pain, infection, and can be very difficult to remove.      Discharge Medications/instructions:     - Flomax (tamsulosin) to be taken daily until stent is removed    - Antibiotics (ciprofloxacin) are to be taken with you to your scheduled return visit for stent removal    - Take Tylenol 1000mg every 6 hours for pain    - Take Ibuprofen 600mg every 6 hours as needed for additional pain control (alternate with the Tylenol so you take one or the other every 3 hours)    - Take Oxycodone 5mg every 6 hours only for break through pain    - Take Colace while taking Oxycodone to prevent constipation      Questions/concerns------------------------  [facility]: [phone]    Future appointments  Follow up 1-2 weeks for stent removal      Hudson Montana MD         You had 975mg of tylenol at 8:55am.  Do not take tylenol until after 2:55pm if needed.  Maximum acetaminophen (Tylenol) dose from all sources should not exceed 4 grams (4000 mg) per day.        GENERAL ANESTHESIA OR SEDATION ADULT DISCHARGE INSTRUCTIONS   SPECIAL PRECAUTIONS FOR 24 HOURS AFTER SURGERY    IT IS NOT UNUSUAL TO FEEL LIGHT-HEADED OR FAINT, UP TO 24 HOURS AFTER SURGERY OR WHILE TAKING PAIN MEDICATION.  IF YOU HAVE THESE SYMPTOMS; SIT FOR A FEW MINUTES BEFORE STANDING AND HAVE SOMEONE ASSIST YOU WHEN YOU GET UP TO WALK OR USE THE BATHROOM.    YOU SHOULD REST AND RELAX FOR THE NEXT 24 HOURS AND YOU MUST MAKE ARRANGEMENTS TO HAVE SOMEONE STAY WITH YOU FOR AT LEAST 24 HOURS AFTER YOUR DISCHARGE.  AVOID HAZARDOUS AND STRENUOUS ACTIVITIES.  DO NOT MAKE IMPORTANT DECISIONS FOR 24 HOURS.    DO NOT DRIVE ANY VEHICLE OR OPERATE MECHANICAL EQUIPMENT FOR 24 HOURS FOLLOWING THE END OF YOUR SURGERY.  EVEN THOUGH YOU MAY FEEL NORMAL, YOUR REACTIONS MAY BE AFFECTED BY THE MEDICATION YOU HAVE RECEIVED.    DO NOT DRINK ALCOHOLIC BEVERAGES FOR 24 HOURS FOLLOWING YOUR SURGERY.    DRINK CLEAR LIQUIDS (APPLE JUICE, GINGER ALE, 7-UP, BROTH, ETC.).   PROGRESS TO YOUR REGULAR DIET AS YOU FEEL ABLE.    YOU MAY HAVE A DRY MOUTH, A SORE THROAT, MUSCLES ACHES OR TROUBLE SLEEPING.  THESE SHOULD GO AWAY AFTER 24 HOURS.    CALL YOUR DOCTOR FOR ANY OF THE FOLLOWING:  SIGNS OF INFECTION (FEVER, GROWING TENDERNESS AT THE SURGERY SITE, A LARGE AMOUNT OF DRAINAGE OR BLEEDING, SEVERE PAIN, FOUL-SMELLING DRAINAGE, REDNESS OR SWELLING.    IT HAS BEEN OVER 8 TO 10 HOURS SINCE SURGERY AND YOU ARE STILL NOT ABLE TO URINATE (PASS WATER).

## 2021-01-21 NOTE — ANESTHESIA PREPROCEDURE EVALUATION
Anesthesia Pre-Procedure Evaluation    Patient: Ashutosh Moraes   MRN: [id number] : 1965        Preoperative Diagnosis: Ureteral stone [N20.1]   Procedure : Procedure(s):  Cystoscopy with right ureteroscopy/laser lithotripsy/stone basketing, right ureteral stent placement     Past Medical History:   Diagnosis Date     CAD (coronary artery disease)     Cardiac cath 2009: RAMANA to OM     Diabetes (H)     Type 2     Gastro-oesophageal reflux disease      HLD (hyperlipidemia)      Hypertension      Kidney stones      Sleep apnea     Doesn't use a CPAP     Stented coronary artery       Past Surgical History:   Procedure Laterality Date     APPENDECTOMY       COLONOSCOPY       HEART CATH LEFT HEART CATH  2009    RAMANA to OM      Allergies   Allergen Reactions     Atorvastatin Rash      Social History     Tobacco Use     Smoking status: Never Smoker     Smokeless tobacco: Never Used   Substance Use Topics     Alcohol use: No     Alcohol/week: 0.0 standard drinks      Wt Readings from Last 1 Encounters:   21 108.9 kg (240 lb)        Anesthesia Evaluation   Pt has had prior anesthetic. Type: General.    No history of anesthetic complications       ROS/MED HX  ENT/Pulmonary:     (+) sleep apnea, doesn't use CPAP,     Neurologic:  - neg neurologic ROS     Cardiovascular:     (+) hypertension--CAD --stent-. 1 Drug Eluting Stent. Previous cardiac testing   Echo: Date:  Results:  The patient exhibited no chest pain during exercise.  The Duke treadmill score was low risk ( >5 Duke score).  A treadmill exercise test according to the Gustavo protocol was performed.  Target Heart Rate was achieved.  Arrhythmia induced during stress: 3-beat run of ventricular tachycardia.  frequent PVC's, Couplets.  This was a normal stress echocardiogram with no evidence of stress-induced  ischemia.  The visual ejection fraction is estimated at >70%.  Normal resting wall motion and no stress-induced wall motion  abnormality  Stress Test: Date: Results:    ECG Reviewed: Date: Results:    Cath: Date: Results:      METS/Exercise Tolerance:     Hematologic:  - neg hematologic  ROS     Musculoskeletal:  - neg musculoskeletal ROS     GI/Hepatic:     (+) GERD, Asymptomatic on medication,     Renal/Genitourinary:     (+) Nephrolithiasis ,     Endo:     (+) type II DM, Obesity,     Psychiatric/Substance Use:  - neg psychiatric ROS     Infectious Disease:  - neg infectious disease ROS     Malignancy:       Other:            Physical Exam    Airway        Mallampati: II   TM distance: > 3 FB   Neck ROM: full   Mouth opening: > 3 cm    Respiratory Devices and Support         Dental  no notable dental history         Cardiovascular   cardiovascular exam normal          Pulmonary   pulmonary exam normal                OUTSIDE LABS:  CBC:   Lab Results   Component Value Date    WBC 12.5 (H) 11/14/2020    WBC 9.5 06/21/2020    HGB 14.0 11/14/2020    HGB 12.6 (L) 06/21/2020    HCT 43.9 11/14/2020    HCT 38.5 (L) 06/21/2020     11/14/2020     06/21/2020     BMP:   Lab Results   Component Value Date     11/14/2020     06/21/2020    POTASSIUM 3.9 11/14/2020    POTASSIUM 3.6 06/21/2020    CHLORIDE 104 11/14/2020    CHLORIDE 102 06/21/2020    CO2 29 11/14/2020    CO2 28 06/21/2020    BUN 19 11/14/2020    BUN 22 06/21/2020    CR 1.62 (H) 11/14/2020    CR 1.65 (H) 06/21/2020     (H) 11/14/2020     (H) 06/21/2020     COAGS:   Lab Results   Component Value Date    PTT 42 (H) 07/02/2009    INR 1.01 07/07/2009     POC:   Lab Results   Component Value Date     (H) 01/21/2021     HEPATIC:   Lab Results   Component Value Date    ALBUMIN 4.0 07/08/2009    PROTTOTAL 7.0 07/08/2009    ALT <5 (L) 05/11/2018    AST 68 (H) 07/08/2009    ALKPHOS 76 07/08/2009    BILITOTAL 0.3 07/08/2009     OTHER:   Lab Results   Component Value Date    A1C 7.3 (H) 06/21/2020    KATIANA 8.8 11/14/2020    CRP 10.1 (H) 03/02/2014    SED  6 03/02/2014       Anesthesia Plan     History & Physical Review      ASA Status:  3. NPO Status:  NPO Appropriate. .  Plan for General with Intravenous induction. Device: LMA Maintenance will be Inhalation.         PONV prophylaxis:  Ondansetron (or other 5HT-3) and Dexamethasone or Solumedrol.       Consents  Anesthesia Plan(s) and associated risks, benefits, and realistic alternatives discussed.    Questions answered and patient/representative(s) expressed understanding.    Discussed with:  Patient.       Extended Intubation/Ventilatory Support Discussed No No     Use of blood products discussed: No.          Postoperative Care  Postoperative pain management: IV analgesics.           Dmitriy Anne MD

## 2021-01-21 NOTE — ANESTHESIA POSTPROCEDURE EVALUATION
Patient: Ashutosh Moraes    Procedure(s):  Cystoscopy with right ureteroscopy/laser lithotripsy/stone basketing, right ureteral stent placement    Diagnosis:Ureteral stone [N20.1]  Diagnosis Additional Information: No value filed.    Anesthesia Type:  General    Note:  Disposition: Outpatient   Postop Pain Control: Uneventful            Sign Out: Well controlled pain   PONV: No   Neuro/Psych: Uneventful            Sign Out: Acceptable/Baseline neuro status   Airway/Respiratory: Uneventful            Sign Out: Acceptable/Baseline resp. status   CV/Hemodynamics: Uneventful            Sign Out: Acceptable CV status   Other NRE: NONE   DID A NON-ROUTINE EVENT OCCUR? No         Last vitals:  Vitals:    01/21/21 1045 01/21/21 1138 01/21/21 1200   BP: 124/88 132/85 134/86   Pulse: 82 79 76   Resp: 11 14 18   Temp:  96.7  F (35.9  C) 97  F (36.1  C)   SpO2: 94% 99%        Electronically Signed By: Dmitriy Anne MD  January 21, 2021  4:35 PM

## 2021-01-21 NOTE — ANESTHESIA CARE TRANSFER NOTE
Patient: Ashutosh Moraes    Procedure(s):  Cystoscopy with right ureteroscopy/laser lithotripsy/stone basketing, right ureteral stent placement    Diagnosis: Ureteral stone [N20.1]  Diagnosis Additional Information: No value filed.    Anesthesia Type:   General     Note:    Oropharynx: oral airway in place  Level of Consciousness: drowsy  Oxygen Supplementation: face mask  Level of Supplemental Oxygen: 4L  Independent Airway: airway patency satisfactory and stable  Dentition: dentition unchanged  Vital Signs Stable: post-procedure vital signs reviewed and stable  Report to RN Given: handoff report given  Patient transferred to: PACU    Handoff Report: Identifed the Patient, Identified the Reponsible Provider, Reviewed the pertinent medical history, Discussed the surgical course, Reviewed Intra-OP anesthesia mangement and issues during anesthesia, Set expectations for post-procedure period and Allowed opportunity for questions and acknowledgement of understanding      Vitals: (Last set prior to Anesthesia Care Transfer)  CRNA VITALS  1/21/2021 0933 - 1/21/2021 1008      1/21/2021             Pulse:  88    SpO2:  97 %    Resp Rate (observed):  8        Electronically Signed By: RIN Boudreaux CRNA  January 21, 2021  10:08 AM

## 2021-01-21 NOTE — OP NOTE
[facility]  Operative Note    Pre-operative diagnosis: Ureteral stone [N20.1]   Post-operative diagnosis Right ureteral stone, right ureteral strictures   Procedure: Procedure(s):  Cystoscopy  Right ureteroscopy with laser lithotripsy  Right ureteroscopy with stone basketing  Right retrograde pyelogram  Right ureteral stent placement  Fluoroscopic interpretation <1 hour physician time   Surgeon: Hudson Montana MD   Assistants(s): none   Anesthesia: General    Estimated blood loss: Minimal    Total IV fluids: (See anesthesia record)   Blood transfusion: No transfusion was given during surgery   Total urine output: Not measured   Drains: None   Specimens: ID Type Source Tests Collected by Time Destination   1 : right ureteral stones Calculus/Stone Ureter, Right STONE ANALYSIS Hudson Montana MD 1/21/2021  9:49 AM         Implants: Implant Name Type Inv. Item Serial No.  Lot No. LRB No. Used Action   STENT URETERAL POLARIS ULTRA [id number] Stent STENT URETERAL POLARIS ULTRA [id number]  [employer] [id number] Right 1 Implanted      Findings: Two distal ureteral stones, 8 mm and 3 mm, laser fragmented and basketed  Upstream moderate hydroureteronephrosis with multiple annular wide caliber ureteral strictures visible in the distal ureter     Complications: None.   Condition: Stable       Description of procedure:  55-year-old man with a long history of nephrolithiasis and multiple prior stone episodes found to have obstructing distal ureteral stone.  Risk and benefits were discussed and informed consent was obtained for treatment of the stone with right ureteroscopy and laser lithotripsy with stent placement.    The patient was brought to OR #14.  A weight and renal appropriate dose of Ancef antibiotics was given prior to procedure.  General anesthesia was induced, he was placed in dorsal lithotomy position, prepped and draped in standard sterile fashion.   "A timeout was performed.    A 22 Tunisian Storz cystoscope was assembled and passed through the urethra and into the bladder.  Prostatic urethra was relatively short with mild bilobar hypertrophy with a slightly elevated bladder neck.  The bladder was entered and was seen to be mildly trabeculated with no cellules and no diverticuli.  There were no concerning bladder urothelial lesions and no bladder stones.  The right ureteral orifice was identified.  This looked to be quite tight appearing and inflamed presumably from the presence of the distal ureteral stone and prior spontaneously passed stones.  The UO was cannulated with a 0.035 inch stiff shaft Glidewire with the assistance of a 5 Tunisian tiger tail catheter.  The wire was passed up to the renal pelvis under fluoroscopic guidance.  The  film showed radiodensities in the distal ureter alongside the wire corresponding to the known ureteral stone.  A second 0.035 inch stiff shaft Glidewire was also placed up the ureteral orifice using the assistance of the tiger tail. Both wires were clipped to the drapes.  The cystoscope was removed and a long semirigid ureteroscope was then passed into the bladder and up the right ureter between the 2 wires in a \"railroad\" fashion.  This gently dilated the ureteral orifice.  A large distal ureteral stone about 8 mm in diameter was seen impacted about 2 cm proximal to the UO.  A 365  m laser fiber was used along with the Lumenis holmium laser at settings of 0.8 J / 8 Hz to fragment the stone into multiple pieces.  There was a smaller 3 mm stone behind this larger stone which was also fragmented.  The fragments were basketed out using a [employer] halo basket and left temporarily in the bladder.  The scope was passed up the distal ureter to the mid ureter and no further stone fragments were seen.  There were multiple wide caliber annular ureteral strictures upstream to where the stone has been but the ureter appeared widely " patent.  Retrograde pyelogram confirmed moderate hydroureteronephrosis all the way to the renal pelvis.  The semirigid ureteroscope was removed.  A stent was then placed in the usual fashion under cystoscopic and fluoroscopic guidance with good coils noted proximally and distally.  The stone fragments were then irrigated out of the bladder.  The bladder was drained the scope was removed.  A belladonna and opium suppository was placed per rectum.  The patient was cleaned up, awoken from anesthesia and brought to PACU in stable condition.  He will return in 1 to 2 weeks for cystoscopy and stent removal in the office.    Hudson Montana MD   [facility]  [phone] clinic phone

## 2021-01-22 LAB — INTERPRETATION ECG - MUSE: NORMAL

## 2021-01-26 LAB
APPEARANCE STONE: NORMAL
COMPN STONE: NORMAL
NUMBER STONE: NORMAL
SIZE STONE: NORMAL MM
WT STONE: 93 MG

## 2021-02-01 ENCOUNTER — OFFICE VISIT (OUTPATIENT)
Dept: UROLOGY | Facility: CLINIC | Age: 56
End: 2021-02-01
Payer: COMMERCIAL

## 2021-02-01 VITALS
DIASTOLIC BLOOD PRESSURE: 80 MMHG | WEIGHT: 242 LBS | SYSTOLIC BLOOD PRESSURE: 110 MMHG | HEIGHT: 68 IN | HEART RATE: 106 BPM | OXYGEN SATURATION: 98 % | BODY MASS INDEX: 36.68 KG/M2

## 2021-02-01 DIAGNOSIS — N20.1 RIGHT URETERAL STONE: Primary | ICD-10-CM

## 2021-02-01 PROCEDURE — 52310 CYSTOSCOPY AND TREATMENT: CPT | Performed by: STUDENT IN AN ORGANIZED HEALTH CARE EDUCATION/TRAINING PROGRAM

## 2021-02-01 RX ORDER — LIDOCAINE HYDROCHLORIDE 20 MG/ML
JELLY TOPICAL ONCE
Status: COMPLETED | OUTPATIENT
Start: 2021-02-01 | End: 2021-02-01

## 2021-02-01 RX ADMIN — LIDOCAINE HYDROCHLORIDE 5 ML: 20 JELLY TOPICAL at 13:25

## 2021-02-01 ASSESSMENT — PAIN SCALES - GENERAL: PAINLEVEL: NO PAIN (0)

## 2021-02-01 ASSESSMENT — MIFFLIN-ST. JEOR: SCORE: 1917.2

## 2021-02-01 NOTE — PROGRESS NOTES
PRE-PROCEDURE DIAGNOSIS: right ureteral stone    POST-PROCEDURE DIAGNOSIS: right ureteral stone    PROCEDURE: Cystoscopy with right ureteral stent removal    HISTORY: 55-year-old man with a long history of nephrolithiasis and multiple prior stone episodes found to have obstructing right distal ureteral stone s/p right ureteroscopy and laser lithotripsy 1/21/2021 here for stent removal    REVIEW OF OFFICE STUDIES (e.g., uroflow or PVR): stone analysis  Orange fragments composed primarily of   uric acid.   Brown fragments composed primarily of   calcium oxalate monohydrate.     DESCRIPTION OF PROCEDURE: After informed consent was obtained, the patient was brought to the procedure room where he was placed in the supine position with all pressure points well padded.  The penis was prepped and draped in sterile fashion. A flexible cystoscope was introduced through a well-lubricated urethra. The stent was visualized, grasped with a flexible grasper and removed intact and discarded    The flexible cystoscope was removed and the findings were described to the patient.     ASSESSMENT AND PLAN: 55-year-old man with a long history of nephrolithiasis and multiple prior stone episodes found to have obstructing right distal ureteral stone s/p right ureteroscopy and laser lithotripsy 1/21/2021 here for stent removal. Stent removed. Discussed Caox and Uric acid stone diagnosis and stone prevention diet. Patient drinks significant amounts of tea, he said he will work on reducing htis    - follow up in 4-6 weeks with BERE and Gabriel prior    Hudson Montana MD   [facility]  [phone] clinic phone

## 2021-02-01 NOTE — NURSING NOTE
Chief Complaint   Patient presents with     Right ureteral stone     in office stent removal today   Prior to the start of the procedure and with procedural staff participation, I verbally confirmed the patient s identity using two indicators, relevant allergies, that the procedure was appropriate and matched the consent or emergent situation, and that the correct equipment/implants were available. Immediately prior to starting the procedure I conducted the Time Out with the procedural staff and re-confirmed the patient s name, procedure, and site/side. I have wiped the patient off with the povidone-Iodine solution, draped them,  used Lidocaine hydrochloride jelly, and instilled sterile water into the bladder. (The Joint Commission universal protocol was followed.)  Yes    Sedation (Moderate or Deep): None  5mL 2% lidocaine hydrochloride Urojet instilled into urethra.    NDC# [address]-5  Lot #: [id number]  Expiration Date:  7-22  Karie Holder LPN

## 2021-02-01 NOTE — LETTER
2/1/2021       RE: Ashutosh Moraes  [id number]  [place] [id number]     Dear Colleague,    Thank you for referring your patient, Ashutosh Moraes, to the [facility] [place] at [facility]. Please see a copy of my visit note below.    PRE-PROCEDURE DIAGNOSIS: right ureteral stone    POST-PROCEDURE DIAGNOSIS: right ureteral stone    PROCEDURE: Cystoscopy with right ureteral stent removal    HISTORY: 55-year-old man with a long history of nephrolithiasis and multiple prior stone episodes found to have obstructing right distal ureteral stone s/p right ureteroscopy and laser lithotripsy 1/21/2021 here for stent removal    REVIEW OF OFFICE STUDIES (e.g., uroflow or PVR): stone analysis  Orange fragments composed primarily of   uric acid.   Brown fragments composed primarily of   calcium oxalate monohydrate.     DESCRIPTION OF PROCEDURE: After informed consent was obtained, the patient was brought to the procedure room where he was placed in the supine position with all pressure points well padded.  The penis was prepped and draped in sterile fashion. A flexible cystoscope was introduced through a well-lubricated urethra. The stent was visualized, grasped with a flexible grasper and removed intact and discarded    The flexible cystoscope was removed and the findings were described to the patient.     ASSESSMENT AND PLAN: 55-year-old man with a long history of nephrolithiasis and multiple prior stone episodes found to have obstructing right distal ureteral stone s/p right ureteroscopy and laser lithotripsy 1/21/2021 here for stent removal. Stent removed. Discussed Caox and Uric acid stone diagnosis and stone prevention diet. Patient drinks significant amounts of tea, he said he will work on reducing htis    - follow up in 4-6 weeks with RB and Lithdeniz prior    Hudson Montana MD   [facility]  [phone] clinic phone

## 2021-02-01 NOTE — PATIENT INSTRUCTIONS

## 2021-03-13 ENCOUNTER — TRANSFERRED RECORDS (OUTPATIENT)
Dept: HEALTH INFORMATION MANAGEMENT | Facility: CLINIC | Age: 56
End: 2021-03-13

## 2021-04-01 ENCOUNTER — HOSPITAL ENCOUNTER (OUTPATIENT)
Dept: ULTRASOUND IMAGING | Facility: CLINIC | Age: 56
Discharge: HOME | End: 2021-04-01
Attending: STUDENT IN AN ORGANIZED HEALTH CARE EDUCATION/TRAINING PROGRAM | Admitting: STUDENT IN AN ORGANIZED HEALTH CARE EDUCATION/TRAINING PROGRAM
Payer: COMMERCIAL

## 2021-04-01 DIAGNOSIS — N20.1 RIGHT URETERAL STONE: ICD-10-CM

## 2021-04-01 PROCEDURE — 76770 US EXAM ABDO BACK WALL COMP: CPT

## 2021-04-09 ENCOUNTER — VIRTUAL VISIT (OUTPATIENT)
Dept: UROLOGY | Facility: CLINIC | Age: 56
End: 2021-04-09
Payer: COMMERCIAL

## 2021-04-09 VITALS — WEIGHT: 242 LBS | HEIGHT: 68 IN | BODY MASS INDEX: 36.68 KG/M2

## 2021-04-09 DIAGNOSIS — N20.0 KIDNEY STONE: Primary | ICD-10-CM

## 2021-04-09 PROCEDURE — 99213 OFFICE O/P EST LOW 20 MIN: CPT | Mod: 95 | Performed by: PHYSICIAN ASSISTANT

## 2021-04-09 ASSESSMENT — MIFFLIN-ST. JEOR: SCORE: 1917.2

## 2021-04-09 ASSESSMENT — PAIN SCALES - GENERAL: PAINLEVEL: NO PAIN (0)

## 2021-04-09 NOTE — PATIENT INSTRUCTIONS
-3L of water daily with 1oz of lemon juice  -Low Na diet (watch your salt intake!) 2300-3500mg/day  -Below are some foods that you should avoid/limit  eating and drinking while on a low oxalate diet:    Spinach  Rhubarb  Beets  Strawberries  Nuts (almonds, peanuts, walnuts, hazelnuts, and pecans)  Chocolate  Tea (green, black, iced, or instant)  Coffee  Cola  Wheat bran  White corn grits  Wheat germ  Whole-wheat flour  Berries (blackberries, gooseberries, black raspberries)  [place] grapes  Red currants  Damson plums  Peels from lilo, limes and oranges  Tangerines  Fruitcake or other desserts that contain the fruits listed above    Protein foods:  Baked beans  Peanut butter  Tofu  Sweet potatoes  Vegetable soups that contain very few vegetables  Beans (wax, legume and soy)  Celery  Dark, leafy greens (swiss chard, endive, escarole, parsley)  Eggplant  Leeks  Summer squash    Beverages:  Draft beer    -Litholink 6 months. If abnormalities will refer you to Nephrology to see if a medication could help prevent stones.     -CT 1 year

## 2021-04-09 NOTE — LETTER
4/9/2021     RE: Ashutosh Moraes  [id number]  [facility] [address]     Dear Colleague,    Thank you for referring your patient, Ashutosh Moraes, to the [facility] [place] at [facility]. Please see a copy of my visit note below.    *SEND LINK TO CELL PHONE*    Ashutosh is a 55 year old who is being evaluated via a billable video visit.      How would you like to obtain your AVS? MyChart  If the video visit is dropped, the invitation should be resent by: Text to cell phone: [phone]  Will anyone else be joining your video visit? No    Video Start Time: 10:38 AM  Video-Visit Details    Type of service:  Video Visit    Video End Time:10:47 AM    Originating Location (pt. Location): Home    Distant Location (provider location):  [facility] [place]     Platform used for Video Visit: [employer]     HISTORY:Pleasant 55-year-old man with a long history of nephrolithiasis and multiple prior stone episodes found to have obstructing right distal ureteral stone s/p right ureteroscopy and laser lithotripsy 1/21/2021 and stent removal 2/1/21. Being seen today via virtual video visit to review SUKI and Litholink.    SUKI without hydro, poss 4mm left renal stone (small bilateral stones on recent CT). Caox and Uric acid stones.    Litholink: low urine pH, mild hypocitraturia, low urine vol    Past Medical History:   Diagnosis Date     CAD (coronary artery disease)     Cardiac cath 7/2009: RAMANA to OM     Diabetes (H)     Type 2     Gastro-oesophageal reflux disease      HLD (hyperlipidemia)      Hypertension      Kidney stones      Sleep apnea     Doesn't use a CPAP     Stented coronary artery 2009     Past Surgical History:   Procedure Laterality Date     APPENDECTOMY       COLONOSCOPY  2020     COMBINED CYSTOSCOPY, RETROGRADES, URETEROSCOPY, LASER HOLMIUM LITHOTRIPSY URETER(S), INSERT STENT Right 1/21/2021    Procedure: Cystoscopy, right  ureteroscopy with laser lithotripsy, right ureteroscopy with stone basketing, right retrograde pyelogram, right ureteral stent placement, fluoroscopic interpretation <1 hour physician time;  Surgeon: Hudson Montana MD;  Location: [facility] OR     HEART CATH LEFT HEART CATH  07/02/2009    RAMANA to OM        Allergies   Allergen Reactions     Atorvastatin Rash     ROS: 14-pt ROS neg other than that noted in the HPI.  PSYCH: NAD  EYES: EOMI  MOUTH: MMM  NEURO: AAO x3    ASSESSMENT AND PLAN: 55-year-old man with a long history of nephrolithiasis and multiple prior stone episodes  -Repeat litholink 6 months with increasing PO vol, low Na, low ox diet, adding lemon juice to water. If continues to have abnormalities, will refer to Neph  -CT 1 year to monitor renal stones    Mel Chen PA-C  [facility]    Review of the result(s) of each unique test - CTs, Op notes, UA, Cr, SUKI, litholink  No LOS data to display   26 min : Time spent doing chart review, history and exam, documentation and further activities per the note

## 2021-04-09 NOTE — PROGRESS NOTES
*SEND LINK TO CELL PHONE*    Ashutosh is a 55 year old who is being evaluated via a billable video visit.      How would you like to obtain your AVS? MyChart  If the video visit is dropped, the invitation should be resent by: Text to cell phone: [phone]  Will anyone else be joining your video visit? No      Video Start Time: 10:38 AM  Video-Visit Details    Type of service:  Video Visit    Video End Time:10:47 AM    Originating Location (pt. Location): Home    Distant Location (provider location):  [facility] [place]     Platform used for Video Visit: [employer]     HISTORY:Pleasant 55-year-old man with a long history of nephrolithiasis and multiple prior stone episodes found to have obstructing right distal ureteral stone s/p right ureteroscopy and laser lithotripsy 1/21/2021 and stent removal 2/1/21. Being seen today via virtual video visit to review SUKI and Litholink.    SUKI without hydro, poss 4mm left renal stone (small bilateral stones on recent CT). Caox and Uric acid stones.    Litholink: low urine pH, mild hypocitraturia, low urine vol    Past Medical History:   Diagnosis Date     CAD (coronary artery disease)     Cardiac cath 7/2009: RAMANA to OM     Diabetes (H)     Type 2     Gastro-oesophageal reflux disease      HLD (hyperlipidemia)      Hypertension      Kidney stones      Sleep apnea     Doesn't use a CPAP     Stented coronary artery 2009     Past Surgical History:   Procedure Laterality Date     APPENDECTOMY       COLONOSCOPY  2020     COMBINED CYSTOSCOPY, RETROGRADES, URETEROSCOPY, LASER HOLMIUM LITHOTRIPSY URETER(S), INSERT STENT Right 1/21/2021    Procedure: Cystoscopy, right ureteroscopy with laser lithotripsy, right ureteroscopy with stone basketing, right retrograde pyelogram, right ureteral stent placement, fluoroscopic interpretation <1 hour physician time;  Surgeon: Hudson Montana MD;  Location: [facility] OR     HEART CATH LEFT HEART CATH  07/02/2009    RAMANA to OM         Allergies   Allergen Reactions     Atorvastatin Rash     ROS: 14-pt ROS neg other than that noted in the HPI.  PSYCH: NAD  EYES: EOMI  MOUTH: MMM  NEURO: AAO x3    ASSESSMENT AND PLAN: 55-year-old man with a long history of nephrolithiasis and multiple prior stone episodes  -Repeat litholink 6 months with increasing PO vol, low Na, low ox diet, adding lemon juice to water. If continues to have abnormalities, will refer to Neph  -CT 1 year to monitor renal stones    Mel Chen PA-C  [facility]    Review of the result(s) of each unique test - CTs, Op notes, UA, Cr, SUKI, litholink  No LOS data to display   26 min : Time spent doing chart review, history and exam, documentation and further activities per the note

## 2021-04-13 ENCOUNTER — TELEPHONE (OUTPATIENT)
Dept: UROLOGY | Facility: CLINIC | Age: 56
End: 2021-04-13

## 2021-04-13 NOTE — TELEPHONE ENCOUNTER
----- Message from Maine Llamas sent at 4/12/2021  3:36 PM CDT -----  Gabriel and virtual in six months     FLIP

## 2021-05-08 ENCOUNTER — HEALTH MAINTENANCE LETTER (OUTPATIENT)
Age: 56
End: 2021-05-08

## 2021-05-16 ENCOUNTER — HOSPITAL ENCOUNTER (EMERGENCY)
Facility: CLINIC | Age: 56
Discharge: HOME | End: 2021-05-16
Attending: EMERGENCY MEDICINE | Admitting: EMERGENCY MEDICINE
Payer: COMMERCIAL

## 2021-05-16 ENCOUNTER — APPOINTMENT (OUTPATIENT)
Dept: CT IMAGING | Facility: CLINIC | Age: 56
End: 2021-05-16
Attending: EMERGENCY MEDICINE
Payer: COMMERCIAL

## 2021-05-16 VITALS
TEMPERATURE: 96.5 F | RESPIRATION RATE: 16 BRPM | SYSTOLIC BLOOD PRESSURE: 134 MMHG | HEART RATE: 89 BPM | OXYGEN SATURATION: 97 % | DIASTOLIC BLOOD PRESSURE: 100 MMHG

## 2021-05-16 DIAGNOSIS — N20.1 URETERAL STONE: ICD-10-CM

## 2021-05-16 LAB
ALBUMIN UR-MCNC: 70 MG/DL
ANION GAP SERPL CALCULATED.3IONS-SCNC: 4 MMOL/L (ref 3–14)
APPEARANCE UR: ABNORMAL
BASOPHILS # BLD AUTO: 0.1 10E9/L (ref 0–0.2)
BASOPHILS NFR BLD AUTO: 0.9 %
BILIRUB UR QL STRIP: NEGATIVE
BUN SERPL-MCNC: 27 MG/DL (ref 7–30)
CALCIUM SERPL-MCNC: 8.5 MG/DL (ref 8.5–10.1)
CHLORIDE SERPL-SCNC: 102 MMOL/L (ref 94–109)
CO2 SERPL-SCNC: 28 MMOL/L (ref 20–32)
COLOR UR AUTO: ABNORMAL
CREAT SERPL-MCNC: 2.05 MG/DL (ref 0.66–1.25)
DIFFERENTIAL METHOD BLD: NORMAL
EOSINOPHIL # BLD AUTO: 0.3 10E9/L (ref 0–0.7)
EOSINOPHIL NFR BLD AUTO: 4.2 %
ERYTHROCYTE [DISTWIDTH] IN BLOOD BY AUTOMATED COUNT: 11.9 % (ref 10–15)
GFR SERPL CREATININE-BSD FRML MDRD: 36 ML/MIN/{1.73_M2}
GLUCOSE SERPL-MCNC: 245 MG/DL (ref 70–99)
GLUCOSE UR STRIP-MCNC: 50 MG/DL
HCT VFR BLD AUTO: 40.7 % (ref 40–53)
HGB BLD-MCNC: 13.7 G/DL (ref 13.3–17.7)
HGB UR QL STRIP: ABNORMAL
IMM GRANULOCYTES # BLD: 0 10E9/L (ref 0–0.4)
IMM GRANULOCYTES NFR BLD: 0.3 %
KETONES UR STRIP-MCNC: NEGATIVE MG/DL
LEUKOCYTE ESTERASE UR QL STRIP: NEGATIVE
LYMPHOCYTES # BLD AUTO: 1.9 10E9/L (ref 0.8–5.3)
LYMPHOCYTES NFR BLD AUTO: 25.3 %
MCH RBC QN AUTO: 29.2 PG (ref 26.5–33)
MCHC RBC AUTO-ENTMCNC: 33.7 G/DL (ref 31.5–36.5)
MCV RBC AUTO: 87 FL (ref 78–100)
MONOCYTES # BLD AUTO: 0.5 10E9/L (ref 0–1.3)
MONOCYTES NFR BLD AUTO: 6.9 %
MUCOUS THREADS #/AREA URNS LPF: PRESENT /LPF
NEUTROPHILS # BLD AUTO: 4.6 10E9/L (ref 1.6–8.3)
NEUTROPHILS NFR BLD AUTO: 62.4 %
NITRATE UR QL: NEGATIVE
NRBC # BLD AUTO: 0 10*3/UL
NRBC BLD AUTO-RTO: 0 /100
PH UR STRIP: 5.5 PH (ref 5–7)
PLATELET # BLD AUTO: 153 10E9/L (ref 150–450)
POTASSIUM SERPL-SCNC: 4 MMOL/L (ref 3.4–5.3)
RBC # BLD AUTO: 4.69 10E12/L (ref 4.4–5.9)
RBC #/AREA URNS AUTO: >182 /HPF (ref 0–2)
SODIUM SERPL-SCNC: 134 MMOL/L (ref 133–144)
SOURCE: ABNORMAL
SP GR UR STRIP: 1.02 (ref 1–1.03)
SQUAMOUS #/AREA URNS AUTO: <1 /HPF (ref 0–1)
UROBILINOGEN UR STRIP-MCNC: NORMAL MG/DL (ref 0–2)
WBC # BLD AUTO: 7.4 10E9/L (ref 4–11)
WBC #/AREA URNS AUTO: 2 /HPF (ref 0–5)

## 2021-05-16 PROCEDURE — 250N000011 HC RX IP 250 OP 636: Performed by: EMERGENCY MEDICINE

## 2021-05-16 PROCEDURE — 96361 HYDRATE IV INFUSION ADD-ON: CPT

## 2021-05-16 PROCEDURE — 81001 URINALYSIS AUTO W/SCOPE: CPT | Performed by: EMERGENCY MEDICINE

## 2021-05-16 PROCEDURE — 96374 THER/PROPH/DIAG INJ IV PUSH: CPT

## 2021-05-16 PROCEDURE — 258N000003 HC RX IP 258 OP 636: Performed by: EMERGENCY MEDICINE

## 2021-05-16 PROCEDURE — 96375 TX/PRO/DX INJ NEW DRUG ADDON: CPT

## 2021-05-16 PROCEDURE — 80048 BASIC METABOLIC PNL TOTAL CA: CPT | Performed by: EMERGENCY MEDICINE

## 2021-05-16 PROCEDURE — 74176 CT ABD & PELVIS W/O CONTRAST: CPT

## 2021-05-16 PROCEDURE — 85025 COMPLETE CBC W/AUTO DIFF WBC: CPT | Performed by: EMERGENCY MEDICINE

## 2021-05-16 PROCEDURE — 99285 EMERGENCY DEPT VISIT HI MDM: CPT | Mod: 25

## 2021-05-16 RX ORDER — TAMSULOSIN HYDROCHLORIDE 0.4 MG/1
0.4 CAPSULE ORAL DAILY
Qty: 10 CAPSULE | Refills: 0 | Status: SHIPPED | OUTPATIENT
Start: 2021-05-16 | End: 2021-05-26

## 2021-05-16 RX ORDER — ONDANSETRON 2 MG/ML
4 INJECTION INTRAMUSCULAR; INTRAVENOUS EVERY 30 MIN PRN
Status: DISCONTINUED | OUTPATIENT
Start: 2021-05-16 | End: 2021-05-16 | Stop reason: HOSPADM

## 2021-05-16 RX ORDER — MORPHINE SULFATE 4 MG/ML
4 INJECTION, SOLUTION INTRAMUSCULAR; INTRAVENOUS
Status: DISCONTINUED | OUTPATIENT
Start: 2021-05-16 | End: 2021-05-16 | Stop reason: HOSPADM

## 2021-05-16 RX ORDER — SENNA AND DOCUSATE SODIUM 50; 8.6 MG/1; MG/1
1-2 TABLET, FILM COATED ORAL 2 TIMES DAILY PRN
Qty: 30 TABLET | Refills: 0 | Status: ON HOLD | OUTPATIENT
Start: 2021-05-16 | End: 2021-06-03

## 2021-05-16 RX ORDER — OXYCODONE HYDROCHLORIDE 5 MG/1
5 TABLET ORAL EVERY 6 HOURS PRN
Qty: 12 TABLET | Refills: 0 | Status: ON HOLD | OUTPATIENT
Start: 2021-05-16 | End: 2021-05-22

## 2021-05-16 RX ORDER — KETOROLAC TROMETHAMINE 15 MG/ML
15 INJECTION, SOLUTION INTRAMUSCULAR; INTRAVENOUS ONCE
Status: COMPLETED | OUTPATIENT
Start: 2021-05-16 | End: 2021-05-16

## 2021-05-16 RX ORDER — SODIUM CHLORIDE 9 MG/ML
INJECTION, SOLUTION INTRAVENOUS CONTINUOUS
Status: DISCONTINUED | OUTPATIENT
Start: 2021-05-16 | End: 2021-05-16 | Stop reason: HOSPADM

## 2021-05-16 RX ORDER — ONDANSETRON 4 MG/1
4 TABLET, ORALLY DISINTEGRATING ORAL EVERY 6 HOURS PRN
Qty: 20 TABLET | Refills: 0 | Status: SHIPPED | OUTPATIENT
Start: 2021-05-16 | End: 2022-12-16

## 2021-05-16 RX ADMIN — SODIUM CHLORIDE 1000 ML: 9 INJECTION, SOLUTION INTRAVENOUS at 09:52

## 2021-05-16 RX ADMIN — SODIUM CHLORIDE 125 ML/HR: 9 INJECTION, SOLUTION INTRAVENOUS at 11:03

## 2021-05-16 RX ADMIN — KETOROLAC TROMETHAMINE 15 MG: 15 INJECTION, SOLUTION INTRAMUSCULAR; INTRAVENOUS at 12:20

## 2021-05-16 RX ADMIN — ONDANSETRON 4 MG: 2 INJECTION INTRAMUSCULAR; INTRAVENOUS at 09:50

## 2021-05-16 RX ADMIN — MORPHINE SULFATE 4 MG: 4 INJECTION INTRAVENOUS at 10:05

## 2021-05-16 RX ADMIN — MORPHINE SULFATE 4 MG: 4 INJECTION INTRAVENOUS at 09:51

## 2021-05-16 ASSESSMENT — ENCOUNTER SYMPTOMS
DIARRHEA: 0
CONSTIPATION: 0
ABDOMINAL PAIN: 1
BLOOD IN STOOL: 0
NAUSEA: 1
SHORTNESS OF BREATH: 0
VOMITING: 1

## 2021-05-16 NOTE — ED NOTES
Patient resting in bed, awaiting results.  Pain and nausea improved, patient reports he is comfortable. Denies needs at this time.

## 2021-05-16 NOTE — ED PROVIDER NOTES
History   Chief Complaint:  Abdominal Pain and Nausea     The history is provided by the patient.      Ashutosh Moraes is a 55 year old male with history of CAD, GERD, hyperlipidemia, hypertension and kidney stones who presents with abdominal pain and nausea. The patient tells us that yesterday evening he started to have dull, constant, non radiating lower left quadrant pain which is accompanied by nausea and one episode of vomiting. He was able to sleep through the night yesterday but woke up with the same pain which prompted him to take 1000 mg of tylenol at 0900. The patient denies urinary symptoms, changes to bowel movements, testicular pain, chest pain or shortness of breath.     To note, The patient does have a history of kidney stones and had to have one surgically removed in January of this year.     Review of Systems   Respiratory: Negative for shortness of breath.    Cardiovascular: Negative for chest pain.   Gastrointestinal: Positive for abdominal pain, nausea and vomiting. Negative for blood in stool, constipation and diarrhea.   Genitourinary: Negative.  Negative for testicular pain.   All other systems reviewed and are negative.    Allergies:  Atorvastatin     Medications:  Aspirin 81 mg   Colace  Zestoretic  Glucophage-XR  Zofran   Crestor  Flomax    Past Medical History:    CAD  Diabetes  GERD  Hyperlipidemia  Kidney stones  Sleep apnea  Stented coronary artery   Ureteral stone   Obesity  Hypertension      Past Surgical History:    Appendectomy  Colonoscopy  Cytoscopy  Heart cath left      Family History:    Asthma   CAD    Social History:  The patient presents to the ED alone.   The patient denies tobacco, alcohol or illegal drug use.     Physical Exam     Patient Vitals for the past 24 hrs:   BP Temp Temp src Pulse Resp SpO2   05/16/21 0914 (!) 168/93 96.5  F (35.8  C) Temporal 80 18 100 %       Physical Exam     Constitutional: Well developed, nontox appearance  Head: Atraumatic.   Neck:  no  stridor  Eyes: no scleral icterus  Cardiovascular: RRR, 2+ bilat radial pulses  Pulmonary/Chest: nml resp effort, Clear BS bilat  Abdominal: ND, soft, NT, no rebound or guarding   : no CVA tenderness bilat  Ext: Warm, well perfused, no edema  Neurological: A&O, symmetric facies, moves ext x4  Skin: Skin is warm and dry.   Psychiatric: Behavior is normal. Thought content normal.   Nursing note and vitals reviewed.     Emergency Department Course     Imaging:    CT Abdomen Pelvis w/o contrast:   1.  Obstructing 0.4 cm stone in the proximal left ureter causes mild  left hydronephrosis.  2.  Several nonobstructing stones are also noted in both kidneys, with  the largest on the right measuring 0.5 cm.  3.  Diffuse fatty infiltration of the liver. Reading per radiology.     Laboratory:     CBC: WBC 7.4, HGB 13.7,      BMP: Glucose 245(H); Creatinine 2.05(H);  36(L)    UA with microscopic: Glucose 50; Blood Moderate; Protein Albumins 80; RBC >182(H); Mucous Present o/w WNL     Emergency Department Course:    Reviewed:  I reviewed nursing notes, vitals, past medical history and care everywhere    Assessments:  0918 I obtained history and examined the patient as noted above.   1243 I rechecked the patient and explained findings.     Interventions:  0950 Zofran 4 mg IV x 3   1005 Morphine 4 mg IV x 3  1220 Toradol 15 mg IV    Disposition:  The patient was discharged to home.     Impression & Plan     Medical Decision Makin year old male presenting w/ LLQ/L flank pain     Signs and symptoms consistent with ureterolithiasis which was confirmed on CT imaging.  Doubt colitis, cholecystitis, aneurysm, dissection, volvulus, appendicitis, splenic pathology, ulcer,  pneumonia, rib fracture, UTI, pyelonephritis.  Patients pain is controlled in ED.  I discussed with the pt that flomax is likely not indicated given stone <5mm, but prescribed for the patient upon his request.  No signs of infected stone.  Patient is  hemodynamically stable in ED. At this time I feel the patient is safe for discharge.  Recommendations given regarding follow up with PCP/Urology and return to the emergency department as needed for new or worsening symptoms.  Counseled on all results, diagnosis and disposition.  Pt understanding and agreeable to plan. Patient discharged in stable condition.      Diagnosis:    ICD-10-CM    1. Ureteral stone  N20.1        Discharge Medications:  New Prescriptions    ONDANSETRON (ZOFRAN ODT) 4 MG ODT TAB    Take 1 tablet (4 mg) by mouth every 6 hours as needed for nausea or vomiting    OXYCODONE (ROXICODONE) 5 MG TABLET    Take 1 tablet (5 mg) by mouth every 6 hours as needed for pain    SENNA-DOCUSATE SODIUM (SENNA S) 8.6-50 MG TABLET    Take 1-2 tablets by mouth 2 times daily as needed (if taking oxycodone)       Scribe Disclosure:  Calixto MARIN, am serving as a scribe at 9:18 AM on 5/16/2021 to document services personally performed by Masoud Markham MD, based on my observations and the provider's statements to me.            Masoud Markham MD  05/16/21 [id number]

## 2021-05-16 NOTE — DISCHARGE INSTRUCTIONS
1. Drink plenty of fluids at home.  2. Use ibuprofen sparingly.  Use it only as a last resort to pain control.  3. Take acetaminophen 500 to 1000 mg by mouth every 4 to 6 hours as needed for pain or fever.  Do not take more than 4000 mg in 24 hours.  Do not take within 6 hours of another acetaminophen containing medication such as norco (vicodin) or percocet.  4. Take oxycodone as prescribed as needed for breakthrough pain.  5. Please follow-up with your primary care doctor or Urology as needed.  6. Please return to the ED as needed for new or worsening symptoms such as fever greater than 100.4 F, severe and uncontrollable pain vomiting and unable to keep anything down, any other worrisome symptoms.

## 2021-05-16 NOTE — ED TRIAGE NOTES
Pt arrives with LLQ that started last night. Pt denies radiation to back. Patient endorses some nausea, denies vomiting. Pt took 1000mg tylenol at 0900, hx of kidney stones. ABCs intact.

## 2021-05-16 NOTE — ED NOTES
Patient drowsy after morphine, two doses.  SPO2 decreased to 77%, patient placed on nasal cannula oxygen, 2L. He does reports his pain has improved some, rated 6/10.

## 2021-05-20 ENCOUNTER — HOSPITAL ENCOUNTER (INPATIENT)
Facility: CLINIC | Age: 56
LOS: 2 days | Discharge: HOME | End: 2021-05-22
Attending: PHYSICIAN ASSISTANT | Admitting: INTERNAL MEDICINE
Payer: COMMERCIAL

## 2021-05-20 ENCOUNTER — APPOINTMENT (OUTPATIENT)
Dept: CT IMAGING | Facility: CLINIC | Age: 56
End: 2021-05-20
Attending: PHYSICIAN ASSISTANT
Payer: COMMERCIAL

## 2021-05-20 DIAGNOSIS — N20.0 KIDNEY STONE: ICD-10-CM

## 2021-05-20 DIAGNOSIS — N17.9 ACUTE KIDNEY INJURY (H): ICD-10-CM

## 2021-05-20 DIAGNOSIS — E11.65 TYPE 2 DIABETES MELLITUS WITH HYPERGLYCEMIA, WITHOUT LONG-TERM CURRENT USE OF INSULIN (H): ICD-10-CM

## 2021-05-20 DIAGNOSIS — I10 ESSENTIAL HYPERTENSION: Primary | ICD-10-CM

## 2021-05-20 DIAGNOSIS — K59.00 CONSTIPATION: ICD-10-CM

## 2021-05-20 LAB
ALBUMIN UR-MCNC: 20 MG/DL
ANION GAP SERPL CALCULATED.3IONS-SCNC: 10 MMOL/L (ref 3–14)
APPEARANCE UR: CLEAR
BILIRUB UR QL STRIP: NEGATIVE
BUN SERPL-MCNC: 53 MG/DL (ref 7–30)
CALCIUM SERPL-MCNC: 8.2 MG/DL (ref 8.5–10.1)
CHLORIDE SERPL-SCNC: 97 MMOL/L (ref 94–109)
CO2 SERPL-SCNC: 24 MMOL/L (ref 20–32)
COLOR UR AUTO: ABNORMAL
CREAT SERPL-MCNC: 5.39 MG/DL (ref 0.66–1.25)
DIFFERENTIAL METHOD BLD: ABNORMAL
EOSINOPHIL # BLD AUTO: 0.2 10E9/L (ref 0–0.7)
EOSINOPHIL NFR BLD AUTO: 2 %
ERYTHROCYTE [DISTWIDTH] IN BLOOD BY AUTOMATED COUNT: 12 % (ref 10–15)
GFR SERPL CREATININE-BSD FRML MDRD: 11 ML/MIN/{1.73_M2}
GLUCOSE BLDC GLUCOMTR-MCNC: 131 MG/DL (ref 70–99)
GLUCOSE SERPL-MCNC: 144 MG/DL (ref 70–99)
GLUCOSE UR STRIP-MCNC: NEGATIVE MG/DL
HCT VFR BLD AUTO: 35.6 % (ref 40–53)
HGB BLD-MCNC: 12.3 G/DL (ref 13.3–17.7)
HGB UR QL STRIP: ABNORMAL
KETONES UR STRIP-MCNC: NEGATIVE MG/DL
LABORATORY COMMENT REPORT: NORMAL
LEUKOCYTE ESTERASE UR QL STRIP: NEGATIVE
LYMPHOCYTES # BLD AUTO: 1.3 10E9/L (ref 0.8–5.3)
LYMPHOCYTES NFR BLD AUTO: 11 %
MCH RBC QN AUTO: 29.4 PG (ref 26.5–33)
MCHC RBC AUTO-ENTMCNC: 34.6 G/DL (ref 31.5–36.5)
MCV RBC AUTO: 85 FL (ref 78–100)
MONOCYTES # BLD AUTO: 1.2 10E9/L (ref 0–1.3)
MONOCYTES NFR BLD AUTO: 10 %
MUCOUS THREADS #/AREA URNS LPF: PRESENT /LPF
NEUTROPHILS # BLD AUTO: 8.8 10E9/L (ref 1.6–8.3)
NEUTROPHILS NFR BLD AUTO: 77 %
NITRATE UR QL: NEGATIVE
PH UR STRIP: 5.5 PH (ref 5–7)
PLATELET # BLD AUTO: 148 10E9/L (ref 150–450)
POTASSIUM SERPL-SCNC: 3.6 MMOL/L (ref 3.4–5.3)
RBC # BLD AUTO: 4.19 10E12/L (ref 4.4–5.9)
RBC #/AREA URNS AUTO: >182 /HPF (ref 0–2)
SARS-COV-2 RNA RESP QL NAA+PROBE: NEGATIVE
SODIUM SERPL-SCNC: 131 MMOL/L (ref 133–144)
SOURCE: ABNORMAL
SP GR UR STRIP: 1.01 (ref 1–1.03)
SPECIMEN SOURCE: NORMAL
SQUAMOUS #/AREA URNS AUTO: <1 /HPF (ref 0–1)
UROBILINOGEN UR STRIP-MCNC: NORMAL MG/DL (ref 0–2)
WBC # BLD AUTO: 11.5 10E9/L (ref 4–11)
WBC #/AREA URNS AUTO: 5 /HPF (ref 0–5)

## 2021-05-20 PROCEDURE — 250N000011 HC RX IP 250 OP 636: Performed by: PHYSICIAN ASSISTANT

## 2021-05-20 PROCEDURE — 96376 TX/PRO/DX INJ SAME DRUG ADON: CPT

## 2021-05-20 PROCEDURE — 81001 URINALYSIS AUTO W/SCOPE: CPT | Performed by: EMERGENCY MEDICINE

## 2021-05-20 PROCEDURE — 120N000001 HC R&B MED SURG/OB

## 2021-05-20 PROCEDURE — C9803 HOPD COVID-19 SPEC COLLECT: HCPCS

## 2021-05-20 PROCEDURE — 258N000003 HC RX IP 258 OP 636: Performed by: INTERNAL MEDICINE

## 2021-05-20 PROCEDURE — 250N000011 HC RX IP 250 OP 636: Performed by: INTERNAL MEDICINE

## 2021-05-20 PROCEDURE — 87635 SARS-COV-2 COVID-19 AMP PRB: CPT | Performed by: PHYSICIAN ASSISTANT

## 2021-05-20 PROCEDURE — 74176 CT ABD & PELVIS W/O CONTRAST: CPT

## 2021-05-20 PROCEDURE — 99285 EMERGENCY DEPT VISIT HI MDM: CPT | Mod: 25

## 2021-05-20 PROCEDURE — 96374 THER/PROPH/DIAG INJ IV PUSH: CPT

## 2021-05-20 PROCEDURE — 85025 COMPLETE CBC W/AUTO DIFF WBC: CPT | Performed by: EMERGENCY MEDICINE

## 2021-05-20 PROCEDURE — 80048 BASIC METABOLIC PNL TOTAL CA: CPT | Performed by: EMERGENCY MEDICINE

## 2021-05-20 PROCEDURE — 999N001017 HC STATISTIC GLUCOSE BY METER IP

## 2021-05-20 PROCEDURE — 96375 TX/PRO/DX INJ NEW DRUG ADDON: CPT

## 2021-05-20 PROCEDURE — 99223 1ST HOSP IP/OBS HIGH 75: CPT | Mod: AI | Performed by: INTERNAL MEDICINE

## 2021-05-20 RX ORDER — NALOXONE HYDROCHLORIDE 0.4 MG/ML
0.4 INJECTION, SOLUTION INTRAMUSCULAR; INTRAVENOUS; SUBCUTANEOUS
Status: DISCONTINUED | OUTPATIENT
Start: 2021-05-20 | End: 2021-05-22 | Stop reason: HOSPADM

## 2021-05-20 RX ORDER — TAMSULOSIN HYDROCHLORIDE 0.4 MG/1
0.4 CAPSULE ORAL DAILY
Status: DISCONTINUED | OUTPATIENT
Start: 2021-05-21 | End: 2021-05-22 | Stop reason: HOSPADM

## 2021-05-20 RX ORDER — HYDROMORPHONE HYDROCHLORIDE 1 MG/ML
0.5 INJECTION, SOLUTION INTRAMUSCULAR; INTRAVENOUS; SUBCUTANEOUS
Status: COMPLETED | OUTPATIENT
Start: 2021-05-20 | End: 2021-05-20

## 2021-05-20 RX ORDER — NALOXONE HYDROCHLORIDE 0.4 MG/ML
0.2 INJECTION, SOLUTION INTRAMUSCULAR; INTRAVENOUS; SUBCUTANEOUS
Status: DISCONTINUED | OUTPATIENT
Start: 2021-05-20 | End: 2021-05-22 | Stop reason: HOSPADM

## 2021-05-20 RX ORDER — HYDROMORPHONE HYDROCHLORIDE 1 MG/ML
.3-.5 INJECTION, SOLUTION INTRAMUSCULAR; INTRAVENOUS; SUBCUTANEOUS
Status: DISCONTINUED | OUTPATIENT
Start: 2021-05-20 | End: 2021-05-22 | Stop reason: HOSPADM

## 2021-05-20 RX ORDER — ONDANSETRON 2 MG/ML
4 INJECTION INTRAMUSCULAR; INTRAVENOUS EVERY 30 MIN PRN
Status: DISCONTINUED | OUTPATIENT
Start: 2021-05-20 | End: 2021-05-20

## 2021-05-20 RX ORDER — ROSUVASTATIN CALCIUM 20 MG/1
40 TABLET, COATED ORAL DAILY
Status: DISCONTINUED | OUTPATIENT
Start: 2021-05-21 | End: 2021-05-22 | Stop reason: HOSPADM

## 2021-05-20 RX ORDER — LIDOCAINE 40 MG/G
CREAM TOPICAL
Status: DISCONTINUED | OUTPATIENT
Start: 2021-05-20 | End: 2021-05-22 | Stop reason: HOSPADM

## 2021-05-20 RX ORDER — NICOTINE POLACRILEX 4 MG
15-30 LOZENGE BUCCAL
Status: DISCONTINUED | OUTPATIENT
Start: 2021-05-20 | End: 2021-05-22 | Stop reason: HOSPADM

## 2021-05-20 RX ORDER — ACETAMINOPHEN 325 MG/1
650 TABLET ORAL EVERY 4 HOURS PRN
Status: DISCONTINUED | OUTPATIENT
Start: 2021-05-20 | End: 2021-05-22 | Stop reason: HOSPADM

## 2021-05-20 RX ORDER — DEXTROSE MONOHYDRATE 25 G/50ML
25-50 INJECTION, SOLUTION INTRAVENOUS
Status: DISCONTINUED | OUTPATIENT
Start: 2021-05-20 | End: 2021-05-22 | Stop reason: HOSPADM

## 2021-05-20 RX ORDER — ONDANSETRON 2 MG/ML
4 INJECTION INTRAMUSCULAR; INTRAVENOUS EVERY 6 HOURS PRN
Status: DISCONTINUED | OUTPATIENT
Start: 2021-05-20 | End: 2021-05-22 | Stop reason: HOSPADM

## 2021-05-20 RX ORDER — OXYCODONE HYDROCHLORIDE 5 MG/1
5 TABLET ORAL EVERY 6 HOURS PRN
Status: DISCONTINUED | OUTPATIENT
Start: 2021-05-21 | End: 2021-05-22 | Stop reason: HOSPADM

## 2021-05-20 RX ORDER — ONDANSETRON 4 MG/1
4 TABLET, ORALLY DISINTEGRATING ORAL EVERY 6 HOURS PRN
Status: DISCONTINUED | OUTPATIENT
Start: 2021-05-20 | End: 2021-05-22 | Stop reason: HOSPADM

## 2021-05-20 RX ORDER — AMLODIPINE BESYLATE 5 MG/1
5 TABLET ORAL DAILY
Status: DISCONTINUED | OUTPATIENT
Start: 2021-05-20 | End: 2021-05-22 | Stop reason: HOSPADM

## 2021-05-20 RX ORDER — AMLODIPINE BESYLATE 5 MG/1
5 TABLET ORAL DAILY
Status: DISCONTINUED | OUTPATIENT
Start: 2021-05-21 | End: 2021-05-20

## 2021-05-20 RX ORDER — AMOXICILLIN 250 MG
1-2 CAPSULE ORAL 2 TIMES DAILY PRN
Status: DISCONTINUED | OUTPATIENT
Start: 2021-05-20 | End: 2021-05-22 | Stop reason: HOSPADM

## 2021-05-20 RX ORDER — SODIUM CHLORIDE 9 MG/ML
INJECTION, SOLUTION INTRAVENOUS CONTINUOUS
Status: DISCONTINUED | OUTPATIENT
Start: 2021-05-20 | End: 2021-05-22 | Stop reason: HOSPADM

## 2021-05-20 RX ADMIN — ONDANSETRON 4 MG: 2 INJECTION INTRAMUSCULAR; INTRAVENOUS at 17:08

## 2021-05-20 RX ADMIN — HYDROMORPHONE HYDROCHLORIDE 0.5 MG: 1 INJECTION, SOLUTION INTRAMUSCULAR; INTRAVENOUS; SUBCUTANEOUS at 21:15

## 2021-05-20 RX ADMIN — HYDROMORPHONE HYDROCHLORIDE 0.5 MG: 1 INJECTION, SOLUTION INTRAMUSCULAR; INTRAVENOUS; SUBCUTANEOUS at 22:38

## 2021-05-20 RX ADMIN — HYDROMORPHONE HYDROCHLORIDE 0.5 MG: 1 INJECTION, SOLUTION INTRAMUSCULAR; INTRAVENOUS; SUBCUTANEOUS at 19:02

## 2021-05-20 RX ADMIN — SODIUM CHLORIDE: 9 INJECTION, SOLUTION INTRAVENOUS at 22:38

## 2021-05-20 RX ADMIN — HYDROMORPHONE HYDROCHLORIDE 0.5 MG: 1 INJECTION, SOLUTION INTRAMUSCULAR; INTRAVENOUS; SUBCUTANEOUS at 19:29

## 2021-05-20 RX ADMIN — HYDROMORPHONE HYDROCHLORIDE 0.5 MG: 1 INJECTION, SOLUTION INTRAMUSCULAR; INTRAVENOUS; SUBCUTANEOUS at 18:09

## 2021-05-20 RX ADMIN — HYDROMORPHONE HYDROCHLORIDE 0.5 MG: 1 INJECTION, SOLUTION INTRAMUSCULAR; INTRAVENOUS; SUBCUTANEOUS at 17:07

## 2021-05-20 ASSESSMENT — ENCOUNTER SYMPTOMS
CHILLS: 0
FLANK PAIN: 1
DYSURIA: 0
CONSTIPATION: 1
BACK PAIN: 1
HEMATURIA: 0
DIFFICULTY URINATING: 0
FREQUENCY: 0
FEVER: 0
NAUSEA: 1
VOMITING: 1

## 2021-05-20 ASSESSMENT — MIFFLIN-ST. JEOR
SCORE: 1975.27
SCORE: 1953.49

## 2021-05-20 NOTE — ED PROVIDER NOTES
History   Chief Complaint  Flank Pain    HPI  Ashutosh Moraes is a 55 year old male with a history of kidney stones, Type II Diabetes, hypertension, hyperlipidemia, and CAD who presents for evaluation of left-sided flank pain. Ashutosh was seen recently on 05/16/21 with Dr. Markham for left-sided flank pain with associated nausea and vomiting. Labs were obtained and CT imaging of a 0.4 cm stone in the proximal left ureter is seen below. Today, he returns and complains of constipation for four days ago and increasing left flank pain which radiates to his back. He had a bout of emesis due to increasing nausea this week but that has resolved today. He denies any changes to his urinary symptoms. He drove back from [place] this afternoon and was not able to take an oxycodone. He states ibuprofen is the only medication he has taken which reduces his pain symptoms. The pain exacerbates at night prior to bed. He denies fever, chills, or nausea here.    CT Abdomen Pelvis w/o contrast: 05/16/21  1.  Obstructing 0.4 cm stone in the proximal left ureter causes mild  left hydronephrosis.  2.  Several nonobstructing stones are also noted in both kidneys, with  the largest on the right measuring 0.5 cm.  3.  Diffuse fatty infiltration of the liver. Reading per radiology.    Review of Systems   Constitutional: Negative for chills and fever.   Gastrointestinal: Positive for constipation, nausea (resolved) and vomiting (resolved).   Genitourinary: Positive for flank pain (left). Negative for difficulty urinating, dysuria, frequency, hematuria and urgency.   Musculoskeletal: Positive for back pain.   All other systems reviewed and are negative.      Allergies  Atorvastatin    Medications  Aspirin 81 mg  Colace  Zestoretic   Metformin   Omeprazole  Zofran   Crestor   Flomax     Past Medical History  CAD  Type II Diabetes   GERD  Hyperlipidemia  Hypertension  Kidney Stones   Sleep Apnea   Morbid Obesity     Past Surgical  "History  Appendectomy   Colonoscopy    Cystoscopy - Right Ureter  Heart Cath Left     Family History  Mother - Asthma   Father - CAD    Social History  Presents to the ED unaccompanied.     Physical Exam     Patient Vitals for the past 24 hrs:   BP Temp Temp src Pulse Resp SpO2 Height Weight   05/20/21 2045 (!) 186/111 -- -- 81 -- 96 % -- --   05/20/21 2030 (!) 178/111 -- -- 79 -- 97 % -- --   05/20/21 2015 (!) 140/89 -- -- 76 -- 93 % -- --   05/20/21 2000 (!) 163/96 -- -- 89 -- 97 % -- --   05/20/21 1931 (!) 168/108 -- -- 89 20 97 % -- --   05/20/21 1900 -- -- -- -- -- 97 % -- --   05/20/21 1845 136/88 -- -- 79 -- 91 % -- --   05/20/21 1830 136/82 -- -- 50 -- 92 % -- --   05/20/21 1800 92/66 -- -- 88 -- -- -- --   05/20/21 1745 (!) 135/92 -- -- 83 -- 95 % -- --   05/20/21 1730 135/82 -- -- 80 -- 94 % -- --   05/20/21 1619 (!) 159/94 98  F (36.7  C) Oral 88 20 100 % 1.727 m (5' 8\") 113.4 kg (250 lb)     Physical Exam  Constitutional: Pleasant. Cooperative.   Eyes: Pupils equally round and reactive  HENT: Head is normal in appearance. Oropharynx is normal with moist mucus membranes.  Cardiovascular: Regular rate and rhythm and without murmurs.  Respiratory: Normal respiratory effort, lungs are clear bilaterally.  GI: Abdomen is soft, non-tender, non-distended. No guarding, rebound, or rigidity.  Musculoskeletal: No asymmetry of the lower extremities, no tenderness to palpation. No CVA TTP.  Skin: Normal, without rash.  Neurologic: Cranial nerves grossly intact, normal cognition, no focal deficits. Alert and oriented x 3.   Psychiatric: Normal affect.  Nursing notes and vital signs reviewed.    Emergency Department Course     Imaging:  CT Abdomen Pelvis w/o Contrast  1.  Minimal distal migration of previously identified 5 x 3 mm proximal left ureteral stone. Mild left hydronephrosis persists with some increase in soft tissue stranding surrounding left ureter.  2.  Bilateral kidney stones remain present.  Reading per " radiology    Laboratory:  CBC: WBC: 11.5 (H), HGB: 12.3 (L), PLT: 148 (L)  BMP: Glucose 44 (H), Creatinine 5.39 (H), BUN 53 (H), GFR 11 (L), Sodium 131 (L), o/w WNL     UA with Microscopic: Blood: Large (A), Protein Albumin: 20 (A), RBC: >182 (H), Mucous: Present (A) o/w WNL    Asymptomatic COVID19 Virus PCR by nasopharyngeal swab pending    Emergency Department Course:  Reviewed:  I reviewed nursing notes, vitals, past medical history and care everywhere    Assessments:  1704 I physically examined the patient as documented above.  1949 I rechecked the patient.     Consults:   1954 I consulted with Dr. Kenyon, on call Urology, regarding the patient's history and presentation here in the emergency department.  2049 I consulted with Dr. Witt, on call Hospitalist, regarding the patient's history and presentation here in the emergency department.    Interventions:  1707 Dilaudid 0.5 mg IV   1708 Zofran 4 mg IV   1809 Dilaudid 0.5 mg IV   1902 Dilaudid 0.5 mg IV   1929 Dilaudid 0.5 mg IV     Disposition:  The patient was admitted to the hospital under the care of Dr. Witt.     Impression & Plan     Medical Decision Making:  Ashutosh Moraes is a 55 year old male who presents to the ED for evaluation of left-sided flank pain.  Patient seen in the ED 4 days ago and diagnosed with kidney stone.  He was sent home with supportive medications.  He returns as his pain is intractable.  See HPI as above for additional details.  Vitals and physical exam as above.  Patient does note inability to have a stool over the last 4 days as well and is now having diffuse abdominal pain.  CT thus reordered to rule out other nefarious pathology such as bowel obstruction.  CT shows evidence only for relatively unmoved kidney stone.  Lab work is notable for marked elevation to creatinine at 5.39, up from 2.05 yesterday.  Remainder of lab work as above.  Discussed the case with urology, who recommended admission, n.p.o. after midnight, with  plan for surgical intervention tomorrow a.m.  Plan discussed with patient, who is in agreement.  Discussed case with hospitalist, who agreed to admit the patient. Discussed reasons to return. All questions answered. Patient discharged to home in stable condition.    Covid-19  Ashutosh Moraes was evaluated during a global COVID-19 pandemic, which necessitated consideration that the patient might be at risk for infection with the SARS-CoV-2 virus that causes COVID-19.   Applicable protocols for evaluation were followed during the patient's care. COVID-19 was considered as part of the patient's evaluation. The plan for testing is: a test was obtained during this visit.     Diagnosis:    ICD-10-CM    1. Kidney stone  N20.0    2. Acute kidney injury (H)  N17.9    3. Constipation  K59.00        Scribe Disclosure:  I, Primitivo Yusuf, am serving as a scribe at 5:02 PM on 5/20/2021 to document services personally performed by Smooth Villa PA-C based on my observations and the provider's statements to me.     This record was created at least in part using electronic voice recognition software, so please excuse any typographical errors.       Smooth Villa PA-C  05/20/21 2057

## 2021-05-20 NOTE — ED TRIAGE NOTES
Pt presents for evaluation of uncontrolled pain related to dx kidney stone on the left with associated nausea. Dx on Sunday. Was given oxycodone (1300), tylenol (1200) and ibuprofen (0900), with minimal relief. Also given stool softeners, which he has been taking along with magnesium citrate. Feels constipated, last BM was Sunday.

## 2021-05-21 ENCOUNTER — ANESTHESIA (OUTPATIENT)
Dept: SURGERY | Facility: CLINIC | Age: 56
End: 2021-05-21
Payer: COMMERCIAL

## 2021-05-21 ENCOUNTER — ANESTHESIA EVENT (OUTPATIENT)
Dept: SURGERY | Facility: CLINIC | Age: 56
End: 2021-05-21
Payer: COMMERCIAL

## 2021-05-21 ENCOUNTER — PREP FOR PROCEDURE (OUTPATIENT)
Dept: UROLOGY | Facility: CLINIC | Age: 56
End: 2021-05-21

## 2021-05-21 ENCOUNTER — APPOINTMENT (OUTPATIENT)
Dept: GENERAL RADIOLOGY | Facility: CLINIC | Age: 56
End: 2021-05-21
Attending: INTERNAL MEDICINE
Payer: COMMERCIAL

## 2021-05-21 DIAGNOSIS — N20.1 LEFT URETERAL STONE: Primary | ICD-10-CM

## 2021-05-21 LAB
ANION GAP SERPL CALCULATED.3IONS-SCNC: 9 MMOL/L (ref 3–14)
BASOPHILS # BLD AUTO: 0.1 10E9/L (ref 0–0.2)
BASOPHILS NFR BLD AUTO: 0.4 %
BUN SERPL-MCNC: 49 MG/DL (ref 7–30)
CALCIUM SERPL-MCNC: 7.7 MG/DL (ref 8.5–10.1)
CHLORIDE SERPL-SCNC: 102 MMOL/L (ref 94–109)
CO2 SERPL-SCNC: 21 MMOL/L (ref 20–32)
CREAT SERPL-MCNC: 4.98 MG/DL (ref 0.66–1.25)
DIFFERENTIAL METHOD BLD: ABNORMAL
EOSINOPHIL # BLD AUTO: 0.1 10E9/L (ref 0–0.7)
EOSINOPHIL NFR BLD AUTO: 0.6 %
ERYTHROCYTE [DISTWIDTH] IN BLOOD BY AUTOMATED COUNT: 12 % (ref 10–15)
GFR SERPL CREATININE-BSD FRML MDRD: 12 ML/MIN/{1.73_M2}
GLUCOSE BLDC GLUCOMTR-MCNC: 136 MG/DL (ref 70–99)
GLUCOSE BLDC GLUCOMTR-MCNC: 145 MG/DL (ref 70–99)
GLUCOSE BLDC GLUCOMTR-MCNC: 193 MG/DL (ref 70–99)
GLUCOSE BLDC GLUCOMTR-MCNC: 194 MG/DL (ref 70–99)
GLUCOSE BLDC GLUCOMTR-MCNC: 212 MG/DL (ref 70–99)
GLUCOSE BLDC GLUCOMTR-MCNC: 214 MG/DL (ref 70–99)
GLUCOSE SERPL-MCNC: 149 MG/DL (ref 70–99)
HBA1C MFR BLD: 8.8 % (ref 0–5.6)
HCT VFR BLD AUTO: 33.6 % (ref 40–53)
HGB BLD-MCNC: 11.3 G/DL (ref 13.3–17.7)
IMM GRANULOCYTES # BLD: 0 10E9/L (ref 0–0.4)
IMM GRANULOCYTES NFR BLD: 0.4 %
LYMPHOCYTES # BLD AUTO: 1.1 10E9/L (ref 0.8–5.3)
LYMPHOCYTES NFR BLD AUTO: 9.5 %
MCH RBC QN AUTO: 29.1 PG (ref 26.5–33)
MCHC RBC AUTO-ENTMCNC: 33.6 G/DL (ref 31.5–36.5)
MCV RBC AUTO: 87 FL (ref 78–100)
MONOCYTES # BLD AUTO: 1.2 10E9/L (ref 0–1.3)
MONOCYTES NFR BLD AUTO: 10.7 %
NEUTROPHILS # BLD AUTO: 8.9 10E9/L (ref 1.6–8.3)
NEUTROPHILS NFR BLD AUTO: 78.4 %
NRBC # BLD AUTO: 0 10*3/UL
NRBC BLD AUTO-RTO: 0 /100
PLATELET # BLD AUTO: 128 10E9/L (ref 150–450)
POTASSIUM SERPL-SCNC: 4.5 MMOL/L (ref 3.4–5.3)
RBC # BLD AUTO: 3.88 10E12/L (ref 4.4–5.9)
SODIUM SERPL-SCNC: 132 MMOL/L (ref 133–144)
WBC # BLD AUTO: 11.3 10E9/L (ref 4–11)

## 2021-05-21 PROCEDURE — 250N000009 HC RX 250: Performed by: STUDENT IN AN ORGANIZED HEALTH CARE EDUCATION/TRAINING PROGRAM

## 2021-05-21 PROCEDURE — 52332 CYSTOSCOPY AND TREATMENT: CPT | Mod: LT | Performed by: STUDENT IN AN ORGANIZED HEALTH CARE EDUCATION/TRAINING PROGRAM

## 2021-05-21 PROCEDURE — 80048 BASIC METABOLIC PNL TOTAL CA: CPT | Performed by: INTERNAL MEDICINE

## 2021-05-21 PROCEDURE — 0T778DZ DILATION OF LEFT URETER WITH INTRALUMINAL DEVICE, VIA NATURAL OR ARTIFICIAL OPENING ENDOSCOPIC: ICD-10-PCS | Performed by: STUDENT IN AN ORGANIZED HEALTH CARE EDUCATION/TRAINING PROGRAM

## 2021-05-21 PROCEDURE — 250N000013 HC RX MED GY IP 250 OP 250 PS 637: Performed by: HOSPITALIST

## 2021-05-21 PROCEDURE — 255N000002 HC RX 255 OP 636: Performed by: STUDENT IN AN ORGANIZED HEALTH CARE EDUCATION/TRAINING PROGRAM

## 2021-05-21 PROCEDURE — 258N000003 HC RX IP 258 OP 636: Performed by: ANESTHESIOLOGY

## 2021-05-21 PROCEDURE — 85025 COMPLETE CBC W/AUTO DIFF WBC: CPT | Performed by: INTERNAL MEDICINE

## 2021-05-21 PROCEDURE — 52351 CYSTOURETERO & OR PYELOSCOPE: CPT | Mod: LT | Performed by: STUDENT IN AN ORGANIZED HEALTH CARE EDUCATION/TRAINING PROGRAM

## 2021-05-21 PROCEDURE — 250N000013 HC RX MED GY IP 250 OP 250 PS 637: Performed by: STUDENT IN AN ORGANIZED HEALTH CARE EDUCATION/TRAINING PROGRAM

## 2021-05-21 PROCEDURE — 99221 1ST HOSP IP/OBS SF/LOW 40: CPT | Mod: 25 | Performed by: STUDENT IN AN ORGANIZED HEALTH CARE EDUCATION/TRAINING PROGRAM

## 2021-05-21 PROCEDURE — 999N001017 HC STATISTIC GLUCOSE BY METER IP

## 2021-05-21 PROCEDURE — 999N000179 XR SURGERY CARM FLUORO LESS THAN 5 MIN W STILLS: Mod: TC

## 2021-05-21 PROCEDURE — 258N000003 HC RX IP 258 OP 636: Performed by: STUDENT IN AN ORGANIZED HEALTH CARE EDUCATION/TRAINING PROGRAM

## 2021-05-21 PROCEDURE — 250N000013 HC RX MED GY IP 250 OP 250 PS 637: Performed by: INTERNAL MEDICINE

## 2021-05-21 PROCEDURE — 74420 UROGRAPHY RTRGR +-KUB: CPT | Mod: 26 | Performed by: STUDENT IN AN ORGANIZED HEALTH CARE EDUCATION/TRAINING PROGRAM

## 2021-05-21 PROCEDURE — 250N000009 HC RX 250: Performed by: NURSE ANESTHETIST, CERTIFIED REGISTERED

## 2021-05-21 PROCEDURE — C2617 STENT, NON-COR, TEM W/O DEL: HCPCS | Performed by: STUDENT IN AN ORGANIZED HEALTH CARE EDUCATION/TRAINING PROGRAM

## 2021-05-21 PROCEDURE — 250N000011 HC RX IP 250 OP 636: Performed by: INTERNAL MEDICINE

## 2021-05-21 PROCEDURE — 250N000011 HC RX IP 250 OP 636: Performed by: NURSE ANESTHETIST, CERTIFIED REGISTERED

## 2021-05-21 PROCEDURE — 360N000084 HC SURGERY LEVEL 4 W/ FLUORO, PER MIN: Performed by: STUDENT IN AN ORGANIZED HEALTH CARE EDUCATION/TRAINING PROGRAM

## 2021-05-21 PROCEDURE — 36415 COLL VENOUS BLD VENIPUNCTURE: CPT | Performed by: INTERNAL MEDICINE

## 2021-05-21 PROCEDURE — 999N000141 HC STATISTIC PRE-PROCEDURE NURSING ASSESSMENT: Performed by: STUDENT IN AN ORGANIZED HEALTH CARE EDUCATION/TRAINING PROGRAM

## 2021-05-21 PROCEDURE — 250N000012 HC RX MED GY IP 250 OP 636 PS 637: Performed by: STUDENT IN AN ORGANIZED HEALTH CARE EDUCATION/TRAINING PROGRAM

## 2021-05-21 PROCEDURE — 272N000001 HC OR GENERAL SUPPLY STERILE: Performed by: STUDENT IN AN ORGANIZED HEALTH CARE EDUCATION/TRAINING PROGRAM

## 2021-05-21 PROCEDURE — 258N000001 HC RX 258: Performed by: STUDENT IN AN ORGANIZED HEALTH CARE EDUCATION/TRAINING PROGRAM

## 2021-05-21 PROCEDURE — 250N000011 HC RX IP 250 OP 636: Performed by: ANESTHESIOLOGY

## 2021-05-21 PROCEDURE — 710N000009 HC RECOVERY PHASE 1, LEVEL 1, PER MIN: Performed by: STUDENT IN AN ORGANIZED HEALTH CARE EDUCATION/TRAINING PROGRAM

## 2021-05-21 PROCEDURE — 120N000001 HC R&B MED SURG/OB

## 2021-05-21 PROCEDURE — 250N000009 HC RX 250

## 2021-05-21 PROCEDURE — C1769 GUIDE WIRE: HCPCS | Performed by: STUDENT IN AN ORGANIZED HEALTH CARE EDUCATION/TRAINING PROGRAM

## 2021-05-21 PROCEDURE — 99233 SBSQ HOSP IP/OBS HIGH 50: CPT | Performed by: INTERNAL MEDICINE

## 2021-05-21 PROCEDURE — 83036 HEMOGLOBIN GLYCOSYLATED A1C: CPT | Performed by: INTERNAL MEDICINE

## 2021-05-21 PROCEDURE — 370N000017 HC ANESTHESIA TECHNICAL FEE, PER MIN: Performed by: STUDENT IN AN ORGANIZED HEALTH CARE EDUCATION/TRAINING PROGRAM

## 2021-05-21 PROCEDURE — 250N000009 HC RX 250: Performed by: UROLOGY

## 2021-05-21 DEVICE — STENT URETERAL POLARIS ULTRA 6FRX26CM M0061921330
Type: IMPLANTABLE DEVICE | Site: ABDOMEN | Status: NON-FUNCTIONAL
Removed: 2021-06-03

## 2021-05-21 RX ORDER — PROPOFOL 10 MG/ML
INJECTION, EMULSION INTRAVENOUS PRN
Status: DISCONTINUED | OUTPATIENT
Start: 2021-05-21 | End: 2021-05-21

## 2021-05-21 RX ORDER — MEPERIDINE HYDROCHLORIDE 25 MG/ML
12.5 INJECTION INTRAMUSCULAR; INTRAVENOUS; SUBCUTANEOUS
Status: DISCONTINUED | OUTPATIENT
Start: 2021-05-21 | End: 2021-05-21

## 2021-05-21 RX ORDER — LIDOCAINE 40 MG/G
CREAM TOPICAL
Status: DISCONTINUED | OUTPATIENT
Start: 2021-05-21 | End: 2021-05-21 | Stop reason: HOSPADM

## 2021-05-21 RX ORDER — FENTANYL CITRATE 50 UG/ML
25-50 INJECTION, SOLUTION INTRAMUSCULAR; INTRAVENOUS
Status: DISCONTINUED | OUTPATIENT
Start: 2021-05-21 | End: 2021-05-21

## 2021-05-21 RX ORDER — CEFAZOLIN SODIUM 2 G/100ML
2 INJECTION, SOLUTION INTRAVENOUS
Status: DISCONTINUED | OUTPATIENT
Start: 2021-05-21 | End: 2021-05-21 | Stop reason: HOSPADM

## 2021-05-21 RX ORDER — CEFTRIAXONE 1 G/1
1 INJECTION, POWDER, FOR SOLUTION INTRAMUSCULAR; INTRAVENOUS EVERY 24 HOURS
Status: DISCONTINUED | OUTPATIENT
Start: 2021-05-21 | End: 2021-05-21

## 2021-05-21 RX ORDER — OXYBUTYNIN CHLORIDE 5 MG/1
5 TABLET ORAL 3 TIMES DAILY
Status: DISCONTINUED | OUTPATIENT
Start: 2021-05-21 | End: 2021-05-22 | Stop reason: HOSPADM

## 2021-05-21 RX ORDER — LIDOCAINE HYDROCHLORIDE 20 MG/ML
JELLY TOPICAL
Status: COMPLETED
Start: 2021-05-21 | End: 2021-05-21

## 2021-05-21 RX ORDER — DEXAMETHASONE SODIUM PHOSPHATE 4 MG/ML
INJECTION, SOLUTION INTRA-ARTICULAR; INTRALESIONAL; INTRAMUSCULAR; INTRAVENOUS; SOFT TISSUE PRN
Status: DISCONTINUED | OUTPATIENT
Start: 2021-05-21 | End: 2021-05-21

## 2021-05-21 RX ORDER — ASPIRIN 81 MG/1
81 TABLET ORAL DAILY
Status: DISCONTINUED | OUTPATIENT
Start: 2021-05-22 | End: 2021-05-22 | Stop reason: HOSPADM

## 2021-05-21 RX ORDER — ONDANSETRON 4 MG/1
4 TABLET, ORALLY DISINTEGRATING ORAL EVERY 30 MIN PRN
Status: DISCONTINUED | OUTPATIENT
Start: 2021-05-21 | End: 2021-05-21

## 2021-05-21 RX ORDER — PHENYLEPHRINE HYDROCHLORIDE 10 MG/ML
INJECTION INTRAVENOUS PRN
Status: DISCONTINUED | OUTPATIENT
Start: 2021-05-21 | End: 2021-05-21

## 2021-05-21 RX ORDER — ONDANSETRON 2 MG/ML
4 INJECTION INTRAMUSCULAR; INTRAVENOUS EVERY 30 MIN PRN
Status: DISCONTINUED | OUTPATIENT
Start: 2021-05-21 | End: 2021-05-21

## 2021-05-21 RX ORDER — ATROPA BELLADONNA AND OPIUM 16.2; 3 MG/1; MG/1
30 SUPPOSITORY RECTAL
Status: DISCONTINUED | OUTPATIENT
Start: 2021-05-21 | End: 2021-05-22 | Stop reason: HOSPADM

## 2021-05-21 RX ORDER — CEFAZOLIN SODIUM 2 G/100ML
2 INJECTION, SOLUTION INTRAVENOUS SEE ADMIN INSTRUCTIONS
Status: DISCONTINUED | OUTPATIENT
Start: 2021-05-21 | End: 2021-05-21 | Stop reason: HOSPADM

## 2021-05-21 RX ORDER — LIDOCAINE HYDROCHLORIDE 10 MG/ML
INJECTION, SOLUTION INFILTRATION; PERINEURAL PRN
Status: DISCONTINUED | OUTPATIENT
Start: 2021-05-21 | End: 2021-05-21

## 2021-05-21 RX ORDER — SODIUM CHLORIDE, SODIUM LACTATE, POTASSIUM CHLORIDE, CALCIUM CHLORIDE 600; 310; 30; 20 MG/100ML; MG/100ML; MG/100ML; MG/100ML
INJECTION, SOLUTION INTRAVENOUS CONTINUOUS
Status: DISCONTINUED | OUTPATIENT
Start: 2021-05-21 | End: 2021-05-21

## 2021-05-21 RX ORDER — SODIUM CHLORIDE, SODIUM LACTATE, POTASSIUM CHLORIDE, CALCIUM CHLORIDE 600; 310; 30; 20 MG/100ML; MG/100ML; MG/100ML; MG/100ML
INJECTION, SOLUTION INTRAVENOUS CONTINUOUS
Status: DISCONTINUED | OUTPATIENT
Start: 2021-05-21 | End: 2021-05-21 | Stop reason: HOSPADM

## 2021-05-21 RX ORDER — ATROPA BELLADONNA AND OPIUM 16.2; 3 MG/1; MG/1
SUPPOSITORY RECTAL PRN
Status: DISCONTINUED | OUTPATIENT
Start: 2021-05-21 | End: 2021-05-21 | Stop reason: HOSPADM

## 2021-05-21 RX ORDER — LIDOCAINE HYDROCHLORIDE 20 MG/ML
JELLY TOPICAL ONCE
Status: COMPLETED | OUTPATIENT
Start: 2021-05-21 | End: 2021-05-21

## 2021-05-21 RX ORDER — FENTANYL CITRATE 50 UG/ML
INJECTION, SOLUTION INTRAMUSCULAR; INTRAVENOUS PRN
Status: DISCONTINUED | OUTPATIENT
Start: 2021-05-21 | End: 2021-05-21

## 2021-05-21 RX ORDER — ONDANSETRON 2 MG/ML
INJECTION INTRAMUSCULAR; INTRAVENOUS PRN
Status: DISCONTINUED | OUTPATIENT
Start: 2021-05-21 | End: 2021-05-21

## 2021-05-21 RX ORDER — HYDROXYZINE HYDROCHLORIDE 25 MG/1
25 TABLET, FILM COATED ORAL EVERY 6 HOURS PRN
Status: DISCONTINUED | OUTPATIENT
Start: 2021-05-21 | End: 2021-05-22 | Stop reason: HOSPADM

## 2021-05-21 RX ORDER — LABETALOL HYDROCHLORIDE 5 MG/ML
10 INJECTION, SOLUTION INTRAVENOUS EVERY 5 MIN PRN
Status: DISCONTINUED | OUTPATIENT
Start: 2021-05-21 | End: 2021-05-21

## 2021-05-21 RX ADMIN — MIDAZOLAM 2 MG: 1 INJECTION INTRAMUSCULAR; INTRAVENOUS at 09:20

## 2021-05-21 RX ADMIN — ROSUVASTATIN CALCIUM 40 MG: 20 TABLET, FILM COATED ORAL at 19:01

## 2021-05-21 RX ADMIN — FENTANYL CITRATE 50 MCG: 50 INJECTION, SOLUTION INTRAMUSCULAR; INTRAVENOUS at 09:25

## 2021-05-21 RX ADMIN — CEFTRIAXONE 1 G: 1 INJECTION, POWDER, FOR SOLUTION INTRAMUSCULAR; INTRAVENOUS at 08:22

## 2021-05-21 RX ADMIN — HYDROMORPHONE HYDROCHLORIDE 0.5 MG: 1 INJECTION, SOLUTION INTRAMUSCULAR; INTRAVENOUS; SUBCUTANEOUS at 03:12

## 2021-05-21 RX ADMIN — HYDROXYZINE HYDROCHLORIDE 25 MG: 25 TABLET, FILM COATED ORAL at 21:26

## 2021-05-21 RX ADMIN — FENTANYL CITRATE 50 MCG: 50 INJECTION, SOLUTION INTRAMUSCULAR; INTRAVENOUS at 10:50

## 2021-05-21 RX ADMIN — SODIUM CHLORIDE, POTASSIUM CHLORIDE, SODIUM LACTATE AND CALCIUM CHLORIDE: 600; 310; 30; 20 INJECTION, SOLUTION INTRAVENOUS at 09:22

## 2021-05-21 RX ADMIN — OMEPRAZOLE 20 MG: 20 CAPSULE, DELAYED RELEASE ORAL at 21:26

## 2021-05-21 RX ADMIN — FENTANYL CITRATE 50 MCG: 50 INJECTION, SOLUTION INTRAMUSCULAR; INTRAVENOUS at 10:19

## 2021-05-21 RX ADMIN — PHENYLEPHRINE HYDROCHLORIDE 100 MCG: 10 INJECTION INTRAVENOUS at 09:33

## 2021-05-21 RX ADMIN — PHENYLEPHRINE HYDROCHLORIDE 100 MCG: 10 INJECTION INTRAVENOUS at 09:35

## 2021-05-21 RX ADMIN — OXYBUTYNIN CHLORIDE 5 MG: 5 TABLET ORAL at 21:26

## 2021-05-21 RX ADMIN — OXYCODONE HYDROCHLORIDE 5 MG: 5 TABLET ORAL at 04:23

## 2021-05-21 RX ADMIN — INSULIN ASPART 2 UNITS: 100 INJECTION, SOLUTION INTRAVENOUS; SUBCUTANEOUS at 19:05

## 2021-05-21 RX ADMIN — TAMSULOSIN HYDROCHLORIDE 0.4 MG: 0.4 CAPSULE ORAL at 19:01

## 2021-05-21 RX ADMIN — HYDROMORPHONE HYDROCHLORIDE 0.5 MG: 1 INJECTION, SOLUTION INTRAMUSCULAR; INTRAVENOUS; SUBCUTANEOUS at 07:49

## 2021-05-21 RX ADMIN — OXYBUTYNIN CHLORIDE 5 MG: 5 TABLET ORAL at 12:44

## 2021-05-21 RX ADMIN — FENTANYL CITRATE 50 MCG: 50 INJECTION, SOLUTION INTRAMUSCULAR; INTRAVENOUS at 10:41

## 2021-05-21 RX ADMIN — INSULIN ASPART 2 UNITS: 100 INJECTION, SOLUTION INTRAVENOUS; SUBCUTANEOUS at 13:00

## 2021-05-21 RX ADMIN — LIDOCAINE HYDROCHLORIDE: 20 JELLY TOPICAL at 19:09

## 2021-05-21 RX ADMIN — ONDANSETRON 4 MG: 2 INJECTION INTRAMUSCULAR; INTRAVENOUS at 03:18

## 2021-05-21 RX ADMIN — LIDOCAINE HYDROCHLORIDE: 20 JELLY TOPICAL at 19:10

## 2021-05-21 RX ADMIN — HYDROMORPHONE HYDROCHLORIDE 0.5 MG: 1 INJECTION, SOLUTION INTRAMUSCULAR; INTRAVENOUS; SUBCUTANEOUS at 00:54

## 2021-05-21 RX ADMIN — OXYCODONE HYDROCHLORIDE 5 MG: 5 TABLET ORAL at 18:19

## 2021-05-21 RX ADMIN — LIDOCAINE HYDROCHLORIDE 50 MG: 10 INJECTION, SOLUTION INFILTRATION; PERINEURAL at 09:25

## 2021-05-21 RX ADMIN — HYDROMORPHONE HYDROCHLORIDE 0.5 MG: 1 INJECTION, SOLUTION INTRAMUSCULAR; INTRAVENOUS; SUBCUTANEOUS at 10:22

## 2021-05-21 RX ADMIN — AMLODIPINE BESYLATE 5 MG: 5 TABLET ORAL at 08:22

## 2021-05-21 RX ADMIN — HYDROMORPHONE HYDROCHLORIDE 0.5 MG: 1 INJECTION, SOLUTION INTRAMUSCULAR; INTRAVENOUS; SUBCUTANEOUS at 05:45

## 2021-05-21 RX ADMIN — FENTANYL CITRATE 50 MCG: 50 INJECTION, SOLUTION INTRAMUSCULAR; INTRAVENOUS at 09:37

## 2021-05-21 RX ADMIN — PROPOFOL 200 MG: 10 INJECTION, EMULSION INTRAVENOUS at 09:25

## 2021-05-21 RX ADMIN — SODIUM CHLORIDE, POTASSIUM CHLORIDE, SODIUM LACTATE AND CALCIUM CHLORIDE: 600; 310; 30; 20 INJECTION, SOLUTION INTRAVENOUS at 11:39

## 2021-05-21 RX ADMIN — ONDANSETRON HYDROCHLORIDE 4 MG: 2 INJECTION, SOLUTION INTRAVENOUS at 09:39

## 2021-05-21 RX ADMIN — DEXAMETHASONE SODIUM PHOSPHATE 4 MG: 4 INJECTION, SOLUTION INTRA-ARTICULAR; INTRALESIONAL; INTRAMUSCULAR; INTRAVENOUS; SOFT TISSUE at 09:26

## 2021-05-21 RX ADMIN — FENTANYL CITRATE 50 MCG: 50 INJECTION, SOLUTION INTRAMUSCULAR; INTRAVENOUS at 10:13

## 2021-05-21 ASSESSMENT — ACTIVITIES OF DAILY LIVING (ADL)
ADLS_ACUITY_SCORE: 10

## 2021-05-21 NOTE — ANESTHESIA CARE TRANSFER NOTE
Patient: Ashutosh Moraes    Procedure(s):  cystoscopy, left retrograde pyelogram, left ureteroscopy and left ureteral stent placement    Diagnosis: Kidney stone [N20.0]  Acute kidney injury (H) [N17.9]  Diagnosis Additional Information: No value filed.    Anesthesia Type:   General     Note:    Oropharynx: oropharynx clear of all foreign objects and spontaneously breathing  Level of Consciousness: drowsy  Oxygen Supplementation: face mask  Level of Supplemental Oxygen (L/min / FiO2): 6  Independent Airway: airway patency satisfactory and stable  Dentition: dentition unchanged  Vital Signs Stable: post-procedure vital signs reviewed and stable  Report to RN Given: handoff report given  Patient transferred to: PACU    Handoff Report: Identifed the Patient, Identified the Reponsible Provider, Reviewed the pertinent medical history, Discussed the surgical course, Reviewed Intra-OP anesthesia mangement and issues during anesthesia, Set expectations for post-procedure period and Allowed opportunity for questions and acknowledgement of understanding      Vitals: (Last set prior to Anesthesia Care Transfer)  CRNA VITALS  5/21/2021 0924 - 5/21/2021 0959      5/21/2021             Pulse:  102    SpO2:  97 %    Resp Rate (observed):  8        Electronically Signed By: RIN Espinoza CRNA  May 21, 2021  9:59 AM

## 2021-05-21 NOTE — UTILIZATION REVIEW
Admission Status; Secondary Review Determination       Under the authority of the Utilization Management Committee, the utilization review process indicated a secondary review on the above patient. The review outcome is based on review of the medical records, discussions with staff, and applying clinical experience noted on the date of the review.     (x) Inpatient Status Appropriate - This patient's medical care is consistent with medical management for inpatient care and reasonable inpatient medical practice.     RATIONALE FOR DETERMINATION   55 year old male with past medical history of coronary artery disease, type 2 diabetes, hypertension, nephrolithiasis, GONZALO who was seen in the ER on 5/16 for left flank pain and found to have an obstructing proximal left ureter stone with mild hydronephrosis who was discharged home but had progressive left flank pain and was subsequently admitted on 5/20/2021 with persistent left ureteral stone, hydronephrosis and acute on chronic kidney disease.   Baseline creatinine is 1.6-2.2 admission creatinine is 5.39.  The presence of obstructive uropathy in a patient with multiple comorbidities and the level of acute kidney injury makes his care very high risk for outpatient management and expected to require more than 24 hours of complex hospital care including surgical intervention for obstruction and stent placement.    The expected length of stay at the time of admission was more than 2 nights because of the severity of illness, intensity of service provided, and risk for adverse outcome. Inpatient admission is appropriate.     This document was produced using voice recognition software       The information on this document is developed by the utilization review team in order for the business office to ensure compliance. This only denotes the appropriateness of proper admission status and does not reflect the quality of care rendered.   The definitions of Inpatient Status and  Observation Status used in making the determination above are those provided in the CMS Coverage Manual, Chapter 1 and Chapter 6, section 70.4.   Sincerely,   LAMONT HAILE MD   System Medical Director   Utilization Management   [facility].

## 2021-05-21 NOTE — ANESTHESIA POSTPROCEDURE EVALUATION
Patient: Ashutosh Moraes    Procedure(s):  cystoscopy, left retrograde pyelogram, left ureteroscopy and left ureteral stent placement    Diagnosis:Kidney stone [N20.0]  Acute kidney injury (H) [N17.9]  Diagnosis Additional Information: No value filed.    Anesthesia Type:  General    Note:  Disposition: Outpatient   Postop Pain Control: Uneventful            Sign Out: Well controlled pain   PONV: No   Neuro/Psych: Uneventful            Sign Out: Acceptable/Baseline neuro status   Airway/Respiratory: Uneventful            Sign Out: Acceptable/Baseline resp. status   CV/Hemodynamics: Uneventful            Sign Out: Acceptable CV status   Other NRE: NONE   DID A NON-ROUTINE EVENT OCCUR? No           Last vitals:  Vitals:    05/21/21 1105 05/21/21 1115 05/21/21 1135   BP: (!) 142/83  (!) 151/84   Pulse: 71  85   Resp:   18   Temp:  98.4  F (36.9  C) 97.5  F (36.4  C)   SpO2: 95% 96% 90%       Last vitals prior to Anesthesia Care Transfer:  CRNA VITALS  5/21/2021 0924 - 5/21/2021 1024      5/21/2021             Pulse:  102    SpO2:  97 %    Resp Rate (observed):  8          Electronically Signed By: Guy Mcbride MD  May 21, 2021  11:51 AM

## 2021-05-21 NOTE — ANESTHESIA PREPROCEDURE EVALUATION
Anesthesia Pre-Procedure Evaluation    Patient: Ashutosh Moraes   MRN: [id number] : 1965        Preoperative Diagnosis: Kidney stone [N20.0]  Acute kidney injury (H) [N17.9]   Procedure : Procedure(s):  cystoscopy, left retrograde pyelogram and left ureteral stent placement, possible left ureteroscopy with laser lithotripsy and stone basketing     Past Medical History:   Diagnosis Date     CAD (coronary artery disease)     Cardiac cath 2009: [facility]ANA to [facility]     Diabetes (H)     Type 2     Gastro-oesophageal reflux disease      HLD (hyperlipidemia)      Hypertension      Kidney stones      Sleep apnea     Doesn't use a CPAP     Stented coronary artery       Past Surgical History:   Procedure Laterality Date     APPENDECTOMY       COLONOSCOPY       COMBINED CYSTOSCOPY, RETROGRADES, URETEROSCOPY, LASER HOLMIUM LITHOTRIPSY URETER(S), INSERT STENT Right 2021    Procedure: Cystoscopy, right ureteroscopy with laser lithotripsy, right ureteroscopy with stone basketing, right retrograde pyelogram, right ureteral stent placement, fluoroscopic interpretation <1 hour physician time;  Surgeon: Hudson Montana MD;  Location: [facility]     HEART CATH LEFT HEART CATH  2009    [facility]ANA to [facility]      Allergies   Allergen Reactions     Atorvastatin Rash      Social History     Tobacco Use     Smoking status: Never Smoker     Smokeless tobacco: Never Used   Substance Use Topics     Alcohol use: No     Alcohol/week: 0.0 standard drinks      Wt Readings from Last 1 Encounters:   21 115.6 kg (254 lb 12.8 oz)        Anesthesia Evaluation   Pt has had prior anesthetic. Type: General.    No history of anesthetic complications       ROS/MED HX  ENT/Pulmonary:     (+) sleep apnea, uses CPAP,     Neurologic:  - neg neurologic ROS     Cardiovascular:     (+) hypertension--CAD --stent-Drug Eluting Stent.     METS/Exercise Tolerance:     Hematologic:  - neg hematologic  ROS     Musculoskeletal:  - neg musculoskeletal ROS      GI/Hepatic: Comment: No GERD on empty stomach    (+) GERD, Asymptomatic on medication,     Renal/Genitourinary:     (+) Nephrolithiasis ,     Endo:     (+) type II DM, Obesity,     Psychiatric/Substance Use:  - neg psychiatric ROS     Infectious Disease:  - neg infectious disease ROS     Malignancy:  - neg malignancy ROS     Other:  - neg other ROS          Physical Exam    Airway        Mallampati: II   TM distance: > 3 FB      Respiratory Devices and Support         Dental  no notable dental history         Cardiovascular   cardiovascular exam normal          Pulmonary   pulmonary exam normal                OUTSIDE LABS:  CBC:   Lab Results   Component Value Date    WBC 11.3 (H) 05/21/2021    WBC 11.5 (H) 05/20/2021    HGB 11.3 (L) 05/21/2021    HGB 12.3 (L) 05/20/2021    HCT 33.6 (L) 05/21/2021    HCT 35.6 (L) 05/20/2021     (L) 05/21/2021     (L) 05/20/2021     BMP:   Lab Results   Component Value Date     (L) 05/21/2021     (L) 05/20/2021    POTASSIUM 4.5 05/21/2021    POTASSIUM 3.6 05/20/2021    CHLORIDE 102 05/21/2021    CHLORIDE 97 05/20/2021    CO2 21 05/21/2021    CO2 24 05/20/2021    BUN 49 (H) 05/21/2021    BUN 53 (H) 05/20/2021    CR 4.98 (H) 05/21/2021    CR 5.39 (H) 05/20/2021     (H) 05/21/2021     (H) 05/20/2021     COAGS:   Lab Results   Component Value Date    PTT 42 (H) 07/02/2009    INR 1.01 07/07/2009     POC:   Lab Results   Component Value Date     (H) 05/21/2021     HEPATIC:   Lab Results   Component Value Date    ALBUMIN 4.0 07/08/2009    PROTTOTAL 7.0 07/08/2009    ALT <5 (L) 05/11/2018    AST 68 (H) 07/08/2009    ALKPHOS 76 07/08/2009    BILITOTAL 0.3 07/08/2009     OTHER:   Lab Results   Component Value Date    A1C 7.3 (H) 06/21/2020    KATIANA 7.7 (L) 05/21/2021    CRP 10.1 (H) 03/02/2014    SED 6 03/02/2014       Anesthesia Plan    ASA Status:  3      Anesthesia Type: General.     - Airway: LMA   Induction: Inhalational.   Maintenance:  Balanced.        Consents            Postoperative Care    Pain management: IV analgesics, Oral pain medications, Multi-modal analgesia.   PONV prophylaxis: Ondansetron (or other 5HT-3), Dexamethasone or Solumedrol     Comments:                Guy Mcbride MD

## 2021-05-21 NOTE — ED NOTES
Pt has sleep apnea. O2 levels dropping to 85% when falling asleep. Put pt on 2L nasal canula-maintaining 97%

## 2021-05-21 NOTE — ED PROVIDER NOTES
Emergency Department Attending Supervision Note  5/20/2021  10:39 PM    I evaluated this patient in conjunction with Smooth Villa PA-C    Briefly, the patient presented with  Left sided flank pain. Recent ED visit revealed a left proximal ureter 4mm kidney stone. Patient has had ongoing pain, nausea and vomiting despite taking medications at home and re-presented for evaluation. No fever.    On my exam, patient is resting comfortably. No increased work of breathing or respiratory distress. Abdomen is soft nontender, nondistended without guarding or rebound.     Results:  All ED results reviewed by myself.  BUN 53, Creatinine 5.39, Na 131, WBC 11.5, Hgb 12.3, Plt 148  UA: 5 WBC, >182 RBC, Nitrite Neg    CT A/P:  IMPRESSION:   1.  Minimal distal migration of previously identified 5 x 3 mm proximal left ureteral stone. Mild left hydronephrosis persists with some increase in soft tissue stranding surrounding left ureter.  2.  Bilateral kidney stones remain present.  Per radiology    ED course:  Patient seen and examined with Smooth Villa PAc. Patient with persistent renal colic from left 4mm ureteral stone. Has been taking large amounts of NSAIDs for pain control and creatinine is increased to 5.39. No evidence of infection/sepsis. Patient treated with analgesics and IVF. Urology consulted by Smooth Villa. Patient to be admitted for further monitoring, evaluation, and treatment.     My impression is 1. Left ureterolithaisis 2. Acute renal insufficiency     Diagnosis    ICD-10-CM    1. Kidney stone  N20.0 Asymptomatic SARS-CoV-2 COVID-19 Virus (Coronavirus) by PCR     Case Request: cystoscopy, left retrograde pyelogram and left ureteral stent placement, possible left ureteroscopy with laser lithotripsy and stone basketing     Case Request: cystoscopy, left retrograde pyelogram and left ureteral stent placement, possible left ureteroscopy with laser lithotripsy and stone basketing     Glucose by meter     Glucose by meter    2. Acute kidney injury (H)  N17.9 Case Request: cystoscopy, left retrograde pyelogram and left ureteral stent placement, possible left ureteroscopy with laser lithotripsy and stone basketing     Case Request: cystoscopy, left retrograde pyelogram and left ureteral stent placement, possible left ureteroscopy with laser lithotripsy and stone basketing   3. Constipation  K59.00      Scribe Disclosure:  I, Hudson Gray, am serving as a scribe at 10:21 PM on 5/20/2021 to document services personally performed by Smooth Mariscal MD based on my observations and the provider's statements to me.            Smooth Mariscal MD  05/21/21 2970

## 2021-05-21 NOTE — ANESTHESIA PROCEDURE NOTES
Airway       Patient location during procedure: OR  Staff -        Performed By: CRNA  Consent for Airway        Urgency: elective  Indications and Patient Condition       Indications for airway management: ivy-procedural       Induction type:intravenous       Mask difficulty assessment: 2 - vent by mask + OA or adjuvant +/- NMBA (OAW #9)    Final Airway Details       Final airway type: supraglottic airway    Supraglottic Airway Details        Type: LMA       Brand: I-Gel       LMA size: 5    Post intubation assessment        Placement verified by: capnometry, equal breath sounds and chest rise        Number of attempts at approach: 1       Secured with: plastic tape       Ease of procedure: easy       Dentition: Intact and Unchanged

## 2021-05-21 NOTE — PROVIDER NOTIFICATION
"Spoke with Dr. Brenner.  Pt reports feeling uncomfortable with the fortune catheter. States \"I just always have the sensation that I have to pee.\" Pt was started on ditropan earlier this afternoon. Instructed to leave fortune until tomorrow if possible as that would be ideal, but ok to remove if patient is adamant about removal.   "

## 2021-05-21 NOTE — PLAN OF CARE
RN [id number]  Pt arrived to the floor at 2200. Ambulated to bed ind. Oriented to room, admission completed. CC L flank pain, dilaudid IV Q2H x4, oxy 5 mg x1, and heating pad for pain relief. Intermittent nausea, relieved by zofran. BS hypoactive, last BM 5/16.  and 145.    Plan for urology consult today followed by cystoscopy scheduled for 0920 with Dr. Montana.

## 2021-05-21 NOTE — OP NOTE
[facility]  Operative Note    Pre-operative diagnosis: Kidney stone [N20.0]  Acute kidney injury (H) [N17.9]   Post-operative diagnosis Left ureteral stone  SHELLI   Procedure: Procedure(s):  cystoscopy, left retrograde pyelogram, left ureteroscopy and left ureteral stent placement   Surgeon: Hudson Montana MD   Assistants(s): none   Anesthesia: General    Estimated blood loss: 1 mL    Total IV fluids: (See anesthesia record)   Blood transfusion: No transfusion was given during surgery   Total urine output: Not measured   Drains: Left ureteral stent, 18 Fr Trent   Specimens: none   Implants: None   Findings: Minimal left hydronephrosis on retrograde pyelogram, tight distal and mid left ureter, stent placed.   Complications: None.   Condition: Stable       Description of procedure:  54 yo M with long history of nephrolithiasis (both calcium oxalate and uric acid) and multiple prior stone surgery most recently right ureteroscopy in January 2021, now presenting with obstructing left proximal ureteral stone with associated acute kidney injury on CKD. I discussed left stent placement with attempted ureteroscopy with laser lithotripsy and stone basketing. Risks including ureteral injury, bleeding, infection, need for secondary procedures discussed. Informed consent obtained    Patient was brought to operating room #14.  A weight and renal appropriate dose of ceftriaxone antibiotics had already been given prior to the procedure.  General anesthesia was induced, he was placed in dorsal lithotomy position, prepped and draped in standard sterile fashion.  A timeout was performed.    22 English Storz cystoscope was assembled and passed to the urethra into the bladder.  The prostatic urethra was short with mild bilobar hypertrophy with a slightly elevated bladder neck.  The bladder was entered and was mildly trabeculated with no cellules and no diverticuli.  There were no concerning bladder urothelial  "lesions.  There were scattered punctate bladder stones which appeared to be composed of uric acid.  The left ureteral orifice was identified.  This was cannulated with a 5 [place] tiger tail catheter with the assistance of a 0.035 inch stiff shaft Glidewire.  The wire was removed and a gentle retrograde pyelogram was performed which showed mild to minimal hydronephrosis.  There was no obvious shadowing radiodensity on the  film.  The wire was replaced up through the catheter to the renal pelvis and the tiger tail catheter was removed.  The cystoscope was removed and the wire was clipped to the drapes as a safety wire.  A long semirigid ureteroscope was assembled and passed through the urethra, into the bladder and was attempted to be passed up the left ureteral orifice however the left UO was too tight.  A second 0.035 inch stiff shaft Glidewire was passed through the scope up the left ureter under fluoroscopic guidance and the scope was backloaded off of the wire.  The the scope was then passed back through the urethra into the bladder and passed between the wires in a \"railroad\" fashion.  I was able to get the scope through the distal ureter up to the midureter at the pelvic brim however at this point the ureter became the to narrow to pass more proximally. I did not visualize the ureteral stone. Decision was made to place a stent and return for interval ureteroscopy.  The ureteroscope was removed.  One wire was removed.  A stent was then placed in the usual fashion over the remaining wire under cystoscopic and fluoroscopic guidance with good coils noted proximally and distally.  Bladder was drained and scope was removed.  A belladonna and opium suppository was placed per rectum.  Digital rectal examination under anesthesia revealed a 30 g prostate, anodular, benign feeling.  A Trent catheter was then placed atraumatically and left to gravity drainage.  Patient was cleaned up, awoken from anesthesia and " brought to PACU in stable condition.  He will remain admitted under observation to ensure that his renal function recovers.  He will then return in 1 to 2 weeks for ureteroscopy and laser lithotripsy.    Hudson Montana MD   [facility]  [phone] clinic phone

## 2021-05-21 NOTE — ED NOTES
[facility]  ED Nurse Handoff Report    Ashutosh Moraes is a 55 year old male   ED Chief complaint: Flank Pain  . ED Diagnosis:   Final diagnoses:   None     Allergies:   Allergies   Allergen Reactions     Atorvastatin Rash       Code Status: Full Code  Activity level - Baseline/Home:  Independent. Activity Level - Current:   Independent. Lift room needed: No. Bariatric: No   Needed: No   Isolation: No. Infection: Not Applicable.     Vital Signs:   Vitals:    05/20/21 1830 05/20/21 1845 05/20/21 1900 05/20/21 1931   BP: 136/82 136/88  (!) 168/108   Pulse: 50 79  89   Resp:    20   Temp:       TempSrc:       SpO2: 92% 91% 97% 97%   Weight:       Height:           Cardiac Rhythm:  ,      Pain level:    Patient confused: No. Patient Falls Risk: Yes.   Elimination Status: Has voided   Patient Report - Initial Complaint: Flank Pain. Focused Assessment: Ashutosh Moraes is a 55 year old male with a history of kidney stones, Type II Diabetes, hypertension, hyperlipidemia, and CAD who presents for evaluation of left-sided flank pain. Ashutosh was seen recently on 05/16/21 with Dr. Markham for left-sided flank pain with associated nausea and vomiting. Labs were obtained and CT imaging of a 0.4 cm stone in the proximal left ureter is seen below. Today, he returns and complains of constipation for four days ago and increasing left flank pain which radiates to his back. He had a bout of emesis due to increasing nausea this week but that has resolved today. He denies any changes to his urinary symptoms. He drove back from [place] this afternoon and was not able to take an oxycodone. He states ibuprofen is the only medication he has taken which reduces his pain symptoms. The pain exacerbates at night prior to bed. He denies fever, chills, or nausea here.   Tests Performed: Labs, CT. Abnormal Results:   Labs Ordered and Resulted from Time of ED Arrival Up to the Time of Departure from the ED   CBC WITH  PLATELETS DIFFERENTIAL - Abnormal; Notable for the following components:       Result Value    WBC 11.5 (*)     RBC Count 4.19 (*)     Hemoglobin 12.3 (*)     Hematocrit 35.6 (*)     Platelet Count 148 (*)     Absolute Neutrophil 8.8 (*)     All other components within normal limits   BASIC METABOLIC PANEL - Abnormal; Notable for the following components:    Sodium 131 (*)     Glucose 144 (*)     Urea Nitrogen 53 (*)     Creatinine 5.39 (*)     GFR Estimate 11 (*)     GFR Estimate If Black 13 (*)     Calcium 8.2 (*)     All other components within normal limits   ROUTINE UA WITH MICROSCOPIC - Abnormal; Notable for the following components:    Blood Urine Large (*)     Protein Albumin Urine 20 (*)     RBC Urine >182 (*)     Mucous Urine Present (*)     All other components within normal limits   PERIPHERAL IV CATHETER     CT Abdomen Pelvis w/o Contrast   Final Result   IMPRESSION:    1.  Minimal distal migration of previously identified 5 x 3 mm proximal left ureteral stone. Mild left hydronephrosis persists with some increase in soft tissue stranding surrounding left ureter.   2.  Bilateral kidney stones remain present.         .   Treatments provided: See MAR (Pain control)  Family Comments: not present  OBS brochure/video discussed/provided to patient:  N/A  ED Medications:   Medications   ondansetron (ZOFRAN) injection 4 mg (4 mg Intravenous Given 5/20/21 1708)   HYDROmorphone (PF) (DILAUDID) injection 0.5 mg (0.5 mg Intravenous Given 5/20/21 1902)   HYDROmorphone (PF) (DILAUDID) injection 0.5 mg (0.5 mg Intravenous Given 5/20/21 1929)     Drips infusing:  No  For the majority of the shift, the patient's behavior Green. Interventions performed were NA.    Sepsis treatment initiated: No     Patient tested for COVID 19 prior to admission: YES    ED Nurse Name/Phone Number: Radha Carlos RN,   8:06 PM    RECEIVING UNIT ED HANDOFF REVIEW    Above ED Nurse Handoff Report was reviewed: Yes  Reviewed by: Dinorah GU  RADAMES Lindo on May 20, 2021 at 9:37 PM

## 2021-05-21 NOTE — PHARMACY-ADMISSION MEDICATION HISTORY
Admission medication history interview status for this patient is complete. See [facility] admission navigator for allergy information, prior to admission medications and immunization status.     Medication history interview done, indicate source(s): Patient  Medication history resources (including written lists, pill bottles, clinic record):None  Pharmacy: [facility] [id number] [facility] [id number] [address]    Changes made to PTA medication list:  Added: none  Deleted: docusate  Changed: none    Actions taken by pharmacist (provider contacted, etc):None   Additional medication history information: patient reports its been a couple days since he took any of his home meds d/t nausea  Medication reconciliation/reorder completed by provider prior to medication history? no    Prior to Admission medications    Medication Sig Last Dose Taking? Auth Provider   acetaminophen (TYLENOL) 500 MG tablet Take 2 tablets (1,000 mg) by mouth every 6 hours as needed for mild pain Past Week at Unknown time Yes Hudson Montana MD   aspirin 81 MG EC tablet Take 81 mg by mouth daily  Past Week at Unknown time Yes Reported, Patient   ibuprofen (ADVIL/MOTRIN) 800 MG tablet Take 1 tablet (800 mg) by mouth every 6 hours as needed for pain Past Month at Unknown time Yes Hudson Montana MD   lisinopril-hydrochlorothiazide (ZESTORETIC) 20-12.5 MG tablet Take 1 tablet by mouth daily  Past Week at Unknown time Yes Reported, Patient   metFORMIN (GLUCOPHAGE-XR) 500 MG 24 hr tablet Take 500 mg by mouth daily (with dinner) Past Week at Unknown time Yes Unknown, Entered By History   omeprazole (PRILOSEC) 20 MG DR capsule Take 20 mg by mouth daily Past Week at Unknown time Yes Unknown, Entered By History   ondansetron (ZOFRAN ODT) 4 MG ODT tab Take 1 tablet (4 mg) by mouth every 6 hours as needed for nausea or vomiting Past Month at Unknown time Yes Masoud Markham MD   oxyCODONE (ROXICODONE) 5 MG tablet Take 1 tablet (5 mg) by mouth every  6 hours as needed for pain Past Week at Unknown time Yes Masoud Markham MD   rosuvastatin (CRESTOR) 40 MG tablet Take 40 mg by mouth daily Past Week at Unknown time Yes Unknown, Entered By History   SENNA-docusate sodium (SENNA S) 8.6-50 MG tablet Take 1-2 tablets by mouth 2 times daily as needed (if taking oxycodone) Past Week at Unknown time Yes Masoud Markham MD   tamsulosin (FLOMAX) 0.4 MG capsule Take 1 capsule (0.4 mg) by mouth daily for 10 doses Past Week at Unknown time Yes Masoud Markham MD

## 2021-05-21 NOTE — PROGRESS NOTES
[facility]  Hospitalist Progress Note  Mouna Gasca MD 05/21/21  Text Page  Pager: [phone] (7am-6pm)    Reason for Stay (Diagnosis): Left ureteral stone, acute kidney injury         Assessment and Plan:      Summary of Stay: Ashutosh Moraes is a 55 year old male with past medical history of coronary artery disease, type 2 diabetes, hypertension, nephrolithiasis, GONZALO who was seen in the ER on 5/16 for left flank pain and found to have an obstructing proximal left ureter stone with mild hydronephrosis who was discharged home but had progressive left flank pain and was subsequently admitted on 5/20/2021 with persistent left ureteral stone, hydronephrosis and acute on chronic kidney disease.  Patient will go to the OR today with urology.    Problem List/Assessment and Plan:     1.  Obstructing left ureteral stone with left hydronephrosis: He does have prior history of kidney stones requiring right ureteral stent placement in 1/2021.  Currently complains of left flank pain but denies fever, dysuria, urgency.  CT of the abdomen/pelvis done showed 5 x 3 mm proximal left ureteral stone with mild hydronephrosis.  Has SHELLI, see below for further discussion.  UA with only 5 white cells.  No indication for antibiotics at this time.  -Urology consulted, planning for OR later this morning  -N.p.o. for surgery  -Continue Flomax  -Pain control with Tylenol, oxycodone, IV Dilaudid     2.  Acute on chronic kidney injury: Baseline creatinine around 1.6-2.2. Creatinine 5.39 on admission, has improved slightly this morning to 4.98.  He had been taking ibuprofen 800 mg 3 times daily for approximately 48 hours prior to this presentation due to pain from his stone.  I think that his acute kidney injury is due to obstructive uropathy, potentially ATN as well as dehydration.  He is having stent placement with urology today.  Continue IV fluids but needs to remain hospitalized today to ensure that his renal function is  improving after stone removal.  -Increase normal saline to 150 cc/h  -Stent placement with urology today  -Avoid nephrotoxic agents including NSAIDs  -Hold Zestoretic for now given acute kidney injury  -Hold Metformin until renal function improves  -BMP in the morning  -Check FeNa     3.  Stable CAD status post stent to OM 2009: Currently has no chest pain.  Will hold aspirin for planned procedure today.  Will resume rosuvastatin.       4.  Diabetes mellitus type 2: Prior to admission on metformin.  Hemoglobin A1c was 7.3 in 6/2020.  HgbA1c this admission elevated at 8.8.  -Hold Metformin until renal function improves  -Medium sliding scale insulin     5.  Hyperlipidemia: We will resume rosuvastatin     6.  GERD: We will resume omeprazole     7.  Hypertension: PTA on lisinopril/hydrochlorothiazide which will be held for now given acute kidney injury.  He is hypertensive so started on amlodipine until renal function improves at which point he could resume his PTA lisinopril/hydrochlorothiazide.     Diet: NPO per Anesthesia Guidelines for Procedure/Surgery Except for: Meds    DVT Prophylaxis: Pneumatic Compression Devices  Trent Catheter: not present  Code Status: Full Code      Disposition Plan   Expected discharge: Tomorrow, recommended to prior living arrangement once renal function improved.  Entered: Mouna Gasca MD 05/21/2021, 8:19 AM       The patient's care was discussed with the Bedside Nurse, Patient and Patient's Family.    Hospitalist Service  [facility]          Interval History (Subjective):      Patient was seen with his bedside nurse as well as his wife.  He states that he continues to have left flank pain.  Has required pain medications which helped slightly and also is using heat which he thinks actually helps more.  He is urinating.  No nausea, vomiting, chest pain or shortness of breath.  He understands he will be having surgery with urology later this morning.  I also  "discussed that his kidney function is slightly improved but not improved enough to be able to discharge home today.  All of his and his wife's questions were answered.                  Physical Exam:      Last Vital Signs:  BP (!) 148/90   Pulse 78   Temp 97.7  F (36.5  C) (Temporal)   Resp 16   Ht 1.727 m (5' 8\")   Wt 115.6 kg (254 lb 12.8 oz)   SpO2 97%   BMI 38.74 kg/m      General: Alert, awake, no acute distress. Sitting up in a chair.  HEENT: Normocephalic and atraumatic, eyes anicteric and without scleral injection, EOMI, face symmetric, MMM.  Cardiac: RRR, normal S1, S2. No m/g/r, no LE edema.  Pulmonary: Normal chest rise, normal work of breathing.  Lungs CTAB without crackles or wheezing.  Abdomen: soft, non-tender, non-distended.  Normoactive bowel sounds, no guarding or rebound tenderness.  Extremities: no deformities.  Warm, well perfused.  Skin: no rashes or lesions.  Warm and Dry.  Neuro: No focal deficits.  Speech clear.  Coordination and strength grossly normal.  Psych: Alert and oriented x3. Appropriate affect.         Medications:      All current medications were reviewed with changes reflected in problem list.         Data:      All new lab and imaging data was reviewed.   Labs:  Recent Labs   Lab 05/21/21  0650 05/20/21  1656 05/16/21  0953   * 131* 134   POTASSIUM 4.5 3.6 4.0   CHLORIDE 102 97 102   CO2 21 24 28   ANIONGAP 9 10 4   * 144* 245*   BUN 49* 53* 27   CR 4.98* 5.39* 2.05*   GFRESTIMATED 12* 11* 36*   GFRESTBLACK 14* 13* 42*   KATIANA 7.7* 8.2* 8.5     Recent Labs   Lab 05/21/21  0650 05/20/21  1656 05/16/21  0953   WBC 11.3* 11.5* 7.4   HGB 11.3* 12.3* 13.7   HCT 33.6* 35.6* 40.7   MCV 87 85 87   * 148* 153      Imaging:   Recent Results (from the past 24 hour(s))   CT Abdomen Pelvis w/o Contrast    Narrative    EXAM: CT ABDOMEN PELVIS W/O CONTRAST  LOCATION: [facility]  DATE/TIME: 5/20/2021 5:51 PM    INDICATION: Flank pain, kidney stone " suspected  Bowel obstruction suspected  COMPARISON: 5/16/2021  TECHNIQUE: CT scan of the abdomen and pelvis was performed without IV contrast. Multiplanar reformats were obtained. Dose reduction techniques were used.  CONTRAST: None.    FINDINGS:   LOWER CHEST: Linear discoid atelectasis or scar. Small hiatal hernia.    HEPATOBILIARY: Normal.    PANCREAS: Normal.    SPLEEN: Normal.    ADRENAL GLANDS: Normal.    KIDNEYS/BLADDER: Previously identified 5 x 3 mm proximal left ureteral stone has moved distally very slightly, approximately 1-2 cm. There is new mild soft tissue stranding along the length of the ureter. There is mild left hydronephrosis. 4 additional   left-sided calculi are present within left kidney.    Right kidney unchanged with a dominant 8 mm lower pole stone but no right hydronephrosis    Bladder remains negative..    BOWEL: No obstruction or inflammatory change.    LYMPH NODES: Normal.    VASCULATURE: Unremarkable.    PELVIC ORGANS: Normal.    MUSCULOSKELETAL: Normal.      Impression    IMPRESSION:   1.  Minimal distal migration of previously identified 5 x 3 mm proximal left ureteral stone. Mild left hydronephrosis persists with some increase in soft tissue stranding surrounding left ureter.  2.  Bilateral kidney stones remain present.         Mouna Gasca MD

## 2021-05-21 NOTE — PLAN OF CARE
Pt arrived to the floor at 11:30.  Pt alert and oriented.  Pt has a fortune cath.  Pts output is clear and cherry in color.  Pt complains that the fortune is uncomfortable.  Pt was given ditropan . Pt is tolerating diet.

## 2021-05-21 NOTE — CONSULTS
[facility] Urology Consultation    Ashutosh Moraes MRN# [id number]   Age: 55 year old YOB: 1965     Date of Admission:  5/20/2021    Reason for consult: Urolithiasis       Requesting physician: Yoli Witt MD       Level of consult: Consult, follow and place orders           Assessment and Plan:   Assessment:   56 yo M with long history of nephrolithiasis (both calcium oxalate and uric acid) and multiple prior stone surgery most recently right ureteroscopy in January 2021, now presenting with obstructing left proximal ureteral stone with associated acute kidney injury on CKD. I discussed left stent placement with attempted ureteroscopy with laser lithotripsy and stone basketing. Risks including ureteral injury, bleeding, infection, need for secondary procedures discussed. Informed consent obtained    The patient has an enlarging right lower pole stone with relatively low attenuation <400 HU. Likely this represents uric acid stone. Patient should be started on urinary alkalinization once his renal function improves (potassium citrate). Given his CKD, I recommend he sees nephrology to manage this as I want to avoid metabolic derangements      Plan:   - left stent placed, unable to access stone so will need interval ureteroscopy in 1-2 weeks for definitive treatment  - Trent left in place, leave in place until SCr improves  - recommend nephrology consultation for medical management of kidney stones, urinary alkalinization with potassium citrate (can be done as outpatient after discharge)    Hudson Montana MD   [facility] Urology  [phone] clinic phone               Chief Complaint:   urolithiasis     History is obtained from the patient    56 yo M with long history of nephrolithiasis (both calcium oxalate and uric acid) and multiple prior stone surgery most recently right ureteroscopy in January 2021, now presenting with obstructing left proximal ureteral stone with associated  acute kidney injury on CKD.    Initially presented 5/16/2021 with left flank pain, found to have left proximal ureteral stone, discharged on medical expulsive therapy. Returned yesterday with persistent pain, found to have minimal migration of the stone distally, but now with severe SHELLI on CKD. No fever or chills. No urgency, frequency or dysuria.           Past Medical History:     I have reviewed this patient's past medical history  Past Medical History:   Diagnosis Date     CAD (coronary artery disease)     Cardiac cath 7/2009: RAMANA to OM     Diabetes (H)     Type 2     Gastro-oesophageal reflux disease      HLD (hyperlipidemia)      Hypertension      Kidney stones      Sleep apnea     Doesn't use a CPAP     Stented coronary artery 2009             Past Surgical History:     I have reviewed this patient's past surgical history  Past Surgical History:   Procedure Laterality Date     APPENDECTOMY       COLONOSCOPY  2020     COMBINED CYSTOSCOPY, RETROGRADES, URETEROSCOPY, LASER HOLMIUM LITHOTRIPSY URETER(S), INSERT STENT Right 1/21/2021    Procedure: Cystoscopy, right ureteroscopy with laser lithotripsy, right ureteroscopy with stone basketing, right retrograde pyelogram, right ureteral stent placement, fluoroscopic interpretation <1 hour physician time;  Surgeon: Hudson Montana MD;  Location: [facility] OR     HEART CATH LEFT HEART CATH  07/02/2009    RAMANA to OM             Social History:     Social History     Tobacco Use     Smoking status: Never Smoker     Smokeless tobacco: Never Used   Substance Use Topics     Alcohol use: No     Alcohol/week: 0.0 standard drinks             Family History:     Family History   Problem Relation Age of Onset     Asthma Mother      Coronary Artery Disease Father      Family history not discussed          Immunizations:     There is no immunization history on file for this patient.          Allergies:     Allergies   Allergen Reactions     Atorvastatin Rash             Medications:      Current Facility-Administered Medications   Medication     acetaminophen (TYLENOL) tablet 650 mg     amLODIPine (NORVASC) tablet 5 mg     [START ON 5/22/2021] aspirin EC tablet 81 mg     glucose gel 15-30 g    Or     dextrose 50 % injection 25-50 mL    Or     glucagon injection 1 mg     fentaNYL (PF) (SUBLIMAZE) injection 25-50 mcg     fentaNYL (PF) (SUBLIMAZE) injection 25-50 mcg     HYDROmorphone (DILAUDID) injection 0.3-0.5 mg     HYDROmorphone (PF) (DILAUDID) injection 0.3-0.5 mg     insulin aspart (NovoLOG) injection (RAPID ACTING)     insulin aspart (NovoLOG) injection (RAPID ACTING)     labetalol (NORMODYNE/TRANDATE) injection 10 mg     lactated ringers infusion     lidocaine (LMX4) cream     lidocaine 1 % 0.1-1 mL     melatonin tablet 1 mg     meperidine (DEMEROL) injection 12.5 mg     naloxone (NARCAN) injection 0.2 mg    Or     naloxone (NARCAN) injection 0.4 mg    Or     naloxone (NARCAN) injection 0.2 mg    Or     naloxone (NARCAN) injection 0.4 mg     omeprazole (priLOSEC) CR capsule 20 mg     ondansetron (ZOFRAN-ODT) ODT tab 4 mg    Or     ondansetron (ZOFRAN) injection 4 mg     ondansetron (ZOFRAN-ODT) ODT tab 4 mg    Or     ondansetron (ZOFRAN) injection 4 mg     ORAL Pain Medications - may administer as ordered by surgeon for take home use     oxybutynin (DITROPAN) tablet 5 mg     oxyCODONE (ROXICODONE) tablet 5 mg     prochlorperazine (COMPAZINE) injection 10 mg     rosuvastatin (CRESTOR) tablet 40 mg     SENNA-docusate sodium (SENNA S) 8.6-50 MG TABS 1-2 tablet     sodium chloride (PF) 0.9% PF flush 3 mL     sodium chloride (PF) 0.9% PF flush 3 mL     sodium chloride 0.9% infusion     tamsulosin (FLOMAX) capsule 0.4 mg             Review of Systems:   The Review of Systems is negative other than noted in the HPI          Physical Exam:   Vitals were reviewed  Temp: 97.5  F (36.4  C) Temp src: Temporal BP: (!) 151/84 Pulse: 85   Resp: 18 SpO2: 90 % O2 Device: Nasal cannula Oxygen Delivery: 2  LPM  Constitutional:   nad   Eyes:   No scleral icterus   ENT:   Nc/at   Neck:   No neck mass   Hematologic / Lymphatic:   No bleed/bruise   Back:   Not examined   Lungs:   nonlabored breathing on room air   Cardiovascular:   extrem wwp   Abdomen:   abd obese soft   Chest / Breast:   Not examined   Genitounirinary:   deferred   Musculoskeletal:   No joint swelling or redness   Neurologic:   Awake, alert   Neuropsychiatric:   Normal mood and affect   Skin:   No rash             Data:   All laboratory and imaging data in the past 24 hours reviewed  Results for orders placed or performed during the hospital encounter of 05/20/21 (from the past 24 hour(s))   CBC with platelets differential   Result Value Ref Range    WBC 11.5 (H) 4.0 - 11.0 10e9/L    RBC Count 4.19 (L) 4.4 - 5.9 10e12/L    Hemoglobin 12.3 (L) 13.3 - 17.7 g/dL    Hematocrit 35.6 (L) 40.0 - 53.0 %    MCV 85 78 - 100 fl    MCH 29.4 26.5 - 33.0 pg    MCHC 34.6 31.5 - 36.5 g/dL    RDW 12.0 10.0 - 15.0 %    Platelet Count 148 (L) 150 - 450 10e9/L    % Neutrophils 77.0 %    % Lymphocytes 11.0 %    % Monocytes 10.0 %    % Eosinophils 2.0 %    Absolute Neutrophil 8.8 (H) 1.6 - 8.3 10e9/L    Absolute Lymphocytes 1.3 0.8 - 5.3 10e9/L    Absolute Monocytes 1.2 0.0 - 1.3 10e9/L    Absolute Eosinophils 0.2 0.0 - 0.7 10e9/L    Diff Method Automated Method    Basic metabolic panel   Result Value Ref Range    Sodium 131 (L) 133 - 144 mmol/L    Potassium 3.6 3.4 - 5.3 mmol/L    Chloride 97 94 - 109 mmol/L    Carbon Dioxide 24 20 - 32 mmol/L    Anion Gap 10 3 - 14 mmol/L    Glucose 144 (H) 70 - 99 mg/dL    Urea Nitrogen 53 (H) 7 - 30 mg/dL    Creatinine 5.39 (H) 0.66 - 1.25 mg/dL    GFR Estimate 11 (L) >60 mL/min/[1.73_m2]    GFR Estimate If Black 13 (L) >60 mL/min/[1.73_m2]    Calcium 8.2 (L) 8.5 - 10.1 mg/dL   UA with Microscopic   Result Value Ref Range    Color Urine Straw     Appearance Urine Clear     Glucose Urine Negative NEG^Negative mg/dL    Bilirubin Urine  Negative NEG^Negative    Ketones Urine Negative NEG^Negative mg/dL    Specific Gravity Urine 1.011 1.003 - 1.035    Blood Urine Large (A) NEG^Negative    pH Urine 5.5 5.0 - 7.0 pH    Protein Albumin Urine 20 (A) NEG^Negative mg/dL    Urobilinogen mg/dL Normal 0.0 - 2.0 mg/dL    Nitrite Urine Negative NEG^Negative    Leukocyte Esterase Urine Negative NEG^Negative    Source Midstream Urine     WBC Urine 5 0 - 5 /HPF    RBC Urine >182 (H) 0 - 2 /HPF    Squamous Epithelial /HPF Urine <1 0 - 1 /HPF    Mucous Urine Present (A) NEG^Negative /LPF   CT Abdomen Pelvis w/o Contrast    Narrative    EXAM: CT ABDOMEN PELVIS W/O CONTRAST  LOCATION: [facility]  DATE/TIME: 5/20/2021 5:51 PM    INDICATION: Flank pain, kidney stone suspected  Bowel obstruction suspected  COMPARISON: 5/16/2021  TECHNIQUE: CT scan of the abdomen and pelvis was performed without IV contrast. Multiplanar reformats were obtained. Dose reduction techniques were used.  CONTRAST: None.    FINDINGS:   LOWER CHEST: Linear discoid atelectasis or scar. Small hiatal hernia.    HEPATOBILIARY: Normal.    PANCREAS: Normal.    SPLEEN: Normal.    ADRENAL GLANDS: Normal.    KIDNEYS/BLADDER: Previously identified 5 x 3 mm proximal left ureteral stone has moved distally very slightly, approximately 1-2 cm. There is new mild soft tissue stranding along the length of the ureter. There is mild left hydronephrosis. 4 additional   left-sided calculi are present within left kidney.    Right kidney unchanged with a dominant 8 mm lower pole stone but no right hydronephrosis    Bladder remains negative..    BOWEL: No obstruction or inflammatory change.    LYMPH NODES: Normal.    VASCULATURE: Unremarkable.    PELVIC ORGANS: Normal.    MUSCULOSKELETAL: Normal.      Impression    IMPRESSION:   1.  Minimal distal migration of previously identified 5 x 3 mm proximal left ureteral stone. Mild left hydronephrosis persists with some increase in soft tissue stranding  surrounding left ureter.  2.  Bilateral kidney stones remain present.     Asymptomatic SARS-CoV-2 COVID-19 Virus (Coronavirus) by PCR    Specimen: Nasopharyngeal   Result Value Ref Range    SARS-CoV-2 Virus Specimen Source Nasopharyngeal     SARS-CoV-2 PCR Result NEGATIVE     SARS-CoV-2 PCR Comment (Note)    Glucose by meter   Result Value Ref Range    Glucose 131 (H) 70 - 99 mg/dL   Glucose by meter   Result Value Ref Range    Glucose 145 (H) 70 - 99 mg/dL   Basic metabolic panel   Result Value Ref Range    Sodium 132 (L) 133 - 144 mmol/L    Potassium 4.5 3.4 - 5.3 mmol/L    Chloride 102 94 - 109 mmol/L    Carbon Dioxide 21 20 - 32 mmol/L    Anion Gap 9 3 - 14 mmol/L    Glucose 149 (H) 70 - 99 mg/dL    Urea Nitrogen 49 (H) 7 - 30 mg/dL    Creatinine 4.98 (H) 0.66 - 1.25 mg/dL    GFR Estimate 12 (L) >60 mL/min/[1.73_m2]    GFR Estimate If Black 14 (L) >60 mL/min/[1.73_m2]    Calcium 7.7 (L) 8.5 - 10.1 mg/dL   CBC with platelets differential   Result Value Ref Range    WBC 11.3 (H) 4.0 - 11.0 10e9/L    RBC Count 3.88 (L) 4.4 - 5.9 10e12/L    Hemoglobin 11.3 (L) 13.3 - 17.7 g/dL    Hematocrit 33.6 (L) 40.0 - 53.0 %    MCV 87 78 - 100 fl    MCH 29.1 26.5 - 33.0 pg    MCHC 33.6 31.5 - 36.5 g/dL    RDW 12.0 10.0 - 15.0 %    Platelet Count 128 (L) 150 - 450 10e9/L    Diff Method Automated Method     % Neutrophils 78.4 %    % Lymphocytes 9.5 %    % Monocytes 10.7 %    % Eosinophils 0.6 %    % Basophils 0.4 %    % Immature Granulocytes 0.4 %    Nucleated RBCs 0 0 /100    Absolute Neutrophil 8.9 (H) 1.6 - 8.3 10e9/L    Absolute Lymphocytes 1.1 0.8 - 5.3 10e9/L    Absolute Monocytes 1.2 0.0 - 1.3 10e9/L    Absolute Eosinophils 0.1 0.0 - 0.7 10e9/L    Absolute Basophils 0.1 0.0 - 0.2 10e9/L    Abs Immature Granulocytes 0.0 0 - 0.4 10e9/L    Absolute Nucleated RBC 0.0    Hemoglobin A1c   Result Value Ref Range    Hemoglobin A1C 8.8 (H) 0 - 5.6 %   XR Surgery ARNULFO L/T 5 Min Fluoro w Stills    Narrative    This exam was marked as  non-reportable because it will not be read by a   radiologist or a [place] non-radiologist provider.         Glucose by meter   Result Value Ref Range    Glucose 136 (H) 70 - 99 mg/dL     All imaging studies reviewed by me.    CT abd/pelvis 5/20/2021    Left proximal ureteral stone with additional left lower pole nephrolithiasis     Attestation:  I have reviewed today's vital signs, notes, medications, labs and imaging.  Amount of time performed on this consult: 45 minutes.    Hudson Montana MD

## 2021-05-21 NOTE — H&P
[facility]    History and Physical  Hospitalist       Date of Admission:  5/20/2021  Date of Service (when I saw the patient): 05/20/21    Assessment & Plan   Ashutosh Moraes is a 55 year old male patient with past medical history of coronary artery disease status post RAMANA to OM in 2009, diabetes mellitus type 2, GERD, hyperlipidemia, hypertension, kidney stones, obstructive sleep apnea, came to emergency room with a complaint of left flank pain. He stated that he developed left flank pain 5 days ago.  He was seen on 5/16/2021 in the ER for left flank pain.  CT of the abdomen/pelvis was done at that time and was found to have 0.4 cm obstructing stone in the proximal left ureter with mild hydronephrosis.  Patient continued to have worsening left flank pain and came back to emergency room today.  CT of the abdomen/pelvis was repeated and showed minimal distal migration of previously identified 5 x 3 mm proximal left ureteral stone.  Lab work-up showed sodium 131, potassium 3.6, creatinine 5.39, WBC 11.5, hemoglobin 12.3.  Urinalysis showed WBC 5/hpf, RBC greater than 182/hpf.  Urology was consulted from emergency room and recommended to keep patient n.p.o. after midnight for stent placement tomorrow.  Patient was admitted for further management.    1.  Obstructing left ureteral stone with left hydronephrosis  He does have prior history of kidney stones requiring right ureteral stent placement in 1/2021.  Currently complains of left flank pain but denies fever, dysuria, urgency.    CT of the abdomen/pelvis done today and revealed 5 x 3 mm proximal left ureteral stone with mild hydronephrosis.  We will put him on IV fluid and IV Dilaudid for pain.  Will consult urology for possible stent placement tomorrow. Will monitor vital signs.  Will resume tamsulosin    2.  Acute on chronic kidney injury likely due to obstructive uropathy  Baseline creatinine around 1.6-2.2. Creatinine 5.39 today.    3.   Stable CAD status post stent to OM 2009: Currently has no chest pain.  Will hold aspirin for planned procedure tomorrow.  Will resume rosuvastatin    4.  Diabetes mellitus type 2, not on insulin  We will hold Metformin.  Will put him on insulin sliding scale.  Will monitor blood sugar.    5.  Hyperlipidemia: We will resume rosuvastatin    6.  GERD: We will resume omeprazole    7.  Hypertension: Blood pressure on the high side.  We will hold lisinopril/hydrochlorothiazide because of worsening renal function with ureteral obstruction. We will put him on amlodipine 5 mg daily.  Will monitor blood pressure    DVT Prophylaxis: Pneumatic Compression Devices  Code Status: Full Code    Disposition: Expected discharge in 1-2 days    Yoli Witt MD    Primary Care Physician   Kathy Chandra    Chief Complaint   Left flank pain    History is obtained from the patient    History of Present Illness   Ashutosh Moraes is a 55 year old male patient with past medical history of coronary artery disease status post RAMANA to OM in 2009, diabetes mellitus type 2, GERD, hyperlipidemia, hypertension, kidney stones, obstructive sleep apnea, came to emergency room with a complaint of left flank pain. He stated that he developed left flank pain 5 days ago.  He was seen on 5/16/2021 in the ER for left flank pain. He had CT of the abdomen which showed 0.4 cm obstructing stone in the proximal left ureter with mild hydronephrosis, several nonobstructing stones in both kidneys.  Patient was discharged home with a plan to follow-up as outpatient.  He states that he continued to have left flank pain.  He had associated vomiting.  He came back to emergency room because of worsening left flank pain.  He denies fever.  He has no dysuria, urgency or frequency.  Patient stated that he had right ureteral stone in January of this year requiring stent placement.  On arrival to emergency room, his vital signs were checked and showed temperature 98, pulse 88,  blood pressure 159/94, oxygen saturation 100% on room air.  Laboratory work-up showed sodium 131, potassium 3.6, creatinine 5.39, WBC 11.5, hemoglobin 12.3.  Urinalysis showed WBC 5/hpf, RBC greater than 182/hpf.  Repeat CT of the abdomen/pelvis was done today and showed minimal distal migration of previously identified 5 x 3 mm proximal left ureteral stone. Mild left hydronephrosis persists with some increase in soft tissue stranding surrounding left ureter, bilateral kidney stones.  Dr. Samano of urology was consulted from emergency room and recommended to keep him n.p.o. after midnight for stent placement tomorrow.  Patient was admitted to the hospital for further management.    Past Medical History    I have reviewed this patient's medical history and updated it with pertinent information if needed.   Past Medical History:   Diagnosis Date     CAD (coronary artery disease)     Cardiac cath 7/2009: RAMANA to OM     Diabetes (H)     Type 2     Gastro-oesophageal reflux disease      HLD (hyperlipidemia)      Hypertension      Kidney stones      Sleep apnea     Doesn't use a CPAP     Stented coronary artery 2009       Past Surgical History   I have reviewed this patient's surgical history and updated it with pertinent information if needed.  Past Surgical History:   Procedure Laterality Date     APPENDECTOMY       COLONOSCOPY  2020     COMBINED CYSTOSCOPY, RETROGRADES, URETEROSCOPY, LASER HOLMIUM LITHOTRIPSY URETER(S), INSERT STENT Right 1/21/2021    Procedure: Cystoscopy, right ureteroscopy with laser lithotripsy, right ureteroscopy with stone basketing, right retrograde pyelogram, right ureteral stent placement, fluoroscopic interpretation <1 hour physician time;  Surgeon: Hudson Montana MD;  Location: [facility] OR     HEART CATH LEFT HEART CATH  07/02/2009    RAMANA to OM       Prior to Admission Medications   Prior to Admission Medications   Prescriptions Last Dose Informant Patient Reported? Taking?   SENNA-docusate  sodium (SENNA S) 8.6-50 MG tablet   No No   Sig: Take 1-2 tablets by mouth 2 times daily as needed (if taking oxycodone)   acetaminophen (TYLENOL) 500 MG tablet   No No   Sig: Take 2 tablets (1,000 mg) by mouth every 6 hours as needed for mild pain   Patient not taking: Reported on 4/9/2021   aspirin 81 MG EC tablet   Yes No   Sig: Take 81 mg by mouth daily    docusate sodium (COLACE) 100 MG capsule   No No   Sig: Take 1 capsule (100 mg) by mouth 2 times daily   Patient not taking: Reported on 4/9/2021   ibuprofen (ADVIL/MOTRIN) 800 MG tablet   No No   Sig: Take 1 tablet (800 mg) by mouth every 6 hours as needed for pain   Patient not taking: Reported on 4/9/2021   lisinopril-hydrochlorothiazide (ZESTORETIC) 20-12.5 MG tablet   Yes No   Sig: Take 1 tablet by mouth daily    metFORMIN (GLUCOPHAGE-XR) 500 MG 24 hr tablet   Yes No   Sig: Take 500 mg by mouth daily (with dinner)   omeprazole (PRILOSEC) 20 MG DR capsule   Yes No   Sig: Take 20 mg by mouth daily   ondansetron (ZOFRAN ODT) 4 MG ODT tab   No No   Sig: Take 1 tablet (4 mg) by mouth every 6 hours as needed for nausea or vomiting   oxyCODONE (ROXICODONE) 5 MG tablet   No No   Sig: Take 1 tablet (5 mg) by mouth every 6 hours as needed for pain   rosuvastatin (CRESTOR) 40 MG tablet   Yes No   Sig: Take 40 mg by mouth daily   tamsulosin (FLOMAX) 0.4 MG capsule   No No   Sig: Take 1 capsule (0.4 mg) by mouth daily for 10 doses      Facility-Administered Medications: None     Allergies   Allergies   Allergen Reactions     Atorvastatin Rash       Social History   I have reviewed this patient's social history and updated it with pertinent information if needed. Ashutosh Moraes  reports that he has never smoked. He has never used smokeless tobacco. He reports that he does not drink alcohol or use drugs.    Family History   I have reviewed this patient's family history and updated it with pertinent information if needed.   Family History   Problem Relation Age of Onset      Asthma Mother      Coronary Artery Disease Father        Review of Systems   The 10 point Review of Systems is negative other than noted in the HPI or here. Left flank pain    Physical Exam   Temp: 98  F (36.7  C) Temp src: Oral BP: (!) 166/95 Pulse: 93   Resp: 20 SpO2: 98 % O2 Device: Nasal cannula    Vital Signs with Ranges  Temp:  [98  F (36.7  C)] 98  F (36.7  C)  Pulse:  [50-93] 93  Resp:  [20] 20  BP: ()/() 166/95  SpO2:  [91 %-100 %] 98 %  250 lbs 0 oz    GEN:  Alert, oriented x 3, appears comfortable, NAD.  HEENT:  Normocephalic/atraumatic, no scleral icterus, no nasal discharge, mouth moist.  CV:  Regular rate and rhythm, no murmur or JVD.  S1 + S2 noted, no S3 or S4.  LUNGS:  Clear to auscultation bilaterally without rales/rhonchi/wheezing/retractions.  Symmetric chest rise on inhalation noted.  ABD:  Active bowel sounds, soft, non-tender/non-distended.  No rebound/guarding/rigidity.  EXT:  No edema or cyanosis.  Hands/feet warm to touch with good signs of peripheral perfusion.  No joint synovitis noted.  SKIN:  Dry to touch, no exanthems noted in the visualized areas.  NEURO:  Symmetric muscle strength, sensation to touch grossly intact.  No new focal deficits appreciated.    Data   Data reviewed today:  I personally reviewed  Recent Labs   Lab 05/20/21  1656 05/16/21  0953   WBC 11.5* 7.4   HGB 12.3* 13.7   MCV 85 87   * 153   * 134   POTASSIUM 3.6 4.0   CHLORIDE 97 102   CO2 24 28   BUN 53* 27   CR 5.39* 2.05*   ANIONGAP 10 4   KATIAAN 8.2* 8.5   * 245*       Recent Results (from the past 24 hour(s))   CT Abdomen Pelvis w/o Contrast    Narrative    EXAM: CT ABDOMEN PELVIS W/O CONTRAST  LOCATION: [facility]  DATE/TIME: 5/20/2021 5:51 PM    INDICATION: Flank pain, kidney stone suspected  Bowel obstruction suspected  COMPARISON: 5/16/2021  TECHNIQUE: CT scan of the abdomen and pelvis was performed without IV contrast. Multiplanar reformats were obtained. Dose  reduction techniques were used.  CONTRAST: None.    FINDINGS:   LOWER CHEST: Linear discoid atelectasis or scar. Small hiatal hernia.    HEPATOBILIARY: Normal.    PANCREAS: Normal.    SPLEEN: Normal.    ADRENAL GLANDS: Normal.    KIDNEYS/BLADDER: Previously identified 5 x 3 mm proximal left ureteral stone has moved distally very slightly, approximately 1-2 cm. There is new mild soft tissue stranding along the length of the ureter. There is mild left hydronephrosis. 4 additional   left-sided calculi are present within left kidney.    Right kidney unchanged with a dominant 8 mm lower pole stone but no right hydronephrosis    Bladder remains negative..    BOWEL: No obstruction or inflammatory change.    LYMPH NODES: Normal.    VASCULATURE: Unremarkable.    PELVIC ORGANS: Normal.    MUSCULOSKELETAL: Normal.      Impression    IMPRESSION:   1.  Minimal distal migration of previously identified 5 x 3 mm proximal left ureteral stone. Mild left hydronephrosis persists with some increase in soft tissue stranding surrounding left ureter.  2.  Bilateral kidney stones remain present.

## 2021-05-22 VITALS
HEART RATE: 75 BPM | TEMPERATURE: 97.2 F | DIASTOLIC BLOOD PRESSURE: 78 MMHG | HEIGHT: 68 IN | OXYGEN SATURATION: 96 % | WEIGHT: 254.8 LBS | BODY MASS INDEX: 38.62 KG/M2 | SYSTOLIC BLOOD PRESSURE: 143 MMHG | RESPIRATION RATE: 18 BRPM

## 2021-05-22 LAB
ANION GAP SERPL CALCULATED.3IONS-SCNC: 7 MMOL/L (ref 3–14)
BUN SERPL-MCNC: 50 MG/DL (ref 7–30)
CALCIUM SERPL-MCNC: 7.8 MG/DL (ref 8.5–10.1)
CHLORIDE SERPL-SCNC: 106 MMOL/L (ref 94–109)
CO2 SERPL-SCNC: 22 MMOL/L (ref 20–32)
CREAT SERPL-MCNC: 3.56 MG/DL (ref 0.66–1.25)
ERYTHROCYTE [DISTWIDTH] IN BLOOD BY AUTOMATED COUNT: 12.3 % (ref 10–15)
GFR SERPL CREATININE-BSD FRML MDRD: 18 ML/MIN/{1.73_M2}
GLUCOSE BLDC GLUCOMTR-MCNC: 203 MG/DL (ref 70–99)
GLUCOSE BLDC GLUCOMTR-MCNC: 213 MG/DL (ref 70–99)
GLUCOSE BLDC GLUCOMTR-MCNC: 223 MG/DL (ref 70–99)
GLUCOSE SERPL-MCNC: 175 MG/DL (ref 70–99)
HCT VFR BLD AUTO: 33.2 % (ref 40–53)
HGB BLD-MCNC: 10.9 G/DL (ref 13.3–17.7)
MCH RBC QN AUTO: 28.6 PG (ref 26.5–33)
MCHC RBC AUTO-ENTMCNC: 32.8 G/DL (ref 31.5–36.5)
MCV RBC AUTO: 87 FL (ref 78–100)
PLATELET # BLD AUTO: 139 10E9/L (ref 150–450)
POTASSIUM SERPL-SCNC: 4.6 MMOL/L (ref 3.4–5.3)
RBC # BLD AUTO: 3.81 10E12/L (ref 4.4–5.9)
SODIUM SERPL-SCNC: 135 MMOL/L (ref 133–144)
WBC # BLD AUTO: 6.5 10E9/L (ref 4–11)

## 2021-05-22 PROCEDURE — 258N000003 HC RX IP 258 OP 636: Performed by: STUDENT IN AN ORGANIZED HEALTH CARE EDUCATION/TRAINING PROGRAM

## 2021-05-22 PROCEDURE — 99239 HOSP IP/OBS DSCHRG MGMT >30: CPT | Performed by: INTERNAL MEDICINE

## 2021-05-22 PROCEDURE — 80048 BASIC METABOLIC PNL TOTAL CA: CPT | Performed by: STUDENT IN AN ORGANIZED HEALTH CARE EDUCATION/TRAINING PROGRAM

## 2021-05-22 PROCEDURE — 250N000013 HC RX MED GY IP 250 OP 250 PS 637: Performed by: STUDENT IN AN ORGANIZED HEALTH CARE EDUCATION/TRAINING PROGRAM

## 2021-05-22 PROCEDURE — 85027 COMPLETE CBC AUTOMATED: CPT | Performed by: STUDENT IN AN ORGANIZED HEALTH CARE EDUCATION/TRAINING PROGRAM

## 2021-05-22 PROCEDURE — 36415 COLL VENOUS BLD VENIPUNCTURE: CPT | Performed by: STUDENT IN AN ORGANIZED HEALTH CARE EDUCATION/TRAINING PROGRAM

## 2021-05-22 PROCEDURE — 99232 SBSQ HOSP IP/OBS MODERATE 35: CPT | Performed by: UROLOGY

## 2021-05-22 PROCEDURE — 250N000013 HC RX MED GY IP 250 OP 250 PS 637: Performed by: INTERNAL MEDICINE

## 2021-05-22 PROCEDURE — 999N001017 HC STATISTIC GLUCOSE BY METER IP

## 2021-05-22 RX ORDER — OXYCODONE HYDROCHLORIDE 5 MG/1
5 TABLET ORAL EVERY 6 HOURS PRN
Qty: 12 TABLET | Refills: 0 | Status: ON HOLD | OUTPATIENT
Start: 2021-05-22 | End: 2021-06-03

## 2021-05-22 RX ORDER — AMLODIPINE BESYLATE 5 MG/1
5 TABLET ORAL DAILY
Qty: 30 TABLET | Refills: 0 | Status: SHIPPED | OUTPATIENT
Start: 2021-05-23 | End: 2023-03-27

## 2021-05-22 RX ORDER — GLIPIZIDE 2.5 MG/1
2.5 TABLET, EXTENDED RELEASE ORAL
Status: DISCONTINUED | OUTPATIENT
Start: 2021-05-23 | End: 2021-05-22 | Stop reason: HOSPADM

## 2021-05-22 RX ORDER — GLIPIZIDE 2.5 MG/1
2.5 TABLET, EXTENDED RELEASE ORAL
Qty: 30 TABLET | Refills: 0 | Status: SHIPPED | OUTPATIENT
Start: 2021-05-23 | End: 2021-10-25

## 2021-05-22 RX ORDER — LISINOPRIL AND HYDROCHLOROTHIAZIDE 12.5; 2 MG/1; MG/1
1 TABLET ORAL DAILY
Start: 2021-05-22 | End: 2023-03-27

## 2021-05-22 RX ORDER — METFORMIN HCL 500 MG
500 TABLET, EXTENDED RELEASE 24 HR ORAL
Start: 2021-05-22 | End: 2023-03-27

## 2021-05-22 RX ORDER — OXYBUTYNIN CHLORIDE 5 MG/1
5 TABLET ORAL 3 TIMES DAILY
Qty: 90 TABLET | Refills: 0 | Status: SHIPPED | OUTPATIENT
Start: 2021-05-22 | End: 2021-10-25

## 2021-05-22 RX ADMIN — AMLODIPINE BESYLATE 5 MG: 5 TABLET ORAL at 07:31

## 2021-05-22 RX ADMIN — SODIUM CHLORIDE: 9 INJECTION, SOLUTION INTRAVENOUS at 00:30

## 2021-05-22 RX ADMIN — ASPIRIN 81 MG: 81 TABLET ORAL at 07:31

## 2021-05-22 RX ADMIN — OXYCODONE HYDROCHLORIDE 5 MG: 5 TABLET ORAL at 07:31

## 2021-05-22 RX ADMIN — INSULIN ASPART 2 UNITS: 100 INJECTION, SOLUTION INTRAVENOUS; SUBCUTANEOUS at 07:34

## 2021-05-22 RX ADMIN — OXYCODONE HYDROCHLORIDE 5 MG: 5 TABLET ORAL at 00:24

## 2021-05-22 RX ADMIN — OXYBUTYNIN CHLORIDE 5 MG: 5 TABLET ORAL at 07:31

## 2021-05-22 ASSESSMENT — ACTIVITIES OF DAILY LIVING (ADL)
ADLS_ACUITY_SCORE: 10

## 2021-05-22 NOTE — PROGRESS NOTES
VSS, pt requiring 2L oxygen NC to stay >90%. IS given and encouraged. Alert and oriented x 4. Fortune patent. Pt very uncomforatable w/ fortune at beginning of shift, c/o feeling of bladder fulness, need to urinate. PVR was 19ml. Urology called, PRN lidocaine topical and b and o supp ordered. Pt had relief w/ use of lidocaine and PRN oxycodone. Up in room SBA, was up in chair for some time. PIV running NS at 150 ml/hr. Fortune urine watermelon color w/ scant clots. Tolerating diet, good appetite. Dinner  and . Discharge pending.

## 2021-05-22 NOTE — H&P (VIEW-ONLY)
[facility] Urology Consult Progress Note          Assessment and Plan:     Active Problems:    Kidney stone    Assessment: POD #1 s/p attempted Ureteroscopy with ureteral stent placement due to finding of tight distal and mid ureter    Plan:   - discontinue fortune catheter this am  - OK for discharge from Urology standpoint  - Urology will arrange follow up for Ureteroscopy in the near future    Nghia Escalante MD   [facility]  Office: [phone]               Interval History:     doing well; no cp, sob, n/v/d, or abd pain. Fortune removed this AM, voided              Review of Systems:     The 5 point Review of Systems is negative other than noted in the HPI             Medications:     Current Facility-Administered Medications Ordered in Epic   Medication Dose Route Frequency Last Rate Last Admin     acetaminophen (TYLENOL) tablet 650 mg  650 mg Oral Q4H PRN         amLODIPine (NORVASC) tablet 5 mg  5 mg Oral Daily   5 mg at 05/22/21 0731     aspirin EC tablet 81 mg  81 mg Oral Daily   81 mg at 05/22/21 0731     glucose gel 15-30 g  15-30 g Oral Q15 Min PRN        Or     dextrose 50 % injection 25-50 mL  25-50 mL Intravenous Q15 Min PRN        Or     glucagon injection 1 mg  1 mg Subcutaneous Q15 Min PRN         [START ON 5/23/2021] glipiZIDE (GLUCOTROL XL) 24 hr tablet 2.5 mg  2.5 mg Oral Daily with breakfast         HYDROmorphone (PF) (DILAUDID) injection 0.3-0.5 mg  0.3-0.5 mg Intravenous Q2H PRN   0.5 mg at 05/21/21 0749     hydrOXYzine (ATARAX) tablet 25 mg  25 mg Oral Q6H PRN   25 mg at 05/21/21 2126     insulin aspart (NovoLOG) injection (RAPID ACTING)  1-7 Units Subcutaneous TID AC   2 Units at 05/22/21 0734     insulin aspart (NovoLOG) injection (RAPID ACTING)  1-5 Units Subcutaneous At Bedtime         lidocaine (LMX4) cream   Topical Q1H PRN         lidocaine 1 % 0.1-1 mL  0.1-1 mL Other Q1H PRN         melatonin tablet 1 mg  1 mg Oral At Bedtime PRN         naloxone (NARCAN)  injection 0.2 mg  0.2 mg Intravenous Q2 Min PRN        Or     naloxone (NARCAN) injection 0.4 mg  0.4 mg Intravenous Q2 Min PRN        Or     naloxone (NARCAN) injection 0.2 mg  0.2 mg Intramuscular Q2 Min PRN        Or     naloxone (NARCAN) injection 0.4 mg  0.4 mg Intramuscular Q2 Min PRN         omeprazole (priLOSEC) CR capsule 20 mg  20 mg Oral Daily   20 mg at 05/21/21 2126     ondansetron (ZOFRAN-ODT) ODT tab 4 mg  4 mg Oral Q6H PRN        Or     ondansetron (ZOFRAN) injection 4 mg  4 mg Intravenous Q6H PRN   4 mg at 05/21/21 0318     opium-belladonna (B&O SUPPRETTES) 30-16.2 MG per suppository 1 suppository  30 mg Rectal Once PRN         ORAL Pain Medications - may administer as ordered by surgeon for take home use   Does not apply Continuous PRN         oxybutynin (DITROPAN) tablet 5 mg  5 mg Oral TID   5 mg at 05/22/21 0731     oxyCODONE (ROXICODONE) tablet 5 mg  5 mg Oral Q6H PRN   5 mg at 05/22/21 0731     prochlorperazine (COMPAZINE) injection 10 mg  10 mg Intravenous Q6H PRN         rosuvastatin (CRESTOR) tablet 40 mg  40 mg Oral Daily   40 mg at 05/21/21 1901     senna-docusate (SENOKOT-S/PERICOLACE) 8.6-50 MG per tablet 1-2 tablet  1-2 tablet Oral BID PRN         sodium chloride (PF) 0.9% PF flush 3 mL  3 mL Intracatheter Q8H   3 mL at 05/20/21 2238     sodium chloride (PF) 0.9% PF flush 3 mL  3 mL Intracatheter q1 min prn         sodium chloride 0.9% infusion   Intravenous Continuous 150 mL/hr at 05/22/21 0030 New Bag at 05/22/21 0030     tamsulosin (FLOMAX) capsule 0.4 mg  0.4 mg Oral Daily   0.4 mg at 05/21/21 1901     Current Outpatient Medications Ordered in Epic   Medication     [START ON 5/23/2021] amLODIPine (NORVASC) 5 MG tablet     [START ON 5/23/2021] glipiZIDE (GLUCOTROL XL) 2.5 MG 24 hr tablet     lisinopril-hydrochlorothiazide (ZESTORETIC) 20-12.5 MG tablet     metFORMIN (GLUCOPHAGE-XR) 500 MG 24 hr tablet     oxybutynin (DITROPAN) 5 MG tablet     oxyCODONE (ROXICODONE) 5 MG tablet                   Physical Exam:   Vitals were reviewed  Patient Vitals for the past 8 hrs:   BP Temp Temp src Pulse Resp SpO2   05/22/21 0858 (!) 143/78 97.2  F (36.2  C) Temporal 75 18 96 %   05/22/21 0730 (!) 146/92 -- -- 75 18 --     GEN: NAD, lying in bed  HEENT: EOMI  NECK: Supple  ABD: Obese, soft  EXT: No LE edema  : Trent removed           Data:     Lab Results   Component Value Date    CR 3.56 (H) 05/22/2021    CR 4.98 (H) 05/21/2021    CR 5.39 (H) 05/20/2021    CR 2.05 (H) 05/16/2021    CR 2.22 (H) 01/21/2021     Lab Results   Component Value Date    WBC 6.5 05/22/2021    WBC 11.3 (H) 05/21/2021    WBC 11.5 (H) 05/20/2021    HGB 10.9 (L) 05/22/2021    HGB 11.3 (L) 05/21/2021    HGB 12.3 (L) 05/20/2021    HCT 33.2 (L) 05/22/2021    HCT 33.6 (L) 05/21/2021    HCT 35.6 (L) 05/20/2021    MCV 87 05/22/2021    MCV 87 05/21/2021    MCV 85 05/20/2021     (L) 05/22/2021     (L) 05/21/2021     (L) 05/20/2021     Lab Results   Component Value Date    INR 1.01 07/07/2009

## 2021-05-22 NOTE — DISCHARGE SUMMARY
[facility]  Discharge Summary  Name: Ashutosh Moraes    MRN: [id number]  YOB: 1965    Age: 55 year old  Date of Discharge:  5/22/2021  Date of Admission: 5/20/2021  Primary Care Provider: Kathy Chandra  Discharge Physician:  Mouna Gasca MD  Discharging Service:  Hospitalist      Discharge Diagnoses:  1.  Obstructing left ureteral stone with left hydronephrosis  2.  Acute on chronic kidney injury  3.  Stable coronary artery disease  4.  Type 2 diabetes with hyperglycemia  5.  Hyperlipidemia  6.  GERD  7.  Hypertension     Follow-ups Needed After Discharge   1. Repeat BMP on Monday through primary care  2. Follow up with Urology in clinic for stent/stone removal    Unresulted Labs Ordered in the Past 30 Days of this Admission     No orders found from 10/16/2018 to 12/16/2018.        Hospital Course:  Ashutosh Moraes is a 55 year old male with past medical history of coronary artery disease, type 2 diabetes, hypertension, nephrolithiasis, GONZALO who was seen in the ER on 5/16 for left flank pain and found to have an obstructing proximal left ureter stone with mild hydronephrosis who was discharged home but had progressive left flank pain and was subsequently admitted on 5/20/2021 with persistent left ureteral stone, hydronephrosis and acute on chronic kidney disease.    Patient underwent stent placement by urology.  Renal function is improving but still not back to baseline.      1.  Obstructing left ureteral stone with left hydronephrosis status post stent placement: He does have prior history of kidney stones requiring right ureteral stent placement in 1/2021.  Currently complains of left flank pain but denies fever, dysuria, urgency.  CT of the abdomen/pelvis done showed 5 x 3 mm proximal left ureteral stone with mild hydronephrosis.  Has SHELLI, see below for further discussion.  UA with only 5 white cells.  No indication for antibiotics at this time.  Patient underwent stent placement with  urology on 5/21.  He will need to follow-up with urology in clinic for stent and stone removal.  He will continue Flomax and was also started on Ditropan.  Tylenol and oxycodone as needed for pain.     2.  Acute on chronic kidney injury: Baseline creatinine around 1.6-2.2. Creatinine 5.39 on admission, has improved to 3.56 on day of discharge and he is making adequate urine.  He had been taking ibuprofen 800 mg 3 times daily for approximately 48 hours prior to this presentation due to pain from his stone.  I think that his acute kidney injury is due to obstructive uropathy, potentially ATN as well as dehydration.    He will discharge home but needs repeat BMP on Monday through his primary care clinic which she is agreeable to.  I instructed him that he should never take NSAIDs as this can lead to worsening renal function particularly in light of his chronic kidney disease.  He will continue increased fluid intake.  He will also hold Metformin and lisinopril/hydrochlorothiazide for now.     3.  Stable CAD status post stent to OM 2009: Currently has no chest pain.  He was continued on his aspirin and  rosuvastatin.       4.  Diabetes mellitus type 2: Prior to admission on metformin.  Hemoglobin A1c was 7.3 in 6/2020.  HgbA1c this admission elevated at 8.8.  His Metformin is currently on hold due to elevated renal function.  He will discharge on glipizide 2.5 mg daily.  As renal function improves his Metformin could be resumed based on his creatinine.  He does need better control of his diabetes, follow-up in the outpatient setting.     5.  Hyperlipidemia: We will resume rosuvastatin     6.  GERD: We will resume omeprazole     7.  Hypertension: PTA on lisinopril/hydrochlorothiazide which will be held for now given acute kidney injury.    He was started on amlodipine for the time being given his acute kidney injury.  He will hold his lisinopril/hydrochlorothiazide upon discharge.  As above he needs repeat BMP on Monday  "through his primary care clinic.        Discharge Disposition:  Discharged to home     Allergies:  Allergies   Allergen Reactions     Atorvastatin Rash        Condition on Discharge:  Discharge condition: Stable   Discharge vitals: Blood pressure (!) 143/78, pulse 75, temperature 97.2  F (36.2  C), temperature source Temporal, resp. rate 18, height 1.727 m (5' 8\"), weight 115.6 kg (254 lb 12.8 oz), SpO2 96 %.   Code status on discharge: Full Code     History of Illness:  See detailed admission note for full details.    Physical Exam:  Blood pressure (!) 143/78, pulse 75, temperature 97.2  F (36.2  C), temperature source Temporal, resp. rate 18, height 1.727 m (5' 8\"), weight 115.6 kg (254 lb 12.8 oz), SpO2 96 %.  Wt Readings from Last 1 Encounters:   05/20/21 115.6 kg (254 lb 12.8 oz)     General: Alert, awake, no acute distress.  HEENT: Normocephalic, atraumatic, eyes anicteric and without scleral injection, EOMI, MMM.  Cardiac: RRR, normal S1, S2.  No m/g/r. No LE edema.  Pulmonary: Normal chest rise, normal work of breathing.  Lungs CTAB  Abdomen: soft, non-tender, non-distended.  Normoactive BS.  No guarding or rebound tenderness.  Extremities: no deformities.  Warm, well perfused.  Skin: no rashes or lesions noted.  Warm and Dry.  Neuro: No focal deficits noted.  Speech clear.  Coordination and strength grossly normal.  Psych: Appropriate affect. Alert and oriented x3    Procedures other than Imaging:  Left Ureteral Stent Placement/Cystoscopy     Imaging:  Results for orders placed or performed during the hospital encounter of 05/20/21   CT Abdomen Pelvis w/o Contrast    Narrative    EXAM: CT ABDOMEN PELVIS W/O CONTRAST  LOCATION: [facility]  DATE/TIME: 5/20/2021 5:51 PM    INDICATION: Flank pain, kidney stone suspected  Bowel obstruction suspected  COMPARISON: 5/16/2021  TECHNIQUE: CT scan of the abdomen and pelvis was performed without IV contrast. Multiplanar reformats were obtained. Dose " reduction techniques were used.  CONTRAST: None.    FINDINGS:   LOWER CHEST: Linear discoid atelectasis or scar. Small hiatal hernia.    HEPATOBILIARY: Normal.    PANCREAS: Normal.    SPLEEN: Normal.    ADRENAL GLANDS: Normal.    KIDNEYS/BLADDER: Previously identified 5 x 3 mm proximal left ureteral stone has moved distally very slightly, approximately 1-2 cm. There is new mild soft tissue stranding along the length of the ureter. There is mild left hydronephrosis. 4 additional   left-sided calculi are present within left kidney.    Right kidney unchanged with a dominant 8 mm lower pole stone but no right hydronephrosis    Bladder remains negative..    BOWEL: No obstruction or inflammatory change.    LYMPH NODES: Normal.    VASCULATURE: Unremarkable.    PELVIC ORGANS: Normal.    MUSCULOSKELETAL: Normal.      Impression    IMPRESSION:   1.  Minimal distal migration of previously identified 5 x 3 mm proximal left ureteral stone. Mild left hydronephrosis persists with some increase in soft tissue stranding surrounding left ureter.  2.  Bilateral kidney stones remain present.     XR Surgery ARNULFO L/T 5 Min Fluoro w Stills    Narrative    This exam was marked as non-reportable because it will not be read by a   radiologist or a [place] non-radiologist provider.                Consultations:  Consultations This Hospital Stay   UROLOGY IP CONSULT     Recent Lab Results:  Recent Labs   Lab 05/22/21  0706 05/21/21  0650 05/20/21  1656   WBC 6.5 11.3* 11.5*   HGB 10.9* 11.3* 12.3*   HCT 33.2* 33.6* 35.6*   MCV 87 87 85   * 128* 148*     Recent Labs   Lab 05/22/21  0706 05/21/21  0650 05/20/21  1656    132* 131*   POTASSIUM 4.6 4.5 3.6   CHLORIDE 106 102 97   CO2 22 21 24   ANIONGAP 7 9 10   * 149* 144*   BUN 50* 49* 53*   CR 3.56* 4.98* 5.39*   GFRESTIMATED 18* 12* 11*   GFRESTBLACK 21* 14* 13*   KATIANA 7.8* 7.7* 8.2*          Pending Results:    Unresulted Labs Ordered in the Past 30 Days of this Admission      No orders found from 4/20/2021 to 5/21/2021.           Discharge Instructions and Follow-Up:   Discharge Orders      Reason for your hospital stay    You were hospitalized for pain due to a kidney stone and also had significantly elevated kidney function.  You underwent stent placement with urology.  Your kidney function is improving but still remains elevated above your baseline.  Your creatinine had been approximately 5 and has improved to 3.5.  Your baseline creatinine is approximately 2.2.  Your kidney function was elevated due to the kidney stone which was blocking drainage from your kidney in addition to taking NSAIDs (ibuprofen).  It is very important to not take any NSAIDs in the future as this can worsen your kidney function.  You will need to have your kidney function rechecked on Monday through your primary care doctor to ensure your kidney function continues to improve.  You will also need to follow-up with urology for stent and stone removal in clinic.     Follow-up and recommended labs and tests     1.  Please have your kidney function (BMP) checked on Monday at your primary care clinic.  2.  Follow-up with urology in clinic for stent and stone removal.     Activity    Your activity upon discharge: activity as tolerated     Discharge Instructions    1.  Because your kidney function remains elevated you cannot take your Metformin or your lisinopril/hydrochlorothiazide as these can worsen kidney function.  Your primary care doctor will direct you when these could be resumed based on your kidney function.  2.  For now instead of taking Metformin you will take glipizide which is safe for your current kidney function to treat your diabetes.  3.  For now you will take amlodipine for your blood pressure instead of lisinopril/hydrochlorothiazide to treat your high blood pressure.     Full Code     Diet    Follow this diet upon discharge: Orders Placed This Encounter      Advance Diet as Tolerated: Regular  Diet Adult     Discharge Medications   Current Discharge Medication List      START taking these medications    Details   amLODIPine (NORVASC) 5 MG tablet Take 1 tablet (5 mg) by mouth daily  Qty: 30 tablet, Refills: 0    Associated Diagnoses: Essential hypertension      glipiZIDE (GLUCOTROL XL) 2.5 MG 24 hr tablet Take 1 tablet (2.5 mg) by mouth daily (with breakfast)  Qty: 30 tablet, Refills: 0    Associated Diagnoses: Type 2 diabetes mellitus with hyperglycemia, without long-term current use of insulin (H)      oxybutynin (DITROPAN) 5 MG tablet Take 1 tablet (5 mg) by mouth 3 times daily  Qty: 90 tablet, Refills: 0    Associated Diagnoses: Kidney stone         CONTINUE these medications which have CHANGED    Details   lisinopril-hydrochlorothiazide (ZESTORETIC) 20-12.5 MG tablet Take 1 tablet by mouth daily Do not resume this until instructed to do so by your primary care doctor when you kidney function has improved to your baseline.  Qty:      Associated Diagnoses: Essential hypertension      metFORMIN (GLUCOPHAGE-XR) 500 MG 24 hr tablet Take 1 tablet (500 mg) by mouth daily (with dinner) Do not resume this until your kidney function has improved back to your baseline.  Qty:      Associated Diagnoses: Type 2 diabetes mellitus with hyperglycemia, without long-term current use of insulin (H)      oxyCODONE (ROXICODONE) 5 MG tablet Take 1 tablet (5 mg) by mouth every 6 hours as needed for pain  Qty: 12 tablet, Refills: 0    Associated Diagnoses: Kidney stone         CONTINUE these medications which have NOT CHANGED    Details   acetaminophen (TYLENOL) 500 MG tablet Take 2 tablets (1,000 mg) by mouth every 6 hours as needed for mild pain  Qty: 100 tablet, Refills: 0    Associated Diagnoses: Right ureteral stone      aspirin 81 MG EC tablet Take 81 mg by mouth daily       omeprazole (PRILOSEC) 20 MG DR capsule Take 20 mg by mouth daily      ondansetron (ZOFRAN ODT) 4 MG ODT tab Take 1 tablet (4 mg) by mouth every 6  hours as needed for nausea or vomiting  Qty: 20 tablet, Refills: 0      rosuvastatin (CRESTOR) 40 MG tablet Take 40 mg by mouth daily      SENNA-docusate sodium (SENNA S) 8.6-50 MG tablet Take 1-2 tablets by mouth 2 times daily as needed (if taking oxycodone)  Qty: 30 tablet, Refills: 0      tamsulosin (FLOMAX) 0.4 MG capsule Take 1 capsule (0.4 mg) by mouth daily for 10 doses  Qty: 10 capsule, Refills: 0         STOP taking these medications       ibuprofen (ADVIL/MOTRIN) 800 MG tablet Comments:   Reason for Stopping:               Time Spent on this Encounter   I, Mouna Gasca MD, personally saw the patient today and spent greater than 30 minutes discharging this patient.    Mouna Gasca MD

## 2021-05-22 NOTE — PROGRESS NOTES
[facility] Urology Consult Progress Note          Assessment and Plan:     Active Problems:    Kidney stone    Assessment: POD #1 s/p attempted Ureteroscopy with ureteral stent placement due to finding of tight distal and mid ureter    Plan:   - discontinue fortune catheter this am  - OK for discharge from Urology standpoint  - Urology will arrange follow up for Ureteroscopy in the near future    Nghia Escalante MD   [facility]  Office: [phone]               Interval History:     doing well; no cp, sob, n/v/d, or abd pain. Fortune removed this AM, voided              Review of Systems:     The 5 point Review of Systems is negative other than noted in the HPI             Medications:     Current Facility-Administered Medications Ordered in Epic   Medication Dose Route Frequency Last Rate Last Admin     acetaminophen (TYLENOL) tablet 650 mg  650 mg Oral Q4H PRN         amLODIPine (NORVASC) tablet 5 mg  5 mg Oral Daily   5 mg at 05/22/21 0731     aspirin EC tablet 81 mg  81 mg Oral Daily   81 mg at 05/22/21 0731     glucose gel 15-30 g  15-30 g Oral Q15 Min PRN        Or     dextrose 50 % injection 25-50 mL  25-50 mL Intravenous Q15 Min PRN        Or     glucagon injection 1 mg  1 mg Subcutaneous Q15 Min PRN         [START ON 5/23/2021] glipiZIDE (GLUCOTROL XL) 24 hr tablet 2.5 mg  2.5 mg Oral Daily with breakfast         HYDROmorphone (PF) (DILAUDID) injection 0.3-0.5 mg  0.3-0.5 mg Intravenous Q2H PRN   0.5 mg at 05/21/21 0749     hydrOXYzine (ATARAX) tablet 25 mg  25 mg Oral Q6H PRN   25 mg at 05/21/21 2126     insulin aspart (NovoLOG) injection (RAPID ACTING)  1-7 Units Subcutaneous TID AC   2 Units at 05/22/21 0734     insulin aspart (NovoLOG) injection (RAPID ACTING)  1-5 Units Subcutaneous At Bedtime         lidocaine (LMX4) cream   Topical Q1H PRN         lidocaine 1 % 0.1-1 mL  0.1-1 mL Other Q1H PRN         melatonin tablet 1 mg  1 mg Oral At Bedtime PRN         naloxone (NARCAN)  injection 0.2 mg  0.2 mg Intravenous Q2 Min PRN        Or     naloxone (NARCAN) injection 0.4 mg  0.4 mg Intravenous Q2 Min PRN        Or     naloxone (NARCAN) injection 0.2 mg  0.2 mg Intramuscular Q2 Min PRN        Or     naloxone (NARCAN) injection 0.4 mg  0.4 mg Intramuscular Q2 Min PRN         omeprazole (priLOSEC) CR capsule 20 mg  20 mg Oral Daily   20 mg at 05/21/21 2126     ondansetron (ZOFRAN-ODT) ODT tab 4 mg  4 mg Oral Q6H PRN        Or     ondansetron (ZOFRAN) injection 4 mg  4 mg Intravenous Q6H PRN   4 mg at 05/21/21 0318     opium-belladonna (B&O SUPPRETTES) 30-16.2 MG per suppository 1 suppository  30 mg Rectal Once PRN         ORAL Pain Medications - may administer as ordered by surgeon for take home use   Does not apply Continuous PRN         oxybutynin (DITROPAN) tablet 5 mg  5 mg Oral TID   5 mg at 05/22/21 0731     oxyCODONE (ROXICODONE) tablet 5 mg  5 mg Oral Q6H PRN   5 mg at 05/22/21 0731     prochlorperazine (COMPAZINE) injection 10 mg  10 mg Intravenous Q6H PRN         rosuvastatin (CRESTOR) tablet 40 mg  40 mg Oral Daily   40 mg at 05/21/21 1901     senna-docusate (SENOKOT-S/PERICOLACE) 8.6-50 MG per tablet 1-2 tablet  1-2 tablet Oral BID PRN         sodium chloride (PF) 0.9% PF flush 3 mL  3 mL Intracatheter Q8H   3 mL at 05/20/21 2238     sodium chloride (PF) 0.9% PF flush 3 mL  3 mL Intracatheter q1 min prn         sodium chloride 0.9% infusion   Intravenous Continuous 150 mL/hr at 05/22/21 0030 New Bag at 05/22/21 0030     tamsulosin (FLOMAX) capsule 0.4 mg  0.4 mg Oral Daily   0.4 mg at 05/21/21 1901     Current Outpatient Medications Ordered in Epic   Medication     [START ON 5/23/2021] amLODIPine (NORVASC) 5 MG tablet     [START ON 5/23/2021] glipiZIDE (GLUCOTROL XL) 2.5 MG 24 hr tablet     lisinopril-hydrochlorothiazide (ZESTORETIC) 20-12.5 MG tablet     metFORMIN (GLUCOPHAGE-XR) 500 MG 24 hr tablet     oxybutynin (DITROPAN) 5 MG tablet     oxyCODONE (ROXICODONE) 5 MG tablet                   Physical Exam:   Vitals were reviewed  Patient Vitals for the past 8 hrs:   BP Temp Temp src Pulse Resp SpO2   05/22/21 0858 (!) 143/78 97.2  F (36.2  C) Temporal 75 18 96 %   05/22/21 0730 (!) 146/92 -- -- 75 18 --     GEN: NAD, lying in bed  HEENT: EOMI  NECK: Supple  ABD: Obese, soft  EXT: No LE edema  : Trent removed           Data:     Lab Results   Component Value Date    CR 3.56 (H) 05/22/2021    CR 4.98 (H) 05/21/2021    CR 5.39 (H) 05/20/2021    CR 2.05 (H) 05/16/2021    CR 2.22 (H) 01/21/2021     Lab Results   Component Value Date    WBC 6.5 05/22/2021    WBC 11.3 (H) 05/21/2021    WBC 11.5 (H) 05/20/2021    HGB 10.9 (L) 05/22/2021    HGB 11.3 (L) 05/21/2021    HGB 12.3 (L) 05/20/2021    HCT 33.2 (L) 05/22/2021    HCT 33.6 (L) 05/21/2021    HCT 35.6 (L) 05/20/2021    MCV 87 05/22/2021    MCV 87 05/21/2021    MCV 85 05/20/2021     (L) 05/22/2021     (L) 05/21/2021     (L) 05/20/2021     Lab Results   Component Value Date    INR 1.01 07/07/2009

## 2021-05-22 NOTE — PLAN OF CARE
Pt is alert and orient x3. V/S WDL. BG at 0246 213. Oxy given and urojet applied at catheter site due to discomfort and pain. Pt is emptying bladder okay. IV infusing at 150ml/hr. Trent still in place. Pt ambulated during evening. Up with 1 assist with walker and gait. Urology will see pt in AM. BMP in AM

## 2021-05-22 NOTE — PROGRESS NOTES
Reviewed discharge instructions and meds  With pt and spouse, no further questions.  Pt discharged to home.

## 2021-05-24 DIAGNOSIS — Z11.59 ENCOUNTER FOR SCREENING FOR OTHER VIRAL DISEASES: ICD-10-CM

## 2021-05-24 PROBLEM — N20.1 LEFT URETERAL STONE: Status: ACTIVE | Noted: 2021-05-24

## 2021-05-24 LAB — INTERPRETATION ECG - MUSE: NORMAL

## 2021-05-30 DIAGNOSIS — Z11.59 ENCOUNTER FOR SCREENING FOR OTHER VIRAL DISEASES: ICD-10-CM

## 2021-05-30 LAB
SARS-COV-2 RNA RESP QL NAA+PROBE: NORMAL
SPECIMEN SOURCE: NORMAL

## 2021-05-30 PROCEDURE — U0005 INFEC AGEN DETEC AMPLI PROBE: HCPCS | Performed by: STUDENT IN AN ORGANIZED HEALTH CARE EDUCATION/TRAINING PROGRAM

## 2021-05-30 PROCEDURE — U0003 INFECTIOUS AGENT DETECTION BY NUCLEIC ACID (DNA OR RNA); SEVERE ACUTE RESPIRATORY SYNDROME CORONAVIRUS 2 (SARS-COV-2) (CORONAVIRUS DISEASE [COVID-19]), AMPLIFIED PROBE TECHNIQUE, MAKING USE OF HIGH THROUGHPUT TECHNOLOGIES AS DESCRIBED BY CMS-2020-01-R: HCPCS | Performed by: STUDENT IN AN ORGANIZED HEALTH CARE EDUCATION/TRAINING PROGRAM

## 2021-05-31 LAB
LABORATORY COMMENT REPORT: NORMAL
SARS-COV-2 RNA RESP QL NAA+PROBE: NEGATIVE
SPECIMEN SOURCE: NORMAL

## 2021-06-03 ENCOUNTER — ANESTHESIA (OUTPATIENT)
Dept: SURGERY | Facility: CLINIC | Age: 56
End: 2021-06-03
Payer: COMMERCIAL

## 2021-06-03 ENCOUNTER — HOSPITAL ENCOUNTER (OUTPATIENT)
Facility: CLINIC | Age: 56
Discharge: HOME | End: 2021-06-03
Attending: STUDENT IN AN ORGANIZED HEALTH CARE EDUCATION/TRAINING PROGRAM | Admitting: STUDENT IN AN ORGANIZED HEALTH CARE EDUCATION/TRAINING PROGRAM
Payer: COMMERCIAL

## 2021-06-03 ENCOUNTER — ANESTHESIA EVENT (OUTPATIENT)
Dept: SURGERY | Facility: CLINIC | Age: 56
End: 2021-06-03
Payer: COMMERCIAL

## 2021-06-03 ENCOUNTER — APPOINTMENT (OUTPATIENT)
Dept: GENERAL RADIOLOGY | Facility: CLINIC | Age: 56
End: 2021-06-03
Attending: STUDENT IN AN ORGANIZED HEALTH CARE EDUCATION/TRAINING PROGRAM
Payer: COMMERCIAL

## 2021-06-03 VITALS
OXYGEN SATURATION: 96 % | BODY MASS INDEX: 36.53 KG/M2 | HEART RATE: 88 BPM | HEIGHT: 68 IN | TEMPERATURE: 97.6 F | DIASTOLIC BLOOD PRESSURE: 98 MMHG | SYSTOLIC BLOOD PRESSURE: 146 MMHG | RESPIRATION RATE: 16 BRPM | WEIGHT: 241 LBS

## 2021-06-03 DIAGNOSIS — N20.1 LEFT URETERAL STONE: ICD-10-CM

## 2021-06-03 LAB
COPATH REPORT: NORMAL
GLUCOSE BLDC GLUCOMTR-MCNC: 160 MG/DL (ref 70–99)
GLUCOSE BLDC GLUCOMTR-MCNC: 178 MG/DL (ref 70–99)

## 2021-06-03 PROCEDURE — 52332 CYSTOSCOPY AND TREATMENT: CPT | Mod: LT | Performed by: STUDENT IN AN ORGANIZED HEALTH CARE EDUCATION/TRAINING PROGRAM

## 2021-06-03 PROCEDURE — 250N000009 HC RX 250: Performed by: NURSE ANESTHETIST, CERTIFIED REGISTERED

## 2021-06-03 PROCEDURE — 999N000179 XR SURGERY CARM FLUORO LESS THAN 5 MIN W STILLS: Mod: TC

## 2021-06-03 PROCEDURE — 250N000009 HC RX 250: Performed by: STUDENT IN AN ORGANIZED HEALTH CARE EDUCATION/TRAINING PROGRAM

## 2021-06-03 PROCEDURE — 999N000141 HC STATISTIC PRE-PROCEDURE NURSING ASSESSMENT: Performed by: STUDENT IN AN ORGANIZED HEALTH CARE EDUCATION/TRAINING PROGRAM

## 2021-06-03 PROCEDURE — 250N000011 HC RX IP 250 OP 636: Performed by: NURSE ANESTHETIST, CERTIFIED REGISTERED

## 2021-06-03 PROCEDURE — 82962 GLUCOSE BLOOD TEST: CPT | Mod: 91

## 2021-06-03 PROCEDURE — 258N000001 HC RX 258: Performed by: STUDENT IN AN ORGANIZED HEALTH CARE EDUCATION/TRAINING PROGRAM

## 2021-06-03 PROCEDURE — 88300 SURGICAL PATH GROSS: CPT | Mod: TC | Performed by: STUDENT IN AN ORGANIZED HEALTH CARE EDUCATION/TRAINING PROGRAM

## 2021-06-03 PROCEDURE — C1769 GUIDE WIRE: HCPCS | Performed by: STUDENT IN AN ORGANIZED HEALTH CARE EDUCATION/TRAINING PROGRAM

## 2021-06-03 PROCEDURE — 370N000017 HC ANESTHESIA TECHNICAL FEE, PER MIN: Performed by: STUDENT IN AN ORGANIZED HEALTH CARE EDUCATION/TRAINING PROGRAM

## 2021-06-03 PROCEDURE — 710N000009 HC RECOVERY PHASE 1, LEVEL 1, PER MIN: Performed by: STUDENT IN AN ORGANIZED HEALTH CARE EDUCATION/TRAINING PROGRAM

## 2021-06-03 PROCEDURE — 82365 CALCULUS SPECTROSCOPY: CPT | Performed by: STUDENT IN AN ORGANIZED HEALTH CARE EDUCATION/TRAINING PROGRAM

## 2021-06-03 PROCEDURE — 360N000084 HC SURGERY LEVEL 4 W/ FLUORO, PER MIN: Performed by: STUDENT IN AN ORGANIZED HEALTH CARE EDUCATION/TRAINING PROGRAM

## 2021-06-03 PROCEDURE — 255N000002 HC RX 255 OP 636: Performed by: STUDENT IN AN ORGANIZED HEALTH CARE EDUCATION/TRAINING PROGRAM

## 2021-06-03 PROCEDURE — 74420 UROGRAPHY RTRGR +-KUB: CPT | Mod: 26 | Performed by: STUDENT IN AN ORGANIZED HEALTH CARE EDUCATION/TRAINING PROGRAM

## 2021-06-03 PROCEDURE — C2617 STENT, NON-COR, TEM W/O DEL: HCPCS | Performed by: STUDENT IN AN ORGANIZED HEALTH CARE EDUCATION/TRAINING PROGRAM

## 2021-06-03 PROCEDURE — 250N000011 HC RX IP 250 OP 636: Performed by: STUDENT IN AN ORGANIZED HEALTH CARE EDUCATION/TRAINING PROGRAM

## 2021-06-03 PROCEDURE — 258N000003 HC RX IP 258 OP 636: Performed by: ANESTHESIOLOGY

## 2021-06-03 PROCEDURE — 272N000002 HC OR SUPPLY OTHER OPNP: Performed by: STUDENT IN AN ORGANIZED HEALTH CARE EDUCATION/TRAINING PROGRAM

## 2021-06-03 PROCEDURE — 52352 CYSTOURETERO W/STONE REMOVE: CPT | Mod: LT | Performed by: STUDENT IN AN ORGANIZED HEALTH CARE EDUCATION/TRAINING PROGRAM

## 2021-06-03 PROCEDURE — 88300 SURGICAL PATH GROSS: CPT | Mod: 26 | Performed by: PATHOLOGY

## 2021-06-03 PROCEDURE — 272N000001 HC OR GENERAL SUPPLY STERILE: Performed by: STUDENT IN AN ORGANIZED HEALTH CARE EDUCATION/TRAINING PROGRAM

## 2021-06-03 PROCEDURE — 710N000012 HC RECOVERY PHASE 2, PER MINUTE: Performed by: STUDENT IN AN ORGANIZED HEALTH CARE EDUCATION/TRAINING PROGRAM

## 2021-06-03 DEVICE — STENT URETERAL POLARIS ULTRA 6FRX26CM M0061921330: Type: IMPLANTABLE DEVICE | Site: ABDOMEN | Status: FUNCTIONAL

## 2021-06-03 RX ORDER — LABETALOL HYDROCHLORIDE 5 MG/ML
INJECTION, SOLUTION INTRAVENOUS PRN
Status: DISCONTINUED | OUTPATIENT
Start: 2021-06-03 | End: 2021-06-03

## 2021-06-03 RX ORDER — CIPROFLOXACIN 500 MG/1
500 TABLET, FILM COATED ORAL ONCE
Qty: 1 TABLET | Refills: 0 | Status: SHIPPED | OUTPATIENT
Start: 2021-06-03 | End: 2021-06-03

## 2021-06-03 RX ORDER — ONDANSETRON 2 MG/ML
INJECTION INTRAMUSCULAR; INTRAVENOUS PRN
Status: DISCONTINUED | OUTPATIENT
Start: 2021-06-03 | End: 2021-06-03

## 2021-06-03 RX ORDER — HYDROMORPHONE HYDROCHLORIDE 1 MG/ML
.3-.5 INJECTION, SOLUTION INTRAMUSCULAR; INTRAVENOUS; SUBCUTANEOUS EVERY 5 MIN PRN
Status: CANCELLED | OUTPATIENT
Start: 2021-06-03

## 2021-06-03 RX ORDER — SODIUM CHLORIDE, SODIUM LACTATE, POTASSIUM CHLORIDE, CALCIUM CHLORIDE 600; 310; 30; 20 MG/100ML; MG/100ML; MG/100ML; MG/100ML
INJECTION, SOLUTION INTRAVENOUS CONTINUOUS
Status: CANCELLED | OUTPATIENT
Start: 2021-06-03

## 2021-06-03 RX ORDER — LABETALOL HYDROCHLORIDE 5 MG/ML
10 INJECTION, SOLUTION INTRAVENOUS
Status: CANCELLED | OUTPATIENT
Start: 2021-06-03

## 2021-06-03 RX ORDER — ACETAMINOPHEN 500 MG
1000 TABLET ORAL EVERY 6 HOURS PRN
Qty: 100 TABLET | Refills: 0 | Status: ON HOLD | OUTPATIENT
Start: 2021-06-03 | End: 2021-10-28

## 2021-06-03 RX ORDER — ONDANSETRON 4 MG/1
4 TABLET, ORALLY DISINTEGRATING ORAL EVERY 30 MIN PRN
Status: CANCELLED | OUTPATIENT
Start: 2021-06-03

## 2021-06-03 RX ORDER — OXYCODONE HYDROCHLORIDE 5 MG/1
5 TABLET ORAL EVERY 6 HOURS PRN
Qty: 8 TABLET | Refills: 0 | Status: SHIPPED | OUTPATIENT
Start: 2021-06-03 | End: 2021-06-06

## 2021-06-03 RX ORDER — CEFAZOLIN SODIUM 2 G/100ML
2 INJECTION, SOLUTION INTRAVENOUS
Status: COMPLETED | OUTPATIENT
Start: 2021-06-03 | End: 2021-06-03

## 2021-06-03 RX ORDER — NALOXONE HYDROCHLORIDE 0.4 MG/ML
0.4 INJECTION, SOLUTION INTRAMUSCULAR; INTRAVENOUS; SUBCUTANEOUS
Status: CANCELLED | OUTPATIENT
Start: 2021-06-03

## 2021-06-03 RX ORDER — NALOXONE HYDROCHLORIDE 0.4 MG/ML
0.2 INJECTION, SOLUTION INTRAMUSCULAR; INTRAVENOUS; SUBCUTANEOUS
Status: CANCELLED | OUTPATIENT
Start: 2021-06-03

## 2021-06-03 RX ORDER — LIDOCAINE 40 MG/G
CREAM TOPICAL
Status: DISCONTINUED | OUTPATIENT
Start: 2021-06-03 | End: 2021-06-03 | Stop reason: HOSPADM

## 2021-06-03 RX ORDER — PROPOFOL 10 MG/ML
INJECTION, EMULSION INTRAVENOUS PRN
Status: DISCONTINUED | OUTPATIENT
Start: 2021-06-03 | End: 2021-06-03

## 2021-06-03 RX ORDER — DOCUSATE SODIUM 100 MG/1
100 CAPSULE, LIQUID FILLED ORAL 2 TIMES DAILY
Qty: 30 CAPSULE | Refills: 0 | Status: ON HOLD | OUTPATIENT
Start: 2021-06-03 | End: 2021-10-28

## 2021-06-03 RX ORDER — FENTANYL CITRATE 50 UG/ML
INJECTION, SOLUTION INTRAMUSCULAR; INTRAVENOUS PRN
Status: DISCONTINUED | OUTPATIENT
Start: 2021-06-03 | End: 2021-06-03

## 2021-06-03 RX ORDER — ONDANSETRON 2 MG/ML
4 INJECTION INTRAMUSCULAR; INTRAVENOUS EVERY 30 MIN PRN
Status: CANCELLED | OUTPATIENT
Start: 2021-06-03

## 2021-06-03 RX ORDER — GLYCOPYRROLATE 0.2 MG/ML
INJECTION, SOLUTION INTRAMUSCULAR; INTRAVENOUS PRN
Status: DISCONTINUED | OUTPATIENT
Start: 2021-06-03 | End: 2021-06-03

## 2021-06-03 RX ORDER — LIDOCAINE HYDROCHLORIDE 10 MG/ML
INJECTION, SOLUTION INFILTRATION; PERINEURAL PRN
Status: DISCONTINUED | OUTPATIENT
Start: 2021-06-03 | End: 2021-06-03

## 2021-06-03 RX ORDER — HYDRALAZINE HYDROCHLORIDE 20 MG/ML
2.5-5 INJECTION INTRAMUSCULAR; INTRAVENOUS EVERY 10 MIN PRN
Status: CANCELLED | OUTPATIENT
Start: 2021-06-03

## 2021-06-03 RX ORDER — DEXAMETHASONE SODIUM PHOSPHATE 4 MG/ML
INJECTION, SOLUTION INTRA-ARTICULAR; INTRALESIONAL; INTRAMUSCULAR; INTRAVENOUS; SOFT TISSUE PRN
Status: DISCONTINUED | OUTPATIENT
Start: 2021-06-03 | End: 2021-06-03

## 2021-06-03 RX ORDER — FENTANYL CITRATE 50 UG/ML
25-50 INJECTION, SOLUTION INTRAMUSCULAR; INTRAVENOUS
Status: CANCELLED | OUTPATIENT
Start: 2021-06-03

## 2021-06-03 RX ORDER — ATROPA BELLADONNA AND OPIUM 16.2; 3 MG/1; MG/1
SUPPOSITORY RECTAL PRN
Status: DISCONTINUED | OUTPATIENT
Start: 2021-06-03 | End: 2021-06-03 | Stop reason: HOSPADM

## 2021-06-03 RX ORDER — SODIUM CHLORIDE, SODIUM LACTATE, POTASSIUM CHLORIDE, CALCIUM CHLORIDE 600; 310; 30; 20 MG/100ML; MG/100ML; MG/100ML; MG/100ML
INJECTION, SOLUTION INTRAVENOUS CONTINUOUS
Status: DISCONTINUED | OUTPATIENT
Start: 2021-06-03 | End: 2021-06-03 | Stop reason: HOSPADM

## 2021-06-03 RX ORDER — CEFAZOLIN SODIUM 2 G/100ML
2 INJECTION, SOLUTION INTRAVENOUS SEE ADMIN INSTRUCTIONS
Status: DISCONTINUED | OUTPATIENT
Start: 2021-06-03 | End: 2021-06-03 | Stop reason: HOSPADM

## 2021-06-03 RX ADMIN — FENTANYL CITRATE 100 MCG: 50 INJECTION, SOLUTION INTRAMUSCULAR; INTRAVENOUS at 14:02

## 2021-06-03 RX ADMIN — MIDAZOLAM 2 MG: 1 INJECTION INTRAMUSCULAR; INTRAVENOUS at 13:55

## 2021-06-03 RX ADMIN — LABETALOL HYDROCHLORIDE 10 MG: 5 INJECTION, SOLUTION INTRAVENOUS at 14:28

## 2021-06-03 RX ADMIN — SODIUM CHLORIDE, POTASSIUM CHLORIDE, SODIUM LACTATE AND CALCIUM CHLORIDE: 600; 310; 30; 20 INJECTION, SOLUTION INTRAVENOUS at 13:57

## 2021-06-03 RX ADMIN — ONDANSETRON HYDROCHLORIDE 4 MG: 2 INJECTION, SOLUTION INTRAVENOUS at 14:02

## 2021-06-03 RX ADMIN — PROPOFOL 200 MG: 10 INJECTION, EMULSION INTRAVENOUS at 14:02

## 2021-06-03 RX ADMIN — DEXAMETHASONE SODIUM PHOSPHATE 4 MG: 4 INJECTION, SOLUTION INTRA-ARTICULAR; INTRALESIONAL; INTRAMUSCULAR; INTRAVENOUS; SOFT TISSUE at 14:02

## 2021-06-03 RX ADMIN — SODIUM CHLORIDE, POTASSIUM CHLORIDE, SODIUM LACTATE AND CALCIUM CHLORIDE: 600; 310; 30; 20 INJECTION, SOLUTION INTRAVENOUS at 12:35

## 2021-06-03 RX ADMIN — CEFAZOLIN SODIUM 2 G: 2 INJECTION, SOLUTION INTRAVENOUS at 14:01

## 2021-06-03 RX ADMIN — LIDOCAINE HYDROCHLORIDE 30 MG: 10 INJECTION, SOLUTION INFILTRATION; PERINEURAL at 14:02

## 2021-06-03 RX ADMIN — FENTANYL CITRATE 50 MCG: 50 INJECTION, SOLUTION INTRAMUSCULAR; INTRAVENOUS at 14:21

## 2021-06-03 RX ADMIN — GLYCOPYRROLATE 0.2 MG: 0.2 INJECTION, SOLUTION INTRAMUSCULAR; INTRAVENOUS at 14:02

## 2021-06-03 ASSESSMENT — MIFFLIN-ST. JEOR: SCORE: 1912.67

## 2021-06-03 NOTE — ANESTHESIA PREPROCEDURE EVALUATION
Anesthesia Pre-Procedure Evaluation    Patient: Ashutosh Moraes   MRN: [id number] : 1965        Preoperative Diagnosis: Left ureteral stone [N20.1]   Procedure : Procedure(s):  cystoscopy, left ureteroscopy with laser lithotripsy and stone basketing, left retrograde pyelogram, left ureteral stent exchange     Past Medical History:   Diagnosis Date     CAD (coronary artery disease)     Cardiac cath 2009: RAMANA to OM     Diabetes (H)     Type 2     Gastro-oesophageal reflux disease      HLD (hyperlipidemia)      Hypertension      Kidney stones      Pancreatic disease      Sleep apnea     Doesn't use a CPAP     Stented coronary artery 2009      Past Surgical History:   Procedure Laterality Date     APPENDECTOMY       COLONOSCOPY       COMBINED CYSTOSCOPY, RETROGRADES, URETEROSCOPY, LASER HOLMIUM LITHOTRIPSY URETER(S), INSERT STENT Right 2021    Procedure: Cystoscopy, right ureteroscopy with laser lithotripsy, right ureteroscopy with stone basketing, right retrograde pyelogram, right ureteral stent placement, fluoroscopic interpretation <1 hour physician time;  Surgeon: Hudson Montana MD;  Location: [facility] OR     COMBINED CYSTOSCOPY, RETROGRADES, URETEROSCOPY, LASER HOLMIUM LITHOTRIPSY URETER(S), INSERT STENT Left 2021    Procedure: Cystoscopy, left retrograde pyelogram, left ureteroscopy and left ureteral stent placement;  Surgeon: Hudson Montana MD;  Location: [facility] OR     HEART CATH LEFT HEART CATH  2009    RAMANA to OM      Allergies   Allergen Reactions     Atorvastatin Rash      Social History     Tobacco Use     Smoking status: Never Smoker     Smokeless tobacco: Never Used   Substance Use Topics     Alcohol use: No     Alcohol/week: 0.0 standard drinks      Wt Readings from Last 1 Encounters:   21 109.3 kg (241 lb)        Anesthesia Evaluation   Pt has had prior anesthetic.         ROS/MED HX  ENT/Pulmonary:     (+) sleep apnea, doesn't use CPAP,     Neurologic:        Cardiovascular:     (+) Dyslipidemia hypertension--CAD --stent-2009.     METS/Exercise Tolerance:     Hematologic:     (+) anemia,     Musculoskeletal:       GI/Hepatic:     (+) GERD,     Renal/Genitourinary:     (+) renal disease, type: ARF and CRI, Nephrolithiasis ,     Endo:     (+) type II DM, Obesity,     Psychiatric/Substance Use:       Infectious Disease:       Malignancy:       Other:            Physical Exam    Airway        Mallampati: II   TM distance: > 3 FB   Neck ROM: full   Mouth opening: > 3 cm    Respiratory Devices and Support         Dental           Cardiovascular             Pulmonary                   OUTSIDE LABS:  CBC:   Lab Results   Component Value Date    WBC 6.5 05/22/2021    WBC 11.3 (H) 05/21/2021    HGB 10.9 (L) 05/22/2021    HGB 11.3 (L) 05/21/2021    HCT 33.2 (L) 05/22/2021    HCT 33.6 (L) 05/21/2021     (L) 05/22/2021     (L) 05/21/2021     BMP:   Lab Results   Component Value Date     05/22/2021     (L) 05/21/2021    POTASSIUM 4.6 05/22/2021    POTASSIUM 4.5 05/21/2021    CHLORIDE 106 05/22/2021    CHLORIDE 102 05/21/2021    CO2 22 05/22/2021    CO2 21 05/21/2021    BUN 50 (H) 05/22/2021    BUN 49 (H) 05/21/2021    CR 3.56 (H) 05/22/2021    CR 4.98 (H) 05/21/2021     (H) 05/22/2021     (H) 05/21/2021     COAGS:   Lab Results   Component Value Date    PTT 42 (H) 07/02/2009    INR 1.01 07/07/2009     POC:   Lab Results   Component Value Date     (H) 06/03/2021     HEPATIC:   Lab Results   Component Value Date    ALBUMIN 4.0 07/08/2009    PROTTOTAL 7.0 07/08/2009    ALT <5 (L) 05/11/2018    AST 68 (H) 07/08/2009    ALKPHOS 76 07/08/2009    BILITOTAL 0.3 07/08/2009     OTHER:   Lab Results   Component Value Date    A1C 8.8 (H) 05/21/2021    KATIANA 7.8 (L) 05/22/2021    CRP 10.1 (H) 03/02/2014    SED 6 03/02/2014       Anesthesia Plan    ASA Status:  3   NPO Status:  NPO Appropriate    Anesthesia Type: General.     - Airway: ETT   Induction:  Intravenous, Propofol.   Maintenance: Balanced.        Consents    Anesthesia Plan(s) and associated risks, benefits, and realistic alternatives discussed. Questions answered and patient/representative(s) expressed understanding.     - Discussed with:  Patient         Postoperative Care    Pain management: IV analgesics.   PONV prophylaxis: Ondansetron (or other 5HT-3), Dexamethasone or Solumedrol     Comments:                Edgar Martin MD

## 2021-06-03 NOTE — ANESTHESIA CARE TRANSFER NOTE
Patient: Ashutosh Moraes    Procedure(s):  cystoscopy, left ureteroscopy with stand by laser lithotripsy and stone basketing, left retrograde pyelogram, left ureteral stent exchange    Diagnosis: Left ureteral stone [N20.1]  Diagnosis Additional Information: No value filed.    Anesthesia Type:   General     Note:    Oropharynx: oropharynx clear of all foreign objects  Level of Consciousness: awake  Oxygen Supplementation: face mask  Level of Supplemental Oxygen (L/min / FiO2): 6 lpm  Independent Airway: airway patency satisfactory and stable  Dentition: dentition unchanged  Vital Signs Stable: post-procedure vital signs reviewed and stable  Report to RN Given: handoff report given  Patient transferred to: PACU  Comments: Patient with spontaneous respirations and adequate tidal volumes. Patient awake and responsive. LMA removed in OR to 6L facemask. To PACU ventilating well. VSS. Report given.    Handoff Report: Identifed the Patient, Identified the Reponsible Provider, Reviewed the pertinent medical history, Discussed the surgical course, Reviewed Intra-OP anesthesia mangement and issues during anesthesia, Set expectations for post-procedure period and Allowed opportunity for questions and acknowledgement of understanding      Vitals: (Last set prior to Anesthesia Care Transfer)  CRNA VITALS  6/3/2021 1415 - 6/3/2021 1451      6/3/2021             NIBP:  (!) 167/111    NIBP Mean:  132        Electronically Signed By: RIN Gutierrez CRNA  Olga 3, 2021  2:51 PM

## 2021-06-03 NOTE — ANESTHESIA PROCEDURE NOTES
Airway       Patient location during procedure: OR       Procedure Start/Stop Times: 6/3/2021 2:04 PM  Staff -        Anesthesiologist:  Sae Hudson MD       CRNA: Jeff Preston APRN CRNA       Performed By: CRNA  Consent for Airway        Urgency: elective  Indications and Patient Condition       Indications for airway management: ivy-procedural       Induction type:intravenous       Mask difficulty assessment: 0 - not attempted    Final Airway Details       Final airway type: supraglottic airway    Supraglottic Airway Details        Type: LMA       Brand: I-Gel       LMA size: 5    Post intubation assessment        Placement verified by: capnometry, equal breath sounds and chest rise        Number of attempts at approach: 1       Number of other approaches attempted: 0       Secured with: plastic tape       Ease of procedure: easy       Dentition: Intact and Unchanged

## 2021-06-03 NOTE — ANESTHESIA POSTPROCEDURE EVALUATION
Patient: Ashutosh Moraes    Procedure(s):  cystoscopy, left ureteroscopy with stand by laser lithotripsy and stone basketing, left retrograde pyelogram, left ureteral stent exchange    Diagnosis:Left ureteral stone [N20.1]  Diagnosis Additional Information: No value filed.    Anesthesia Type:  General    Note:  Disposition: Outpatient   Postop Pain Control: Uneventful            Sign Out: Well controlled pain   PONV: No   Neuro/Psych: Uneventful            Sign Out: Acceptable/Baseline neuro status   Airway/Respiratory: Uneventful            Sign Out: Acceptable/Baseline resp. status   CV/Hemodynamics: Uneventful            Sign Out: Acceptable CV status; No obvious hypovolemia; No obvious fluid overload   Other NRE: NONE   DID A NON-ROUTINE EVENT OCCUR? No           Last vitals:  Vitals:    06/03/21 1515 06/03/21 1545 06/03/21 1600   BP: (!) 145/94 (!) 146/98 (!) 141/100   Pulse: 91 87 88   Resp: 11 15 16   Temp:      SpO2: 96% 90% 95%       Last vitals prior to Anesthesia Care Transfer:  CRNA VITALS  6/3/2021 1415 - 6/3/2021 1515      6/3/2021             NIBP:  (!) 167/111    NIBP Mean:  132          Electronically Signed By: Sae Hudson MD  Olga 3, 2021  4:07 PM

## 2021-06-03 NOTE — DISCHARGE INSTRUCTIONS
POSTOPERATIVE INSTRUCTIONS    Diagnosis-------------------------------   Left ureteral and renal stones    Procedure-------------------------------  Procedure(s) (LRB):  cystoscopy, left ureteroscopy with stone basketing, left retrograde pyelogram, left ureteral stent exchange (Left)      Findings--------------------------------  5 mm proximal left ureteral stone, additional two 3-4 mm stones within the left kidney basketed out. Additional punctate embedded stones/Jarred's plaques visible  Mild left hydronephrosis    Home-going instructions-----------------         Activity Limitation:     - No driving or operating heavy machinery while on narcotic pain medication.     FOLLOW THESE INSTRUCTIONS AS INDICATED BELOW:  - Observe operative area for signs of excessive bleeding.  - You may shower.  - Increase fluid intake to promote clear urine.  - Resume usual diet as tolerated    What to expect while recovering-----------  - You may experience some intermittent bleeding that makes your urine pink or cherry colored. This is normal.  - However, if you are unable to urinate, passing large amount of clots, have ayan blood in your urine, or have a temperature >101 degrees, call the urology nurse on call, or present to your nearest emergency department.  - You are encouraged to walk daily, and have no activity restrictions.   - A URETERAL STENT has been placed that allows urine to flow unobstructed from your kidney into your bladder.  The stent has a curl in the kidney and a curl in the bladder.  The curl in the bladder can cause some urgency and frequency of urination as well as some mild blood in the urine.  The curl in the kidney can cause some mild flank discomfort.  This may be more noticeable when you urinate.  A URETERAL STENT is meant to be left in temporarily.  It must be removed or changed no later than 3 months after its insertion.  If it's not removed it can result in stone overgrowth on the stent that can cause  pain, infection, and can be very difficult to remove.      Discharge Medications/instructions:     - Flomax (tamsulosin) to be taken daily until stent is removed    - Antibiotics (ciprofloxacin) are to be taken with you to your scheduled return visit for stent removal    - Take Tylenol 1000mg every 6 hours for pain    - Take Oxycodone 5mg every 6 hours only for break through pain    - Take Colace while taking Oxycodone to prevent constipation      Questions/concerns------------------------  [facility]: [phone]    Future appointments  Follow up as scheduled 6/11/21 for stent removal in the office      Hudson Montana MD         GENERAL ANESTHESIA OR SEDATION ADULT DISCHARGE INSTRUCTIONS   SPECIAL PRECAUTIONS FOR 24 HOURS AFTER SURGERY    IT IS NOT UNUSUAL TO FEEL LIGHT-HEADED OR FAINT, UP TO 24 HOURS AFTER SURGERY OR WHILE TAKING PAIN MEDICATION.  IF YOU HAVE THESE SYMPTOMS; SIT FOR A FEW MINUTES BEFORE STANDING AND HAVE SOMEONE ASSIST YOU WHEN YOU GET UP TO WALK OR USE THE BATHROOM.    YOU SHOULD REST AND RELAX FOR THE NEXT 24 HOURS AND YOU MUST MAKE ARRANGEMENTS TO HAVE SOMEONE STAY WITH YOU FOR AT LEAST 24 HOURS AFTER YOUR DISCHARGE.  AVOID HAZARDOUS AND STRENUOUS ACTIVITIES.  DO NOT MAKE IMPORTANT DECISIONS FOR 24 HOURS.    DO NOT DRIVE ANY VEHICLE OR OPERATE MECHANICAL EQUIPMENT FOR 24 HOURS FOLLOWING THE END OF YOUR SURGERY.  EVEN THOUGH YOU MAY FEEL NORMAL, YOUR REACTIONS MAY BE AFFECTED BY THE MEDICATION YOU HAVE RECEIVED.    DO NOT DRINK ALCOHOLIC BEVERAGES FOR 24 HOURS FOLLOWING YOUR SURGERY.    DRINK CLEAR LIQUIDS (APPLE JUICE, GINGER ALE, 7-UP, BROTH, ETC.).  PROGRESS TO YOUR REGULAR DIET AS YOU FEEL ABLE.    YOU MAY HAVE A DRY MOUTH, A SORE THROAT, MUSCLES ACHES OR TROUBLE SLEEPING.  THESE SHOULD GO AWAY AFTER 24 HOURS.    CALL YOUR DOCTOR FOR ANY OF THE FOLLOWING:  SIGNS OF INFECTION (FEVER, GROWING TENDERNESS AT THE SURGERY SITE, A LARGE AMOUNT OF DRAINAGE OR  BLEEDING, SEVERE PAIN, FOUL-SMELLING DRAINAGE, REDNESS OR SWELLING.    IT HAS BEEN OVER 8 TO 10 HOURS SINCE SURGERY AND YOU ARE STILL NOT ABLE TO URINATE (PASS WATER).

## 2021-06-03 NOTE — OP NOTE
[facility]  Operative Note    Pre-operative diagnosis: Left ureteral stone [N20.1]   Post-operative diagnosis Left ureteral and renal stones   Procedure: Procedure(s):  cystoscopy, left ureteroscopy with stone basketing, stand by laser lithotripsy, left retrograde pyelogram, left ureteral stent exchange  Fluoroscopic interpretation <1 hour physician time   Surgeon: Hudson Montana MD   Assistants(s): none   Anesthesia: General    Estimated blood loss: Minimal    Total IV fluids: (See anesthesia record)   Blood transfusion: No transfusion was given during surgery   Total urine output: Not measured   Drains: Left ureteral stent   Specimens: ID Type Source Tests Collected by Time Destination   1 : left ureteral  stone Calculus/Stone Ureter, Left STONE ANALYSIS Hudson Montana MD 6/3/2021  2:34 PM         Implants: Implant Name Type Inv. Item Serial No.  Lot No. LRB No. Used Action   STENT URETERAL POLARIS ULTRA [id number] Stent STENT URETERAL POLARIS ULTRA [id number]  [employer] [id number] Left 1 Explanted   STENT URETERAL POLARIS ULTRA [id number] Stent STENT URETERAL POLARIS ULTRA [id number]  [employer] [id number] Right 1 Explanted   STENT URETERAL POLARIS ULTRA [id number] - [id number] Stent STENT URETERAL POLARIS ULTRA [id number]  [employer] [id number] Left 1 Implanted        Findings: 5 mm proximal left ureteral stone, additional two 3-4 mm stones within the left kidney basketed out. Additional punctate embedded stones/Jarred's plaques visible  Mild left hydronephrosis     Complications: None.   Condition: Stable       Description of procedure:  54 yo M with long history of nephrolithiasis (both calcium oxalate and uric acid) and multiple prior stone surgery most recently right ureteroscopy in January 2021, who presented with obstructing left proximal ureteral stone with associated acute kidney  injury on CKD and underwent cystoscopy and stent placement 5/21/21. He returns today for ureteroscopic management. He has contralateral right sided stones which are currently asymptomatic and which we will observe at this point. Risks including ureteral injury, bleeding, infection, stent pain and irritation discussed. Informed consent was obtained.    Patient was brought to the operating room #14.  Weight and renal appropriate dose of Ancef antibiotics were given prior to procedure.  General anesthesia was induced, he was placed in dorsal lithotomy position, prepped and draped in standard sterile fashion.  A timeout was performed.    A 22 [place] Storz cystoscope was assembled and passed through the urethra into the bladder.  The left ureteral stent was noted emanating from the left ureteral orifice without any encrustation.  This was grasped with a cup biopsy forceps and brought out the urethral meatus where it was cannulated with a 0.035 inch Glidewire.  The wire was passed up to the renal pelvis under fluoroscopic guidance and the stent was removed intact and discarded.  The wire was clipped to the drapes as a safety wire.  A long semirigid ureteroscope was assembled and passed through the urethra, into the bladder and up the left ureteral orifice.  The scope was passed all the way up to the proximal ureter where a 5 mm stone was seen.  This was basketed out using a [place] Halo basket.  I then repassed the ureteroscope up the ureter as far as I could proximally and I did not see any other ureteral stones.  Gentle retrograde pyelogram showed mild hydronephrosis.  A sensor wire was passed through the scope up to the renal pelvis under fluoroscopic guidance and the scope was backloaded off of the wire.  Over the sensor wire an Olympus digital flexible ureteroscope was passed up to the renal pelvis and the wire was removed.  There were multiple small stones about 3 to 4 mm free-floating within the collecting  system.  There were additional Jarred's plaques and small punctate embedded stones.  The sensor wire was replaced through the scope up to the renal pelvis and the ureteroscope was removed.  Over the Sensor an 11/13 [place] by 46 cm [employer] [place] [employer] Navigator ureteral access sheath was passed up the proximal ureter and the obturator and wire were removed.  The flexible ureteroscope was then passed up the sheath and the free-floating stones were basketed out.  At the completion of the procedure there were no remaining stones within the renal collecting system aside from the small embedded stones/Jarred's plaques. Pullout ureteroscopy revealed the ureter to be in good condition with no tears or lacerations.  A stent was then placed in the usual fashion under cystoscopic and fluoroscopic guidance with good coils noted proximally and distally.  Bladder was drained and scope was removed.  A belladonna and opium suppository was placed per rectum.  Patient was cleaned up, awoken from anesthesia and brought to PACU in stable condition.  He will return for cystoscopy and stent removal in the office    Hudson Montana MD   [facility]  [phone] clinic phone

## 2021-06-04 LAB — INTERPRETATION ECG - MUSE: NORMAL

## 2021-06-06 LAB
APPEARANCE STONE: NORMAL
COMPN STONE: NORMAL
NUMBER STONE: 3
SIZE STONE: NORMAL MM
WT STONE: 45 MG

## 2021-06-14 ENCOUNTER — OFFICE VISIT (OUTPATIENT)
Dept: UROLOGY | Facility: CLINIC | Age: 56
End: 2021-06-14
Payer: COMMERCIAL

## 2021-06-14 VITALS
BODY MASS INDEX: 36.37 KG/M2 | OXYGEN SATURATION: 97 % | HEIGHT: 68 IN | SYSTOLIC BLOOD PRESSURE: 146 MMHG | DIASTOLIC BLOOD PRESSURE: 80 MMHG | HEART RATE: 96 BPM | WEIGHT: 240 LBS

## 2021-06-14 DIAGNOSIS — N20.0 KIDNEY STONE: Primary | ICD-10-CM

## 2021-06-14 PROCEDURE — 52310 CYSTOSCOPY AND TREATMENT: CPT | Performed by: STUDENT IN AN ORGANIZED HEALTH CARE EDUCATION/TRAINING PROGRAM

## 2021-06-14 RX ADMIN — LIDOCAINE HYDROCHLORIDE 20 ML: 20 JELLY TOPICAL at 16:00

## 2021-06-14 ASSESSMENT — MIFFLIN-ST. JEOR: SCORE: 1908.13

## 2021-06-14 NOTE — LETTER
6/14/2021       RE: Ashutosh Moraes  [address]  [place] [facility] [place] [facility] [address]     Dear Colleague,    Thank you for referring your patient, Ashutosh Moraes, to the [facility] at [facility]. Please see a copy of my visit note below.    PRE-PROCEDURE DIAGNOSIS: left ureteral and kidney stones    POST-PROCEDURE DIAGNOSIS: left ureteral and kidney stones    PROCEDURE: Cystoscopy with left ureteral stent removal    HISTORY: 54 yo M with long history of nephrolithiasis (both calcium oxalate and uric acid) and multiple prior stone surgery most recently right ureteroscopy in January 2021, who presented with obstructing left proximal ureteral stone with associated acute kidney injury on CKD and underwent cystoscopy and stent placement 5/21/21, followed by ureteroscopy 6/3/2021    REVIEW OF OFFICE STUDIES (e.g., uroflow or PVR): Calculi composed primarily of:   20% calcium oxalate monohydrate, and   80% uric acid.     DESCRIPTION OF PROCEDURE: After informed consent was obtained, the patient was brought to the procedure room where he was placed in the supine position with all pressure points well padded.  The penis was prepped and draped in sterile fashion. A flexible cystoscope was introduced through a well-lubricated urethra. The stent was visualized, grasped with a flexible grasper and removed intact and discarded    The flexible cystoscope was removed and the findings were described to the patient.     ASSESSMENT AND PLAN: 54 yo M with long history of nephrolithiasis (both calcium oxalate and uric acid) and multiple prior stone surgery most recently right ureteroscopy in January 2021, who presented with obstructing left proximal ureteral stone with associated acute kidney injury on CKD and underwent cystoscopy and stent placement 5/21/21, followed by ureteroscopy 6/3/2021    Discussed stone diet with reduced animal protein for predominance of uric  aid now    - follow up with litholink and renal ultrasound prior in 6-8 weeks  -referral to nephrology for medical management of stones.      Hudson Montana MD   [facility]  [phone] clinic phone

## 2021-06-14 NOTE — PATIENT INSTRUCTIONS
"AFTER YOUR CYSTOSCOPY STENT REMOVAL  ?  ?  You have just completed a cystoscopy, or \"cysto\", which allowed your physician to learn more about your bladder (or to remove a stent placed after surgery). We suggest that you continue to avoid caffeine, fruit juice, and alcohol for the next 24 hours, however, you are encouraged to return to your normal activities.  ?  ?  A few things that are considered normal after your cystoscopy:  ?  * small amount of bleeding (or spotting) that clears within the next 24 hours  ?  * slight burning sensation with urination  ?  * sensation of needing to void (urinate) more frequently  ?  * the feeling of \"air\" in your urine  ?  * mild discomfort that is relieved with Tylenol    * bladder spasms  ?  ?  ?  Please contact our office promptly if you:  ?  * develop a fever above 101 degrees  ?  * are unable to urinate  ?  * develop bright red blood that does not stop  ?  * experience severe pain or swelling  ?  ?  ?  And of course, please contact our office with any concerns or questions [phone]  ?      Patient Education     Preventing Kidney Stones  If you ve had a kidney stone, you may worry that you ll have another. Removing or passing your stone doesn t prevent future stones. But with your healthcare provider s help, you can reduce your risk of forming new stones. Follow up with your healthcare provider to help find new stones. Depending on your medical condition, you may need follow-up every 3 months to a year for the rest of your life.     Drink lots of water  Staying well-hydrated is the best way to reduce your risk of future stones. Drink 8 12-ounce glasses of water daily. Have 2 with each meal and 2 between meals. Keep track of your intake. Try keeping a pitcher of water nearby during the day and at night.   Take medicines if needed  Medicines, including vitamins and minerals, may be prescribed for certain types of stones. You may want to write your doses and medicine times on a " calendar. Some medicines decrease stone-forming chemicals in your blood. Others help prevent those chemicals from crystallizing in urine. Still others help keep a normal acid balance in your urine.   Follow your prescribed diet  Your healthcare provider will tell you which foods contain the chemicals you should avoid. Your healthcare provider may also suggest talking to a dietitian. He or she can help you plan meals you ll enjoy. These meals won t put you at risk for future stones. Bring your spouse, partner, or close friend with you when you meet with the dietitian so you can have support for your necessary diet changes.   You may be told to limit certain foods, depending on which type of stones you ve had. You should limit the amount of salt in your food to about 2 grams a day. This will help prevent most types of kidney stones. Make sure you get an adequate amount of calcium in your diet.   For calcium oxalate stones: Limit animal protein, such as meat, eggs, and fish. Limit grapefruit juice and alcohol. Limit high-oxalate foods (such as cola, tea, chocolate, spinach, rhubarb, wheat bran, and peanuts).   For uric acid stones: Limit high-purine foods, such as mushrooms, peas, beans, anchovies, meat, poultry, shellfish, and organ meats. These foods increase uric acid production.   For cystine stones: Limit high-methionine foods (fish is the most common, but eggs and meats, also). These foods increase production of cystine.   [employer] last reviewed this educational content on 4/1/2020 2000-2021 [employer]. All rights reserved. This information is not intended as a substitute for professional medical care. Always follow your healthcare professional's instructions.

## 2021-06-14 NOTE — PROGRESS NOTES
PRE-PROCEDURE DIAGNOSIS: left ureteral and kidney stones    POST-PROCEDURE DIAGNOSIS: left ureteral and kidney stones    PROCEDURE: Cystoscopy with left ureteral stent removal    HISTORY: 56 yo M with long history of nephrolithiasis (both calcium oxalate and uric acid) and multiple prior stone surgery most recently right ureteroscopy in January 2021, who presented with obstructing left proximal ureteral stone with associated acute kidney injury on CKD and underwent cystoscopy and stent placement 5/21/21, followed by ureteroscopy 6/3/2021    REVIEW OF OFFICE STUDIES (e.g., uroflow or PVR): Calculi composed primarily of:   20% calcium oxalate monohydrate, and   80% uric acid.     DESCRIPTION OF PROCEDURE: After informed consent was obtained, the patient was brought to the procedure room where he was placed in the supine position with all pressure points well padded.  The penis was prepped and draped in sterile fashion. A flexible cystoscope was introduced through a well-lubricated urethra. The stent was visualized, grasped with a flexible grasper and removed intact and discarded    The flexible cystoscope was removed and the findings were described to the patient.     ASSESSMENT AND PLAN: 56 yo M with long history of nephrolithiasis (both calcium oxalate and uric acid) and multiple prior stone surgery most recently right ureteroscopy in January 2021, who presented with obstructing left proximal ureteral stone with associated acute kidney injury on CKD and underwent cystoscopy and stent placement 5/21/21, followed by ureteroscopy 6/3/2021    Discussed stone diet with reduced animal protein for predominance of uric aid now    - follow up with litholink and renal ultrasound prior in 6-8 weeks  -referral to nephrology for medical management of stones.      Hudson Montana MD   [facility]  [phone] clinic phone

## 2021-06-15 RX ORDER — LIDOCAINE HYDROCHLORIDE 20 MG/ML
JELLY TOPICAL ONCE
Status: COMPLETED | OUTPATIENT
Start: 2021-06-15 | End: 2021-06-14

## 2021-06-15 NOTE — NURSING NOTE
"Chief Complaint   Patient presents with     Kidney Stone Related     Patient here today for Cystoscopy Stent Removal       Blood pressure (!) 146/80, pulse 96, height 1.727 m (5' 8\"), weight 108.9 kg (240 lb), SpO2 97 %. Body mass index is 36.49 kg/m .    Patient Active Problem List   Diagnosis     HLD (hyperlipidemia)     CAD (coronary artery disease)     Hypertension     Kidney stone     Morbid obesity (H)     Diabetes mellitus, type 2 (H)     Right ureteral stone     Ureteral stone     Constipation     Acute kidney injury (H)     Left ureteral stone       Allergies   Allergen Reactions     Atorvastatin Rash       Current Outpatient Medications   Medication Sig Dispense Refill     acetaminophen (TYLENOL) 500 MG tablet Take 2 tablets (1,000 mg) by mouth every 6 hours as needed for mild pain 100 tablet 0     amLODIPine (NORVASC) 5 MG tablet Take 1 tablet (5 mg) by mouth daily 30 tablet 0     aspirin 81 MG EC tablet Take 81 mg by mouth daily        docusate sodium (COLACE) 100 MG capsule Take 1 capsule (100 mg) by mouth 2 times daily 30 capsule 0     glipiZIDE (GLUCOTROL XL) 2.5 MG 24 hr tablet Take 1 tablet (2.5 mg) by mouth daily (with breakfast) 30 tablet 0     lisinopril-hydrochlorothiazide (ZESTORETIC) 20-12.5 MG tablet Take 1 tablet by mouth daily Do not resume this until instructed to do so by your primary care doctor when you kidney function has improved to your baseline.       metFORMIN (GLUCOPHAGE-XR) 500 MG 24 hr tablet Take 1 tablet (500 mg) by mouth daily (with dinner) Do not resume this until your kidney function has improved back to your baseline.       omeprazole (PRILOSEC) 20 MG DR capsule Take 20 mg by mouth daily       ondansetron (ZOFRAN ODT) 4 MG ODT tab Take 1 tablet (4 mg) by mouth every 6 hours as needed for nausea or vomiting 20 tablet 0     oxybutynin (DITROPAN) 5 MG tablet Take 1 tablet (5 mg) by mouth 3 times daily 90 tablet 0     rosuvastatin (CRESTOR) 40 MG tablet Take 40 mg by mouth " daily         Social History     Tobacco Use     Smoking status: Never Smoker     Smokeless tobacco: Never Used   Substance Use Topics     Alcohol use: No     Alcohol/week: 0.0 standard drinks     Drug use: No     Prior to the start of the procedure and with procedural staff participation, I verbally confirmed the patient s identity using two indicators, relevant allergies, that the procedure was appropriate and matched the consent or emergent situation, and that the correct equipment/implants were available. Immediately prior to starting the procedure I conducted the Time Out with the procedural staff and re-confirmed the patient s name, procedure, and site/side. I have wiped the patient off with the povidone-Iodine solution, draped them,  used Lidocaine hydrochloride jelly, and instilled sterile water into the bladder. (The Joint Commission universal protocol was followed.)  Yes    Sedation (Moderate or Deep): None    5mL 2% lidocaine hydrochloride Urojet instilled into urethra.    NDC# [address]-5  Lot #: [id number]  Expiration Date:  12/22        [facility]  6/14/2021

## 2021-08-28 ENCOUNTER — HEALTH MAINTENANCE LETTER (OUTPATIENT)
Age: 56
End: 2021-08-28

## 2021-10-19 ENCOUNTER — TRANSFERRED RECORDS (OUTPATIENT)
Dept: HEALTH INFORMATION MANAGEMENT | Facility: CLINIC | Age: 56
End: 2021-10-19

## 2021-10-20 ENCOUNTER — PREP FOR PROCEDURE (OUTPATIENT)
Dept: UROLOGY | Facility: CLINIC | Age: 56
End: 2021-10-20

## 2021-10-20 DIAGNOSIS — N20.1 OBSTRUCTION OF RIGHT URETEROPELVIC JUNCTION (UPJ) DUE TO STONE: Primary | ICD-10-CM

## 2021-10-21 DIAGNOSIS — Z11.59 ENCOUNTER FOR SCREENING FOR OTHER VIRAL DISEASES: ICD-10-CM

## 2021-10-22 ENCOUNTER — VIRTUAL VISIT (OUTPATIENT)
Dept: UROLOGY | Facility: CLINIC | Age: 56
End: 2021-10-22
Payer: COMMERCIAL

## 2021-10-22 VITALS — WEIGHT: 240 LBS | BODY MASS INDEX: 36.37 KG/M2 | HEIGHT: 68 IN

## 2021-10-22 DIAGNOSIS — N20.1 RIGHT URETERAL STONE: Primary | ICD-10-CM

## 2021-10-22 PROCEDURE — 99213 OFFICE O/P EST LOW 20 MIN: CPT | Mod: 95 | Performed by: STUDENT IN AN ORGANIZED HEALTH CARE EDUCATION/TRAINING PROGRAM

## 2021-10-22 ASSESSMENT — MIFFLIN-ST. JEOR: SCORE: 1908.13

## 2021-10-22 ASSESSMENT — PAIN SCALES - GENERAL: PAINLEVEL: NO PAIN (0)

## 2021-10-22 NOTE — LETTER
"10/22/2021       RE: Ashutosh Moraes  [address]  [facility] [address]     Dear Colleague,    Thank you for referring your patient, Ashutosh Moraes, to the [facility]OLOG[facility]INIC [place] at [facility]. Please see a copy of my visit note below.    Nomi is a 55 year old who is being evaluated via a billable video visit.      How would you like to obtain your AVS? MyChart  If the video visit is dropped, the invitation should be resent by: Text to cell phone: [phone]  Will anyone else be joining your video visit? No      Video Start Time: 10:54 AM    CHIEF COMPLAINT   Ashutosh Moraes who is a 55 year old male returns today for follow-up of nephrolithiasis.      HPI   Ashutosh Moraes is a 55 year old male who presents with a long history of nephrolithiasis with multiple stone interventions in the past, most recently s/p left ureteroscopy 6/3/2021. He passed a stone on the right side spontaneously over the summer. Then while traveling in [place] had a right renal colic episode and found to have a large right proximal stone    Pain has improved and now is minimal. Denies fever or chills, or nausea or vomiting    PHYSICAL EXAM  Patient is a 55 year old  male   Vitals: Height 1.727 m (5' 8\"), weight 108.9 kg (240 lb).  Body mass index is 36.49 kg/m .  General Appearance Adult:   Alert, no acute distress, oriented  HENT: throat/mouth:normal, good dentition  Lungs: no respiratory distress, or pursed lip breathing  Heart: No obvious jugular venous distension present  Musculoskeltal: extremities normal, no peripheral edema  Skin: no suspicious lesions or rashes  Neuro: Alert, oriented, speech and mentation normal  Psych: affect and mood normal    All pertinent imaging reviewed:    CT abd/pelvis 10/19/2021        I reviewed images personally. ~7-8 mm right proximal stone with additional nonobstructive stone burden in the right kidney. Small amount of " residual stone in left kidney    ASSESSMENT and PLAN   55 year old male who presents with a long history of nephrolithiasis with multiple stone interventions in the past, most recently s/p left ureteroscopy 6/3/2021. He passed a stone on the right side spontaneously over the summer. Then while traveling in [place] had a right renal colic episode and found to have a large right proximal stone    Discussed ureteroscopic management of the right ureteral as well as right renal stones. Risks including need for secondary procedure, infection, bleeding, stent pain and irritation, ureteral injury discussed. Will plan to monitor small left sided stones for now. Return precautions including uncontrolled pain/n/v or fever/chills discussed    Plan for cystoscopy, right ureteroscopy with laser lithotripsy and stone basketing, right retrograde pyelogram, right ureteral stent placement      Hudson Montana MD   [facility]  [facility] Phone: [phone]    Video-Visit Details    Type of service:  Video Visit    Video End Time:11:01 AM    Originating Location (pt. Location): Home    Distant Location (provider location):  [facility] UROLOGY [facility]     Platform used for Video Visit: [employer]

## 2021-10-22 NOTE — PROGRESS NOTES
"Nomi is a 55 year old who is being evaluated via a billable video visit.      How would you like to obtain your AVS? MyChart  If the video visit is dropped, the invitation should be resent by: Text to cell phone: [phone]  Will anyone else be joining your video visit? No      Video Start Time: 10:54 AM    CHIEF COMPLAINT   Ashutosh Moraes who is a 55 year old male returns today for follow-up of nephrolithiasis.      HPI   Ashutosh Moraes is a 55 year old male who presents with a long history of nephrolithiasis with multiple stone interventions in the past, most recently s/p left ureteroscopy 6/3/2021. He passed a stone on the right side spontaneously over the summer. Then while traveling in [place] had a right renal colic episode and found to have a large right proximal stone    Pain has improved and now is minimal. Denies fever or chills, or nausea or vomiting    PHYSICAL EXAM  Patient is a 55 year old  male   Vitals: Height 1.727 m (5' 8\"), weight 108.9 kg (240 lb).  Body mass index is 36.49 kg/m .  General Appearance Adult:   Alert, no acute distress, oriented  HENT: throat/mouth:normal, good dentition  Lungs: no respiratory distress, or pursed lip breathing  Heart: No obvious jugular venous distension present  Musculoskeltal: extremities normal, no peripheral edema  Skin: no suspicious lesions or rashes  Neuro: Alert, oriented, speech and mentation normal  Psych: affect and mood normal    All pertinent imaging reviewed:    CT abd/pelvis 10/19/2021        I reviewed images personally. ~7-8 mm right proximal stone with additional nonobstructive stone burden in the right kidney. Small amount of residual stone in left kidney    ASSESSMENT and PLAN   55 year old male who presents with a long history of nephrolithiasis with multiple stone interventions in the past, most recently s/p left ureteroscopy 6/3/2021. He passed a stone on the right side spontaneously over the summer. Then while traveling in [place] " had a right renal colic episode and found to have a large right proximal stone    Discussed ureteroscopic management of the right ureteral as well as right renal stones. Risks including need for secondary procedure, infection, bleeding, stent pain and irritation, ureteral injury discussed. Will plan to monitor small left sided stones for now. Return precautions including uncontrolled pain/n/v or fever/chills discussed    Plan for cystoscopy, right ureteroscopy with laser lithotripsy and stone basketing, right retrograde pyelogram, right ureteral stent placement      Hudson Montana MD   [facility]rology  [facility] Phone: [phone]    Video-Visit Details    Type of service:  Video Visit    Video End Time:11:01 AM    Originating Location (pt. Location): Home    Distant Location (provider location):  [facility] UROLOGY [facility]     Platform used for Video Visit: [employer]

## 2021-10-25 ENCOUNTER — LAB (OUTPATIENT)
Dept: LAB | Facility: CLINIC | Age: 56
End: 2021-10-25
Payer: COMMERCIAL

## 2021-10-25 DIAGNOSIS — Z11.59 ENCOUNTER FOR SCREENING FOR OTHER VIRAL DISEASES: ICD-10-CM

## 2021-10-25 PROCEDURE — U0005 INFEC AGEN DETEC AMPLI PROBE: HCPCS

## 2021-10-25 RX ORDER — DULAGLUTIDE 1.5 MG/.5ML
1.5 INJECTION, SOLUTION SUBCUTANEOUS
COMMUNITY
Start: 2021-10-22 | End: 2023-03-27

## 2021-10-25 RX ORDER — TAMSULOSIN HYDROCHLORIDE 0.4 MG/1
0.4 CAPSULE ORAL DAILY
Status: ON HOLD | COMMUNITY
Start: 2021-10-19 | End: 2021-10-28

## 2021-10-25 RX ORDER — OXYCODONE HYDROCHLORIDE 5 MG/1
5 TABLET ORAL EVERY 6 HOURS PRN
Status: ON HOLD | COMMUNITY
Start: 2021-10-19 | End: 2021-10-28

## 2021-10-26 LAB — SARS-COV-2 RNA RESP QL NAA+PROBE: NEGATIVE

## 2021-10-28 ENCOUNTER — APPOINTMENT (OUTPATIENT)
Dept: GENERAL RADIOLOGY | Facility: CLINIC | Age: 56
End: 2021-10-28
Attending: STUDENT IN AN ORGANIZED HEALTH CARE EDUCATION/TRAINING PROGRAM
Payer: COMMERCIAL

## 2021-10-28 ENCOUNTER — HOSPITAL ENCOUNTER (OUTPATIENT)
Facility: CLINIC | Age: 56
Discharge: HOME | End: 2021-10-28
Attending: STUDENT IN AN ORGANIZED HEALTH CARE EDUCATION/TRAINING PROGRAM | Admitting: STUDENT IN AN ORGANIZED HEALTH CARE EDUCATION/TRAINING PROGRAM
Payer: COMMERCIAL

## 2021-10-28 ENCOUNTER — ANESTHESIA (OUTPATIENT)
Dept: SURGERY | Facility: CLINIC | Age: 56
End: 2021-10-28
Payer: COMMERCIAL

## 2021-10-28 ENCOUNTER — ANESTHESIA EVENT (OUTPATIENT)
Dept: SURGERY | Facility: CLINIC | Age: 56
End: 2021-10-28
Payer: COMMERCIAL

## 2021-10-28 VITALS
BODY MASS INDEX: 36.36 KG/M2 | RESPIRATION RATE: 16 BRPM | DIASTOLIC BLOOD PRESSURE: 93 MMHG | WEIGHT: 239.9 LBS | TEMPERATURE: 97.5 F | SYSTOLIC BLOOD PRESSURE: 139 MMHG | OXYGEN SATURATION: 97 % | HEART RATE: 88 BPM | HEIGHT: 68 IN

## 2021-10-28 DIAGNOSIS — N20.1 RIGHT URETERAL STONE: Primary | ICD-10-CM

## 2021-10-28 LAB
GLUCOSE BLDC GLUCOMTR-MCNC: 156 MG/DL (ref 70–99)
GLUCOSE BLDC GLUCOMTR-MCNC: 162 MG/DL (ref 70–99)

## 2021-10-28 PROCEDURE — 82365 CALCULUS SPECTROSCOPY: CPT | Performed by: STUDENT IN AN ORGANIZED HEALTH CARE EDUCATION/TRAINING PROGRAM

## 2021-10-28 PROCEDURE — C2617 STENT, NON-COR, TEM W/O DEL: HCPCS | Performed by: STUDENT IN AN ORGANIZED HEALTH CARE EDUCATION/TRAINING PROGRAM

## 2021-10-28 PROCEDURE — 272N000002 HC OR SUPPLY OTHER OPNP: Performed by: STUDENT IN AN ORGANIZED HEALTH CARE EDUCATION/TRAINING PROGRAM

## 2021-10-28 PROCEDURE — 250N000011 HC RX IP 250 OP 636: Performed by: NURSE ANESTHETIST, CERTIFIED REGISTERED

## 2021-10-28 PROCEDURE — 258N000001 HC RX 258: Performed by: STUDENT IN AN ORGANIZED HEALTH CARE EDUCATION/TRAINING PROGRAM

## 2021-10-28 PROCEDURE — C1769 GUIDE WIRE: HCPCS | Performed by: STUDENT IN AN ORGANIZED HEALTH CARE EDUCATION/TRAINING PROGRAM

## 2021-10-28 PROCEDURE — 82962 GLUCOSE BLOOD TEST: CPT

## 2021-10-28 PROCEDURE — 258N000003 HC RX IP 258 OP 636: Performed by: ANESTHESIOLOGY

## 2021-10-28 PROCEDURE — 999N000179 XR SURGERY CARM FLUORO LESS THAN 5 MIN W STILLS: Mod: TC

## 2021-10-28 PROCEDURE — 250N000009 HC RX 250: Performed by: NURSE ANESTHETIST, CERTIFIED REGISTERED

## 2021-10-28 PROCEDURE — 255N000002 HC RX 255 OP 636: Performed by: STUDENT IN AN ORGANIZED HEALTH CARE EDUCATION/TRAINING PROGRAM

## 2021-10-28 PROCEDURE — 370N000017 HC ANESTHESIA TECHNICAL FEE, PER MIN: Performed by: STUDENT IN AN ORGANIZED HEALTH CARE EDUCATION/TRAINING PROGRAM

## 2021-10-28 PROCEDURE — 272N000001 HC OR GENERAL SUPPLY STERILE: Performed by: STUDENT IN AN ORGANIZED HEALTH CARE EDUCATION/TRAINING PROGRAM

## 2021-10-28 PROCEDURE — 250N000011 HC RX IP 250 OP 636: Performed by: ANESTHESIOLOGY

## 2021-10-28 PROCEDURE — 250N000009 HC RX 250: Performed by: STUDENT IN AN ORGANIZED HEALTH CARE EDUCATION/TRAINING PROGRAM

## 2021-10-28 PROCEDURE — 710N000012 HC RECOVERY PHASE 2, PER MINUTE: Performed by: STUDENT IN AN ORGANIZED HEALTH CARE EDUCATION/TRAINING PROGRAM

## 2021-10-28 PROCEDURE — 52356 CYSTO/URETERO W/LITHOTRIPSY: CPT | Mod: RT | Performed by: STUDENT IN AN ORGANIZED HEALTH CARE EDUCATION/TRAINING PROGRAM

## 2021-10-28 PROCEDURE — 250N000011 HC RX IP 250 OP 636: Performed by: STUDENT IN AN ORGANIZED HEALTH CARE EDUCATION/TRAINING PROGRAM

## 2021-10-28 PROCEDURE — 710N000009 HC RECOVERY PHASE 1, LEVEL 1, PER MIN: Performed by: STUDENT IN AN ORGANIZED HEALTH CARE EDUCATION/TRAINING PROGRAM

## 2021-10-28 PROCEDURE — 360N000084 HC SURGERY LEVEL 4 W/ FLUORO, PER MIN: Performed by: STUDENT IN AN ORGANIZED HEALTH CARE EDUCATION/TRAINING PROGRAM

## 2021-10-28 PROCEDURE — 74420 UROGRAPHY RTRGR +-KUB: CPT | Mod: 26 | Performed by: STUDENT IN AN ORGANIZED HEALTH CARE EDUCATION/TRAINING PROGRAM

## 2021-10-28 PROCEDURE — 999N000141 HC STATISTIC PRE-PROCEDURE NURSING ASSESSMENT: Performed by: STUDENT IN AN ORGANIZED HEALTH CARE EDUCATION/TRAINING PROGRAM

## 2021-10-28 DEVICE — STENT URETERAL POLARIS ULTRA 6FRX26CM M0061921330: Type: IMPLANTABLE DEVICE | Site: ABDOMEN | Status: FUNCTIONAL

## 2021-10-28 RX ORDER — LABETALOL HYDROCHLORIDE 5 MG/ML
10 INJECTION, SOLUTION INTRAVENOUS
Status: DISCONTINUED | OUTPATIENT
Start: 2021-10-28 | End: 2021-10-28 | Stop reason: HOSPADM

## 2021-10-28 RX ORDER — CEFAZOLIN SODIUM/WATER 2 G/20 ML
2 SYRINGE (ML) INTRAVENOUS SEE ADMIN INSTRUCTIONS
Status: DISCONTINUED | OUTPATIENT
Start: 2021-10-28 | End: 2021-10-28 | Stop reason: HOSPADM

## 2021-10-28 RX ORDER — ONDANSETRON 2 MG/ML
4 INJECTION INTRAMUSCULAR; INTRAVENOUS EVERY 30 MIN PRN
Status: DISCONTINUED | OUTPATIENT
Start: 2021-10-28 | End: 2021-10-28 | Stop reason: HOSPADM

## 2021-10-28 RX ORDER — SODIUM CHLORIDE, SODIUM LACTATE, POTASSIUM CHLORIDE, CALCIUM CHLORIDE 600; 310; 30; 20 MG/100ML; MG/100ML; MG/100ML; MG/100ML
INJECTION, SOLUTION INTRAVENOUS CONTINUOUS
Status: DISCONTINUED | OUTPATIENT
Start: 2021-10-28 | End: 2021-10-28 | Stop reason: HOSPADM

## 2021-10-28 RX ORDER — OXYCODONE HYDROCHLORIDE 5 MG/1
5 TABLET ORAL EVERY 4 HOURS PRN
Status: CANCELLED | OUTPATIENT
Start: 2021-10-28

## 2021-10-28 RX ORDER — HYDROMORPHONE HCL IN WATER/PF 6 MG/30 ML
0.4 PATIENT CONTROLLED ANALGESIA SYRINGE INTRAVENOUS EVERY 5 MIN PRN
Status: DISCONTINUED | OUTPATIENT
Start: 2021-10-28 | End: 2021-10-28 | Stop reason: HOSPADM

## 2021-10-28 RX ORDER — NALOXONE HYDROCHLORIDE 0.4 MG/ML
0.2 INJECTION, SOLUTION INTRAMUSCULAR; INTRAVENOUS; SUBCUTANEOUS
Status: DISCONTINUED | OUTPATIENT
Start: 2021-10-28 | End: 2021-10-28 | Stop reason: HOSPADM

## 2021-10-28 RX ORDER — LIDOCAINE 40 MG/G
CREAM TOPICAL
Status: DISCONTINUED | OUTPATIENT
Start: 2021-10-28 | End: 2021-10-28 | Stop reason: HOSPADM

## 2021-10-28 RX ORDER — FENTANYL CITRATE 50 UG/ML
INJECTION, SOLUTION INTRAMUSCULAR; INTRAVENOUS PRN
Status: DISCONTINUED | OUTPATIENT
Start: 2021-10-28 | End: 2021-10-28

## 2021-10-28 RX ORDER — ONDANSETRON 4 MG/1
4 TABLET, ORALLY DISINTEGRATING ORAL EVERY 30 MIN PRN
Status: DISCONTINUED | OUTPATIENT
Start: 2021-10-28 | End: 2021-10-28 | Stop reason: HOSPADM

## 2021-10-28 RX ORDER — HYDROMORPHONE HYDROCHLORIDE 1 MG/ML
.3-.5 INJECTION, SOLUTION INTRAMUSCULAR; INTRAVENOUS; SUBCUTANEOUS EVERY 5 MIN PRN
Status: DISCONTINUED | OUTPATIENT
Start: 2021-10-28 | End: 2021-10-28 | Stop reason: HOSPADM

## 2021-10-28 RX ORDER — LABETALOL HYDROCHLORIDE 5 MG/ML
10 INJECTION, SOLUTION INTRAVENOUS
Status: COMPLETED | OUTPATIENT
Start: 2021-10-28 | End: 2021-10-28

## 2021-10-28 RX ORDER — DOCUSATE SODIUM 100 MG/1
100 CAPSULE, LIQUID FILLED ORAL 2 TIMES DAILY
Qty: 30 CAPSULE | Refills: 0 | Status: SHIPPED | OUTPATIENT
Start: 2021-10-28 | End: 2023-03-27

## 2021-10-28 RX ORDER — HYDRALAZINE HYDROCHLORIDE 20 MG/ML
2.5-5 INJECTION INTRAMUSCULAR; INTRAVENOUS EVERY 10 MIN PRN
Status: DISCONTINUED | OUTPATIENT
Start: 2021-10-28 | End: 2021-10-28 | Stop reason: HOSPADM

## 2021-10-28 RX ORDER — HALOPERIDOL 5 MG/ML
1 INJECTION INTRAMUSCULAR
Status: DISCONTINUED | OUTPATIENT
Start: 2021-10-28 | End: 2021-10-28 | Stop reason: HOSPADM

## 2021-10-28 RX ORDER — NALOXONE HYDROCHLORIDE 0.4 MG/ML
0.4 INJECTION, SOLUTION INTRAMUSCULAR; INTRAVENOUS; SUBCUTANEOUS
Status: DISCONTINUED | OUTPATIENT
Start: 2021-10-28 | End: 2021-10-28 | Stop reason: HOSPADM

## 2021-10-28 RX ORDER — ONDANSETRON 2 MG/ML
INJECTION INTRAMUSCULAR; INTRAVENOUS PRN
Status: DISCONTINUED | OUTPATIENT
Start: 2021-10-28 | End: 2021-10-28

## 2021-10-28 RX ORDER — PROPOFOL 10 MG/ML
INJECTION, EMULSION INTRAVENOUS PRN
Status: DISCONTINUED | OUTPATIENT
Start: 2021-10-28 | End: 2021-10-28

## 2021-10-28 RX ORDER — HYDRALAZINE HYDROCHLORIDE 20 MG/ML
5 INJECTION INTRAMUSCULAR; INTRAVENOUS ONCE
Status: DISCONTINUED | OUTPATIENT
Start: 2021-10-28 | End: 2021-10-28 | Stop reason: HOSPADM

## 2021-10-28 RX ORDER — FENTANYL CITRATE 50 UG/ML
50 INJECTION, SOLUTION INTRAMUSCULAR; INTRAVENOUS EVERY 5 MIN PRN
Status: DISCONTINUED | OUTPATIENT
Start: 2021-10-28 | End: 2021-10-28 | Stop reason: HOSPADM

## 2021-10-28 RX ORDER — LIDOCAINE HYDROCHLORIDE 10 MG/ML
INJECTION, SOLUTION INFILTRATION; PERINEURAL PRN
Status: DISCONTINUED | OUTPATIENT
Start: 2021-10-28 | End: 2021-10-28

## 2021-10-28 RX ORDER — TAMSULOSIN HYDROCHLORIDE 0.4 MG/1
0.4 CAPSULE ORAL DAILY
Qty: 14 CAPSULE | Refills: 0 | Status: SHIPPED | OUTPATIENT
Start: 2021-10-28 | End: 2022-12-16

## 2021-10-28 RX ORDER — ATROPA BELLADONNA AND OPIUM 16.2; 3 MG/1; MG/1
SUPPOSITORY RECTAL PRN
Status: DISCONTINUED | OUTPATIENT
Start: 2021-10-28 | End: 2021-10-28 | Stop reason: HOSPADM

## 2021-10-28 RX ORDER — OXYCODONE HYDROCHLORIDE 5 MG/1
5 TABLET ORAL EVERY 6 HOURS PRN
Qty: 6 TABLET | Refills: 0 | Status: SHIPPED | OUTPATIENT
Start: 2021-10-28 | End: 2021-10-31

## 2021-10-28 RX ORDER — ACETAMINOPHEN 500 MG
1000 TABLET ORAL EVERY 6 HOURS PRN
Qty: 100 TABLET | Refills: 0 | Status: SHIPPED | OUTPATIENT
Start: 2021-10-28 | End: 2023-03-27

## 2021-10-28 RX ORDER — FENTANYL CITRATE 50 UG/ML
25-50 INJECTION, SOLUTION INTRAMUSCULAR; INTRAVENOUS
Status: DISCONTINUED | OUTPATIENT
Start: 2021-10-28 | End: 2021-10-28 | Stop reason: HOSPADM

## 2021-10-28 RX ORDER — CEFAZOLIN SODIUM/WATER 2 G/20 ML
2 SYRINGE (ML) INTRAVENOUS
Status: COMPLETED | OUTPATIENT
Start: 2021-10-28 | End: 2021-10-28

## 2021-10-28 RX ADMIN — LABETALOL HYDROCHLORIDE 10 MG: 5 INJECTION, SOLUTION INTRAVENOUS at 13:29

## 2021-10-28 RX ADMIN — SODIUM CHLORIDE, POTASSIUM CHLORIDE, SODIUM LACTATE AND CALCIUM CHLORIDE: 600; 310; 30; 20 INJECTION, SOLUTION INTRAVENOUS at 11:45

## 2021-10-28 RX ADMIN — HYDRALAZINE HYDROCHLORIDE 5 MG: 20 INJECTION INTRAMUSCULAR; INTRAVENOUS at 13:52

## 2021-10-28 RX ADMIN — LIDOCAINE HYDROCHLORIDE 50 MG: 10 INJECTION, SOLUTION INFILTRATION; PERINEURAL at 12:03

## 2021-10-28 RX ADMIN — FENTANYL CITRATE 100 MCG: 50 INJECTION, SOLUTION INTRAMUSCULAR; INTRAVENOUS at 12:03

## 2021-10-28 RX ADMIN — ONDANSETRON HYDROCHLORIDE 4 MG: 2 INJECTION, SOLUTION INTRAVENOUS at 12:56

## 2021-10-28 RX ADMIN — HYDRALAZINE HYDROCHLORIDE 5 MG: 20 INJECTION INTRAMUSCULAR; INTRAVENOUS at 14:47

## 2021-10-28 RX ADMIN — SODIUM CHLORIDE, POTASSIUM CHLORIDE, SODIUM LACTATE AND CALCIUM CHLORIDE: 600; 310; 30; 20 INJECTION, SOLUTION INTRAVENOUS at 11:53

## 2021-10-28 RX ADMIN — Medication 2 G: at 12:17

## 2021-10-28 RX ADMIN — SODIUM CHLORIDE, POTASSIUM CHLORIDE, SODIUM LACTATE AND CALCIUM CHLORIDE: 600; 310; 30; 20 INJECTION, SOLUTION INTRAVENOUS at 13:55

## 2021-10-28 RX ADMIN — ONDANSETRON 4 MG: 2 INJECTION INTRAMUSCULAR; INTRAVENOUS at 13:34

## 2021-10-28 RX ADMIN — PROPOFOL 200 MG: 10 INJECTION, EMULSION INTRAVENOUS at 12:03

## 2021-10-28 ASSESSMENT — MIFFLIN-ST. JEOR: SCORE: 1907.68

## 2021-10-28 NOTE — ANESTHESIA PROCEDURE NOTES
Airway       Patient location during procedure: OR       Procedure Start/Stop Times: 10/28/2021 12:05 PM  Staff -        Anesthesiologist:  Priya Baez MD       CRNA: Liset Welch APRN CRNA       Performed By: CRNA  Consent for Airway        Urgency: elective  Indications and Patient Condition       Indications for airway management: ivy-procedural       Induction type:intravenous       Mask difficulty assessment: 0 - not attempted    Final Airway Details       Final airway type: supraglottic airway    Supraglottic Airway Details        Type: LMA       Brand: I-Gel       LMA size: 5    Post intubation assessment        Placement verified by: capnometry, equal breath sounds and chest rise        Number of attempts at approach: 1       Number of other approaches attempted: 0       Secured with: plastic tape       Ease of procedure: easy       Dentition: Unchanged

## 2021-10-28 NOTE — OP NOTE
Operative Note    Pre-operative diagnosis: Obstruction of right ureteropelvic junction (UPJ) due to stone [N20.1]   Post-operative diagnosis Right ureteral and renal stones   Procedure: Procedure(s):  Cystoscopy  Right ureteroscopy with laser lithotripsy   Right ureteroscopy with stone basketing  right retrograde pyelogram  right ureteral stent placement   Fluoroscopic interpretation < 1 hour physician time   Surgeon: Hudson Montana MD   Assistants(s): none   Anesthesia: General    Estimated blood loss: Minimal    Total IV fluids: (See anesthesia record)   Blood transfusion: No transfusion was given during surgery   Total urine output: Not measured   Drains: Right ureteral stent   Specimens: ID Type Source Tests Collected by Time Destination   A : Right kidney stone Calculus/Stone Kidney, Right STONE ANALYSIS Hudson Montana MD 10/28/2021  1:02 PM         Implants: Implant Name Type Inv. Item Serial No.  Lot No. LRB No. Used Action   STENT URETERAL POLARIS ULTRA [id number] - [id number] Stent STENT URETERAL POLARIS ULTRA [id number]  [place] [id number] Right 1 Implanted      Findings: 8 mm right proximal ureteral stone, additional 6 mm right lower pole stone and 2 mm embedded upper pole stone, laser fragmented and basketed, estimate 95% stone free   Complications: None.   Condition: Stable       Description of procedure:  55-year-old man with a history of nephrolithiasis who presented to outside hospital with right renal colic and was found to have a large right proximal ureteral stone as well as nonobstructing nephrolithiasis in the right kidney.  I discussed ureteroscopic management with the ureteroscopy, laser lithotripsy and stone basketing, right stent placement.  Risks including ureteral injury, need for secondary procedure, infection, hematuria, stent pain and irritation were discussed.  Informed consent was obtained.    The  patient was brought to operating room #14.  Ancef antibiotics were given prior to procedure.  General anesthesia was induced, he was placed in dorsal lithotomy position, prepped and draped in standard sterile fashion.  A timeout was performed.    A 22 Ethiopian Storz cystoscope was assembled and passed through the urethra into the bladder.  The right ureteral orifice was identified and cannulated with a 5 Ethiopian tiger tail catheter.  The  film showed no obvious shadowing radiodensities.  Gentle retrograde pyelogram was performed which showed a contrast cut off in the proximal ureter consistent with obstructing stone.  A 0.035 inch stiff shaft Glidewire was passed up to the renal pelvis under fluoroscopic guidance and the tiger tail catheter was removed.  The scope was removed and the wire was clipped the drapes as a safety wire.  A Storz long semirigid ureteroscope was assembled and passed through the urethra, into the bladder at the right ureteral orifice.  The ureteroscope was passed all the way up to the proximal ureter where a large 8 mm proximal ureteral stone was encountered which appeared to be impacted.  Lumenis holmium laser was used to fragment the stone into multiple pieces.  Some pieces were able to be basketed out however the remainder of the stone burden was retropulsed up into the kidney.  A second 0.035 inch stiff shaft Glidewire was passed through the scope up to the renal pelvis and the scope was backloaded off the wire.  Over the working wire a [employer] Navigator 11/13 x 46 cm ureteral access sheath was passed up to the proximal ureter under fluoroscopic guidance and the wire and obturator were removed.  An Olympus flexible digital ureteroscope was passed up to the renal pelvis and complete pyeloscopy revealed numerous stone fragments within the renal pelvis as well as a 6 mm left lower pole stone.  The laser was then used to fragment the lower pole stone into multiple pieces.  All  stone pieces were basketed out using the Brooks halo basket.  There was a small 2 mm upper pole stone which was embedded in the renal papilla which was also lasered out.  At the end of the procedure there were no significant stone fragments greater than 1 mm remaining within the renal collecting system.  There was a small amount of dust remaining.  Estimate 95% stone clearance. Repeat retrograde pyelogram was performed to provide landing zone for the stent. Pullout ureteroscopy revealed the ureter to be in good condition with no tears or lacerations.  Ureteroscope and UAS were removed. A stent was then placed in the usual fashion under cystoscopic and fluoroscopic guidance with good coils noted proximally and distally. Bladder was drained and scope removed. B&O suppository was placed per rectum. Patient was cleaned up, awoken from anesthesia and brought to PACU in stable condition. He will return for stent removal as an outpatient.    Hudson Montana MD   [facility]  [phone] clinic phone

## 2021-10-28 NOTE — ANESTHESIA CARE TRANSFER NOTE
Patient: Ashutosh Moraes    Procedure: Procedure(s):  cystoscopy, right ureteroscopy with laser lithotripsy and stone basketing, right retrograde pyelogram, right ureteral stent placement       Diagnosis: Obstruction of right ureteropelvic junction (UPJ) due to stone [N20.1]  Diagnosis Additional Information: No value filed.    Anesthesia Type:   General     Note:    Oropharynx: oropharynx clear of all foreign objects  Level of Consciousness: awake  Oxygen Supplementation: blow-by O2  Level of Supplemental Oxygen (L/min / FiO2): 4  Independent Airway: airway patency satisfactory and stable  Dentition: dentition unchanged  Vital Signs Stable: post-procedure vital signs reviewed and stable  Report to RN Given: handoff report given  Patient transferred to: PACU    Handoff Report: Identifed the Patient, Identified the Reponsible Provider, Reviewed the pertinent medical history, Discussed the surgical course, Reviewed Intra-OP anesthesia mangement and issues during anesthesia, Set expectations for post-procedure period and Allowed opportunity for questions and acknowledgement of understanding      Vitals:  Vitals Value Taken Time   /105 10/28/21 1310   Temp     Pulse 78 10/28/21 1311   Resp 8 10/28/21 1311   SpO2 99 % 10/28/21 1311   Vitals shown include unvalidated device data.    Electronically Signed By: RIN Elena CRNA  October 28, 2021  1:11 PM

## 2021-10-28 NOTE — DISCHARGE INSTRUCTIONS
POSTOPERATIVE INSTRUCTIONS    Diagnosis-------------------------------   Right ureteral and renal stones    Procedure-------------------------------  Procedure(s) (LRB):  cystoscopy, right ureteroscopy with laser lithotripsy and stone basketing, right retrograde pyelogram, right ureteral stent placement (Right)      Findings--------------------------------  8 mm right proximal ureteral stone, additional 6 mm right lower pole stone and 2 mm embedded upper pole stone, laser fragmented and basketed, estimate 95% stone free    Home-going instructions-----------------         Activity Limitation:     - No driving or operating heavy machinery while on narcotic pain medication.     FOLLOW THESE INSTRUCTIONS AS INDICATED BELOW:  - Observe operative area for signs of excessive bleeding.  - You may shower.  - Increase fluid intake to promote clear urine.  - Resume usual diet as tolerated    What to expect while recovering-----------  - You may experience some intermittent bleeding that makes your urine pink or cherry colored. This is normal.  - However, if you are unable to urinate, passing large amount of clots, have ayan blood in your urine, or have a temperature >101 degrees, call the urology nurse on call, or present to your nearest emergency department.  - You are encouraged to walk daily, and have no activity restrictions.   - A URETERAL STENT has been placed that allows urine to flow unobstructed from your kidney into your bladder.  The stent has a curl in the kidney and a curl in the bladder.  The curl in the bladder can cause some urgency and frequency of urination as well as some mild blood in the urine.  The curl in the kidney can cause some mild flank discomfort.  This may be more noticeable when you urinate.  A URETERAL STENT is meant to be left in temporarily.  It must be removed or changed no later than 3 months after its insertion.  If it's not removed it can result in stone overgrowth on the stent that can  cause pain, infection, and can be very difficult to remove.      Discharge Medications/instructions:     - Flomax (tamsulosin) to be taken daily until stent is removed    - Antibiotics (ciprofloxacin) are to be taken with you to your scheduled return visit for stent removal    - Take Tylenol 1000mg every 6 hours for pain    - Take Oxycodone 5mg every 6 hours only for break through pain    - Take Colace while taking Oxycodone to prevent constipation      Questions/concerns------------------------  [facility]: [phone]    Future appointments  Follow up 11/8/2021 as scheduled for stent removal in the office      Hudson Montana MD     CYSTOSCOPY DISCHARGE INSTRUCTIONS  [facility] UROLOGY  SABIHA ANDRADE, DAVID, LUIS ANTONIO, PRISCILLA & KATTY  [phone]    YOU MAY GO BACK TO YOUR NORMAL DIET AND ACTIVITY, UNLESS YOUR DOCTOR TELLS YOU NOT TO.    FOR THE NEXT TWO DAYS, YOU MAY NOTICE:    SOME BLOOD IN YOUR URINE.  SOME BURNING WHEN YOU URINATE.  AN URGE TO URINATE MORE OFTEN.  BLADDER SPASMS.    THESE ARE NORMAL AFTER THE PROCEDURE.  THEY SHOULD GO AWAY AFTER A DAY OR TWO.  TO RELIEVE THESE PROBLEMS:     DRINK 6 TO 8 LARGE GLASSES OF WATER EACH DAY (INCLUDES DRINKS AT MEALS).  THIS WILL HELP CLEAR THE URINE.    TAKE WARM BATHS TO RELIEVE PAIN AND BLADDER SPASMS.  DO NOT ADD ANYTHING TO THE BATH WATER.    YOUR DOCTOR MAY PRESCRIBE PAIN MEDICINE.  YOU MAY ALSO TAKE TYLENOL (ACETAMINOPHEN) FOR PAIN.    CALL YOUR SURGEON IF YOU HAVE:    A FEVER OVER 101 DEGREES.  CHECK YOUR TEMPERATURE UNDER YOUR TONGUE.    CHILLS.    FAILURE TO URINATE (NO URINE COMES OUT WHEN YOU TRY TO USE THE TOILET).  TRY SOAKING IN A BATHTUB FULL OF WARM WATER.  IF STILL NO URINE, CALL YOUR DOCTOR.    A LOT OF BLOOD IN THE URINE, OR BLOOD CLOTS LARGER THAN A NICKEL.      PAIN IN THE BACK OR BELLY AREA (ABDOMEN).    PAIN OR SPASMS THAT ARE NOT RELIEVED BY WARM TUB BATHS AND PAIN MEDICINE.      SEVERE PAIN, BURNING OR  OTHER PROBLEMS WHILE PASSING URINE.    PAIN THAT GETS WORSE AFTER TWO DAYS.          STENT INFORMATION/DISCHARGE INSTRUCTIONS  [facility] UROLOGY  SABIHA ANDRADE, DAVID, LUIS ANTONIO, PRISCILLA & KATTY  [phone]    During surgery, a stent may be placed in the ureter.  The ureter is the tube that drains urine from the kidney to the bladder.  The stent is placed to dilate (open) the ureter so stone fragments can pass easily through the ureter or to decrease ureteral swelling after surgery or to relieve an obstruction.      The stent is made of silicone.  The upper end of the stent curls in the kidney while the lower end rests in the bladder.    While the stent is in place you may experience the following symptoms:  Blood and/or small blood clots in the urine  Bladder spasms (frequency and urgency of urination)  Discomfort or aching in the back or side where the stent is  Burning or discomfort at the end of urine stream    To decrease these symptoms you should:  Take antispasmodic medication as prescribed (Detrol, Ditropan, etc.)  Drink plenty of fluids but avoid caffeine and citrus (include cranberry)  If you are having discomfort in back or side, decrease activity    Please call your physician or the physician on call if you experience:  Fever greater than 101 degrees  Severe pain not relieved by pain medication or rest    Please make an appointment for the removal of the stent according to your physician's instructions.        GENERAL ANESTHESIA OR SEDATION ADULT DISCHARGE INSTRUCTIONS   SPECIAL PRECAUTIONS FOR 24 HOURS AFTER SURGERY    IT IS NOT UNUSUAL TO FEEL LIGHT-HEADED OR FAINT, UP TO 24 HOURS AFTER SURGERY OR WHILE TAKING PAIN MEDICATION.  IF YOU HAVE THESE SYMPTOMS; SIT FOR A FEW MINUTES BEFORE STANDING AND HAVE SOMEONE ASSIST YOU WHEN YOU GET UP TO WALK OR USE THE BATHROOM.    YOU SHOULD REST AND RELAX FOR THE NEXT 24 HOURS AND YOU MUST MAKE ARRANGEMENTS TO HAVE SOMEONE STAY WITH YOU FOR AT LEAST 24 HOURS  AFTER YOUR DISCHARGE.  AVOID HAZARDOUS AND STRENUOUS ACTIVITIES.  DO NOT MAKE IMPORTANT DECISIONS FOR 24 HOURS.    DO NOT DRIVE ANY VEHICLE OR OPERATE MECHANICAL EQUIPMENT FOR 24 HOURS FOLLOWING THE END OF YOUR SURGERY.  EVEN THOUGH YOU MAY FEEL NORMAL, YOUR REACTIONS MAY BE AFFECTED BY THE MEDICATION YOU HAVE RECEIVED.    DO NOT DRINK ALCOHOLIC BEVERAGES FOR 24 HOURS FOLLOWING YOUR SURGERY.    DRINK CLEAR LIQUIDS (APPLE JUICE, GINGER ALE, 7-UP, BROTH, ETC.).  PROGRESS TO YOUR REGULAR DIET AS YOU FEEL ABLE.    YOU MAY HAVE A DRY MOUTH, A SORE THROAT, MUSCLES ACHES OR TROUBLE SLEEPING.  THESE SHOULD GO AWAY AFTER 24 HOURS.    CALL YOUR DOCTOR FOR ANY OF THE FOLLOWING:  SIGNS OF INFECTION (FEVER, GROWING TENDERNESS AT THE SURGERY SITE, A LARGE AMOUNT OF DRAINAGE OR BLEEDING, SEVERE PAIN, FOUL-SMELLING DRAINAGE, REDNESS OR SWELLING.    IT HAS BEEN OVER 8 TO 10 HOURS SINCE SURGERY AND YOU ARE STILL NOT ABLE TO URINATE (PASS WATER).

## 2021-10-28 NOTE — ANESTHESIA POSTPROCEDURE EVALUATION
Patient: Ashutosh Moraes    Procedure: Procedure(s):  cystoscopy, right ureteroscopy with laser lithotripsy and stone basketing, right retrograde pyelogram, right ureteral stent placement       Diagnosis:Obstruction of right ureteropelvic junction (UPJ) due to stone [N20.1]  Diagnosis Additional Information: No value filed.    Anesthesia Type:  General    Note:  Disposition: Outpatient   Postop Pain Control: Uneventful            Sign Out: Well controlled pain   PONV: No   Neuro/Psych: Uneventful            Sign Out: Acceptable/Baseline neuro status   Airway/Respiratory: Uneventful            Sign Out: Acceptable/Baseline resp. status   CV/Hemodynamics: Uneventful            Sign Out: Acceptable CV status; No obvious hypovolemia; No obvious fluid overload   Other NRE: NONE   DID A NON-ROUTINE EVENT OCCUR? No           Last vitals:  Vitals Value Taken Time   /68 10/28/21 1514   Temp 97.3  F (36.3  C) 10/28/21 1500   Pulse 83 10/28/21 1514   Resp 16 10/28/21 1514   SpO2 100 % 10/28/21 1514       Electronically Signed By: Priya Baez MD  October 28, 2021  5:34 PM

## 2021-10-28 NOTE — ANESTHESIA PREPROCEDURE EVALUATION
Anesthesia Pre-Procedure Evaluation    Patient: Ashutosh Moraes   MRN: [id number] : 1965        Preoperative Diagnosis: Obstruction of right ureteropelvic junction (UPJ) due to stone [N20.1]    Procedure : Procedure(s):  cystoscopy, right ureteroscopy with laser lithotripsy and stone basketing, right retrograde pyelogram, right ureteral stent placement          Past Medical History:   Diagnosis Date     CAD (coronary artery disease)     Cardiac cath 2009: RAMANA to [facility]     Diabetes (H)     Type 2     Gastro-oesophageal reflux disease      HLD (hyperlipidemia)      Hypertension      Kidney stones      Pancreatic disease      Sleep apnea     Doesn't use a CPAP     Stented coronary artery       Past Surgical History:   Procedure Laterality Date     APPENDECTOMY       COLONOSCOPY       COMBINED CYSTOSCOPY, RETROGRADES, URETEROSCOPY, LASER HOLMIUM LITHOTRIPSY URETER(S), INSERT STENT Right 2021    Procedure: Cystoscopy, right ureteroscopy with laser lithotripsy, right ureteroscopy with stone basketing, right retrograde pyelogram, right ureteral stent placement, fluoroscopic interpretation <1 hour physician time;  Surgeon: Hudson Montana MD;  Location: [facility]     COMBINED CYSTOSCOPY, RETROGRADES, URETEROSCOPY, LASER HOLMIUM LITHOTRIPSY URETER(S), INSERT STENT Left 2021    Procedure: Cystoscopy, left retrograde pyelogram, left ureteroscopy and left ureteral stent placement;  Surgeon: Hudson Montana MD;  Location: [facility]     COMBINED CYSTOSCOPY, RETROGRADES, URETEROSCOPY, LASER HOLMIUM LITHOTRIPSY URETER(S), INSERT STENT Left 6/3/2021    Procedure: Cystoscopy, left ureteroscopy with stone basketing, stand by laser lithotripsy, left retrograde pyelogram, left ureteral stent exchange, fluoroscopic interpretation <1 hour physician time;  Surgeon: Hudson Montana MD;  Location: [facility]     HEART CATH LEFT HEART CATH  2009    RAMANA to OM      Allergies   Allergen Reactions     Atorvastatin Rash       Social History     Tobacco Use     Smoking status: Never Smoker     Smokeless tobacco: Never Used   Substance Use Topics     Alcohol use: Yes     Alcohol/week: 0.0 standard drinks     Comment: very rarely, once a year      Wt Readings from Last 1 Encounters:   10/22/21 108.9 kg (240 lb)        Anesthesia Evaluation            ROS/MED HX  ENT/Pulmonary:     (+) sleep apnea, uses CPAP,     Neurologic:       Cardiovascular:     (+) Dyslipidemia hypertension--CAD --stent-2008. Previous cardiac testing   Echo: Date: Results:    Stress Test: Date: 2017 Results:  This was a normal stress echocardiogram with no evidence of stress-induced  ischemia.  _____________________________________________________________________________  ECG Reviewed: Date: Results:    Cath: Date: Results:   (-) WISE   METS/Exercise Tolerance:     Hematologic:       Musculoskeletal:       GI/Hepatic:     (+) GERD, Asymptomatic on medication,     Renal/Genitourinary:     (+) Nephrolithiasis ,     Endo:     (+) type II DM, Last HgA1c: 9.7, date: 9/2021, Obesity ( BMi 36),     Psychiatric/Substance Use:       Infectious Disease:       Malignancy:       Other:            Physical Exam    Airway        Mallampati: II   TM distance: > 3 FB   Neck ROM: full   Mouth opening: > 3 cm    Respiratory Devices and Support         Dental  no notable dental history         Cardiovascular   cardiovascular exam normal          Pulmonary   pulmonary exam normal                OUTSIDE LABS:  CBC:   Lab Results   Component Value Date    WBC 6.5 05/22/2021    WBC 11.3 (H) 05/21/2021    HGB 10.9 (L) 05/22/2021    HGB 11.3 (L) 05/21/2021    HCT 33.2 (L) 05/22/2021    HCT 33.6 (L) 05/21/2021     (L) 05/22/2021     (L) 05/21/2021     BMP:   Lab Results   Component Value Date     05/22/2021     (L) 05/21/2021    POTASSIUM 4.6 05/22/2021    POTASSIUM 4.5 05/21/2021    CHLORIDE 106 05/22/2021    CHLORIDE 102 05/21/2021    CO2 22 05/22/2021    CO2 21  05/21/2021    BUN 50 (H) 05/22/2021    BUN 49 (H) 05/21/2021    CR 3.56 (H) 05/22/2021    CR 4.98 (H) 05/21/2021     (H) 05/22/2021     (H) 05/21/2021     COAGS:   Lab Results   Component Value Date    PTT 42 (H) 07/02/2009    INR 1.01 07/07/2009     POC:   Lab Results   Component Value Date     (H) 06/03/2021     HEPATIC:   Lab Results   Component Value Date    ALBUMIN 4.0 07/08/2009    PROTTOTAL 7.0 07/08/2009    ALT <5 (L) 05/11/2018    AST 68 (H) 07/08/2009    ALKPHOS 76 07/08/2009    BILITOTAL 0.3 07/08/2009     OTHER:   Lab Results   Component Value Date    A1C 8.8 (H) 05/21/2021    KATIANA 7.8 (L) 05/22/2021    CRP 10.1 (H) 03/02/2014    SED 6 03/02/2014       Anesthesia Plan    ASA Status:  3   NPO Status:  NPO Appropriate    Anesthesia Type: General.     - Airway: LMA      Maintenance: Balanced.        Consents         - Extended Intubation/Ventilatory Support Discussed: Yes.      - Patient is DNR/DNI Status: No         Postoperative Care    Pain management: IV analgesics, Oral pain medications, Multi-modal analgesia.   PONV prophylaxis: Ondansetron (or other 5HT-3), Dexamethasone or Solumedrol     Comments:                Priya Baez MD

## 2021-10-29 DIAGNOSIS — Z79.2 PROPHYLACTIC ANTIBIOTIC: Primary | ICD-10-CM

## 2021-10-29 LAB
ATRIAL RATE - MUSE: 82 BPM
DIASTOLIC BLOOD PRESSURE - MUSE: NORMAL MMHG
INTERPRETATION ECG - MUSE: NORMAL
P AXIS - MUSE: 27 DEGREES
PR INTERVAL - MUSE: 148 MS
QRS DURATION - MUSE: 100 MS
QT - MUSE: 364 MS
QTC - MUSE: 425 MS
R AXIS - MUSE: -12 DEGREES
SYSTOLIC BLOOD PRESSURE - MUSE: NORMAL MMHG
T AXIS - MUSE: 26 DEGREES
VENTRICULAR RATE- MUSE: 82 BPM

## 2021-10-29 RX ORDER — CIPROFLOXACIN 500 MG/1
500 TABLET, FILM COATED ORAL ONCE
Qty: 1 TABLET | Refills: 0 | Status: SHIPPED | OUTPATIENT
Start: 2021-10-29 | End: 2021-10-29

## 2021-10-31 LAB
APPEARANCE STONE: NORMAL
COMPN STONE: NORMAL
NUMBER STONE: NORMAL
SIZE STONE: NORMAL MM
SPECIMEN WT: 170 MG

## 2021-11-08 ENCOUNTER — OFFICE VISIT (OUTPATIENT)
Dept: UROLOGY | Facility: CLINIC | Age: 56
End: 2021-11-08
Payer: COMMERCIAL

## 2021-11-08 VITALS
SYSTOLIC BLOOD PRESSURE: 140 MMHG | BODY MASS INDEX: 35.55 KG/M2 | HEIGHT: 69 IN | HEART RATE: 86 BPM | OXYGEN SATURATION: 99 % | DIASTOLIC BLOOD PRESSURE: 70 MMHG | WEIGHT: 240 LBS

## 2021-11-08 DIAGNOSIS — N20.0 NEPHROLITHIASIS: ICD-10-CM

## 2021-11-08 DIAGNOSIS — N20.1 RIGHT URETERAL STONE: Primary | ICD-10-CM

## 2021-11-08 PROCEDURE — 52310 CYSTOSCOPY AND TREATMENT: CPT | Performed by: STUDENT IN AN ORGANIZED HEALTH CARE EDUCATION/TRAINING PROGRAM

## 2021-11-08 RX ORDER — CIPROFLOXACIN 500 MG/1
TABLET, FILM COATED ORAL
COMMUNITY
Start: 2021-10-29 | End: 2023-03-27

## 2021-11-08 RX ORDER — LIDOCAINE HYDROCHLORIDE 20 MG/ML
JELLY TOPICAL ONCE
Status: DISCONTINUED | OUTPATIENT
Start: 2021-11-08 | End: 2021-11-08 | Stop reason: HOSPADM

## 2021-11-08 ASSESSMENT — PAIN SCALES - GENERAL: PAINLEVEL: NO PAIN (0)

## 2021-11-08 ASSESSMENT — MIFFLIN-ST. JEOR: SCORE: 1916.07

## 2021-11-08 NOTE — NURSING NOTE
Chief Complaint   Patient presents with     Right ureteral     Here for a in office cystoscopy to get stent removed     Prior to the start of the procedure and with procedural staff participation, I verbally confirmed the patient s identity using two indicators, relevant allergies, that the procedure was appropriate and matched the consent or emergent situation, and that the correct equipment/implants were available. Immediately prior to starting the procedure I conducted the Time Out with the procedural staff and re-confirmed the patient s name, procedure, and site/side. I have wiped the patient off with the povidone-Iodine solution, draped them,  used Lidocaine hydrochloride jelly, and instilled sterile water into the bladder. (The Joint Commission universal protocol was followed.)  Yes    Sedation (Moderate or Deep): Urojet    5mL 2% lidocaine hydrochloride Urojet instilled into urethra.    NDC# [address]-8  Lot #: [id number]  Expiration Date:  7-22      Radha Ceja

## 2021-11-08 NOTE — PATIENT INSTRUCTIONS
"AFTER YOUR CYSTOSCOPY  ?  ?  You have just completed a cystoscopy, or \"cysto\", which allowed your physician to learn more about your bladder (or to remove a stent placed after surgery). We suggest that you continue to avoid caffeine, fruit juice, and alcohol for the next 24 hours, however, you are encouraged to return to your normal activities.  ?  ?  A few things that are considered normal after your cystoscopy:  ?  * small amount of bleeding (or spotting) that clears within the next 24 hours  ?  * slight burning sensation with urination  ?  * sensation of needing to void (urinate) more frequently  ?  * the feeling of \"air\" in your urine  ?  * mild discomfort that is relieved with Tylenol    * bladder spasms  ?  ?  ?  Please contact our office promptly if you:  ?  * develop a fever above 101 degrees  ?  * are unable to urinate  ?  * develop bright red blood that does not stop  ?  * experience severe pain or swelling  ?  ?  ?  And of course, please contact our office with any concerns or questions [phone]  ?        Patient Education     Preventing Kidney Stones  If you ve had a kidney stone, you may worry that you ll have another. Removing or passing your stone doesn t prevent future stones. But with your healthcare provider s help, you can reduce your risk of forming new stones. Follow up with your healthcare provider to help find new stones. Depending on your medical condition, you may need follow-up every 3 months to a year for the rest of your life.     Drink lots of water  Staying well-hydrated is the best way to reduce your risk of future stones. Drink 8 12-ounce glasses of water daily. Have 2 with each meal and 2 between meals. Keep track of your intake. Try keeping a pitcher of water nearby during the day and at night.   Take medicines if needed  Medicines, including vitamins and minerals, may be prescribed for certain types of stones. You may want to write your doses and medicine times on a calendar. " Some medicines decrease stone-forming chemicals in your blood. Others help prevent those chemicals from crystallizing in urine. Still others help keep a normal acid balance in your urine.   Follow your prescribed diet  Your healthcare provider will tell you which foods contain the chemicals you should avoid. Your healthcare provider may also suggest talking to a dietitian. He or she can help you plan meals you ll enjoy. These meals won t put you at risk for future stones. Bring your spouse, partner, or close friend with you when you meet with the dietitian so you can have support for your necessary diet changes.   You may be told to limit certain foods, depending on which type of stones you ve had. You should limit the amount of salt in your food to about 2 grams a day. This will help prevent most types of kidney stones. Make sure you get an adequate amount of calcium in your diet.   For calcium oxalate stones: Limit animal protein, such as meat, eggs, and fish. Limit grapefruit juice and alcohol. Limit high-oxalate foods (such as cola, tea, chocolate, spinach, rhubarb, wheat bran, and peanuts).   For uric acid stones: Limit high-purine foods, such as mushrooms, peas, beans, anchovies, meat, poultry, shellfish, and organ meats. These foods increase uric acid production.   For cystine stones: Limit high-methionine foods (fish is the most common, but eggs and meats, also). These foods increase production of cystine.   [employer] last reviewed this educational content on 4/1/2020 2000-2021 [employer]. All rights reserved. This information is not intended as a substitute for professional medical care. Always follow your healthcare professional's instructions.

## 2021-11-08 NOTE — LETTER
11/8/2021       RE: Ashutosh Moraes  [id number]  [place] [facility] [address]     Dear Colleague,    Thank you for referring your patient, Ashutosh Moraes, to the [facility] at [facility]. Please see a copy of my visit note below.    PRE-PROCEDURE DIAGNOSIS: right nephrolithiasis    POST-PROCEDURE DIAGNOSIS: right nephrolithiasis    PROCEDURE: Cystoscopy with right ureteral stent removal    HISTORY:56 yo M with long history of nephrolithiasis with multiple stone procedures in the past, now most recently s/p right URS 10/28/2021, here for stent removal    REVIEW OF OFFICE STUDIES (e.g., uroflow or PVR): Calculi composed primarily of:   10% calcium oxalate (monohydrate and dihydrate), and   90% uric acid.     DESCRIPTION OF PROCEDURE: After informed consent was obtained, the patient was brought to the procedure room where he was placed in the supine position with all pressure points well padded.  The penis was prepped and draped in sterile fashion. A flexible cystoscope was introduced through a well-lubricated urethra. The stent was visualized, grasped with a flexible grasper and removed intact and discarded    The flexible cystoscope was removed and the findings were described to the patient.     ASSESSMENT AND PLAN: 56 yo M with long history of nephrolithiasis (both caox and uric acid) with multiple stone procedures in the past, now most recently s/p right URS 10/28/2021, here for stent removal. Most recent stones were uric acid. He is planning on close followup with nephrology. Will defer to nephrology regarding further 24 hour urine collections and medical stone prevention.    - follow up with nephrology as scheduled later this month  - follow up in 6 months with CT abd/pelvis w/o contrast prior    Hudson Montana MD   [facility]  [phone] clinic phone

## 2021-11-08 NOTE — PROGRESS NOTES
PRE-PROCEDURE DIAGNOSIS: right nephrolithiasis    POST-PROCEDURE DIAGNOSIS: right nephrolithiasis    PROCEDURE: Cystoscopy with right ureteral stent removal    HISTORY:56 yo M with long history of nephrolithiasis with multiple stone procedures in the past, now most recently s/p right URS 10/28/2021, here for stent removal    REVIEW OF OFFICE STUDIES (e.g., uroflow or PVR): Calculi composed primarily of:   10% calcium oxalate (monohydrate and dihydrate), and   90% uric acid.     DESCRIPTION OF PROCEDURE: After informed consent was obtained, the patient was brought to the procedure room where he was placed in the supine position with all pressure points well padded.  The penis was prepped and draped in sterile fashion. A flexible cystoscope was introduced through a well-lubricated urethra. The stent was visualized, grasped with a flexible grasper and removed intact and discarded    The flexible cystoscope was removed and the findings were described to the patient.     ASSESSMENT AND PLAN: 56 yo M with long history of nephrolithiasis (both caox and uric acid) with multiple stone procedures in the past, now most recently s/p right URS 10/28/2021, here for stent removal. Most recent stones were uric acid. He is planning on close followup with nephrology. Will defer to nephrology regarding further 24 hour urine collections and medical stone prevention.    - follow up with nephrology as scheduled later this month  - follow up in 6 months with CT abd/pelvis w/o contrast prior    Hudson Montana MD   [facility]  [phone] clinic phone

## 2021-12-18 ENCOUNTER — HEALTH MAINTENANCE LETTER (OUTPATIENT)
Age: 56
End: 2021-12-18

## 2022-02-12 ENCOUNTER — HEALTH MAINTENANCE LETTER (OUTPATIENT)
Age: 57
End: 2022-02-12

## 2022-04-09 ENCOUNTER — HEALTH MAINTENANCE LETTER (OUTPATIENT)
Age: 57
End: 2022-04-09

## 2022-06-05 ENCOUNTER — APPOINTMENT (OUTPATIENT)
Dept: CT IMAGING | Facility: CLINIC | Age: 57
End: 2022-06-05
Attending: EMERGENCY MEDICINE
Payer: COMMERCIAL

## 2022-06-05 ENCOUNTER — HOSPITAL ENCOUNTER (EMERGENCY)
Facility: CLINIC | Age: 57
Discharge: HOME | End: 2022-06-05
Attending: EMERGENCY MEDICINE | Admitting: EMERGENCY MEDICINE
Payer: COMMERCIAL

## 2022-06-05 VITALS
SYSTOLIC BLOOD PRESSURE: 175 MMHG | RESPIRATION RATE: 20 BRPM | TEMPERATURE: 98.4 F | DIASTOLIC BLOOD PRESSURE: 106 MMHG | HEART RATE: 85 BPM | OXYGEN SATURATION: 96 %

## 2022-06-05 DIAGNOSIS — N20.0 KIDNEY STONE: ICD-10-CM

## 2022-06-05 LAB
ALBUMIN UR-MCNC: 50 MG/DL
ANION GAP SERPL CALCULATED.3IONS-SCNC: 5 MMOL/L (ref 3–14)
APPEARANCE UR: CLEAR
BASOPHILS # BLD AUTO: 0.1 10E3/UL (ref 0–0.2)
BASOPHILS NFR BLD AUTO: 1 %
BILIRUB UR QL STRIP: NEGATIVE
BUN SERPL-MCNC: 21 MG/DL (ref 7–30)
CALCIUM SERPL-MCNC: 8.8 MG/DL (ref 8.5–10.1)
CHLORIDE BLD-SCNC: 106 MMOL/L (ref 94–109)
CO2 SERPL-SCNC: 27 MMOL/L (ref 20–32)
COLOR UR AUTO: ABNORMAL
CREAT SERPL-MCNC: 1.9 MG/DL (ref 0.66–1.25)
EOSINOPHIL # BLD AUTO: 0.5 10E3/UL (ref 0–0.7)
EOSINOPHIL NFR BLD AUTO: 5 %
ERYTHROCYTE [DISTWIDTH] IN BLOOD BY AUTOMATED COUNT: 13 % (ref 10–15)
GFR SERPL CREATININE-BSD FRML MDRD: 41 ML/MIN/1.73M2
GLUCOSE BLD-MCNC: 196 MG/DL (ref 70–99)
GLUCOSE UR STRIP-MCNC: 70 MG/DL
HCT VFR BLD AUTO: 42.5 % (ref 40–53)
HGB BLD-MCNC: 14.2 G/DL (ref 13.3–17.7)
HGB UR QL STRIP: ABNORMAL
IMM GRANULOCYTES # BLD: 0 10E3/UL
IMM GRANULOCYTES NFR BLD: 0 %
KETONES UR STRIP-MCNC: NEGATIVE MG/DL
LEUKOCYTE ESTERASE UR QL STRIP: NEGATIVE
LYMPHOCYTES # BLD AUTO: 2.5 10E3/UL (ref 0.8–5.3)
LYMPHOCYTES NFR BLD AUTO: 25 %
MCH RBC QN AUTO: 28.2 PG (ref 26.5–33)
MCHC RBC AUTO-ENTMCNC: 33.4 G/DL (ref 31.5–36.5)
MCV RBC AUTO: 84 FL (ref 78–100)
MONOCYTES # BLD AUTO: 0.8 10E3/UL (ref 0–1.3)
MONOCYTES NFR BLD AUTO: 8 %
NEUTROPHILS # BLD AUTO: 6.1 10E3/UL (ref 1.6–8.3)
NEUTROPHILS NFR BLD AUTO: 61 %
NITRATE UR QL: NEGATIVE
NRBC # BLD AUTO: 0 10E3/UL
NRBC BLD AUTO-RTO: 0 /100
PH UR STRIP: 6.5 [PH] (ref 5–7)
PLATELET # BLD AUTO: 194 10E3/UL (ref 150–450)
POTASSIUM BLD-SCNC: 3.5 MMOL/L (ref 3.4–5.3)
RBC # BLD AUTO: 5.04 10E6/UL (ref 4.4–5.9)
RBC URINE: 4 /HPF
SODIUM SERPL-SCNC: 138 MMOL/L (ref 133–144)
SP GR UR STRIP: 1.02 (ref 1–1.03)
UROBILINOGEN UR STRIP-MCNC: NORMAL MG/DL
WBC # BLD AUTO: 10 10E3/UL (ref 4–11)
WBC URINE: 1 /HPF

## 2022-06-05 PROCEDURE — 74176 CT ABD & PELVIS W/O CONTRAST: CPT

## 2022-06-05 PROCEDURE — 82310 ASSAY OF CALCIUM: CPT | Performed by: EMERGENCY MEDICINE

## 2022-06-05 PROCEDURE — 250N000011 HC RX IP 250 OP 636: Performed by: EMERGENCY MEDICINE

## 2022-06-05 PROCEDURE — 36415 COLL VENOUS BLD VENIPUNCTURE: CPT | Performed by: EMERGENCY MEDICINE

## 2022-06-05 PROCEDURE — 85025 COMPLETE CBC W/AUTO DIFF WBC: CPT | Performed by: EMERGENCY MEDICINE

## 2022-06-05 PROCEDURE — 81001 URINALYSIS AUTO W/SCOPE: CPT | Performed by: EMERGENCY MEDICINE

## 2022-06-05 PROCEDURE — 96361 HYDRATE IV INFUSION ADD-ON: CPT

## 2022-06-05 PROCEDURE — 99285 EMERGENCY DEPT VISIT HI MDM: CPT | Mod: 25

## 2022-06-05 PROCEDURE — 96375 TX/PRO/DX INJ NEW DRUG ADDON: CPT

## 2022-06-05 PROCEDURE — 96374 THER/PROPH/DIAG INJ IV PUSH: CPT

## 2022-06-05 PROCEDURE — 258N000003 HC RX IP 258 OP 636: Performed by: EMERGENCY MEDICINE

## 2022-06-05 RX ORDER — ONDANSETRON 2 MG/ML
4 INJECTION INTRAMUSCULAR; INTRAVENOUS EVERY 30 MIN PRN
Status: DISCONTINUED | OUTPATIENT
Start: 2022-06-05 | End: 2022-06-05 | Stop reason: HOSPADM

## 2022-06-05 RX ORDER — HYDROMORPHONE HYDROCHLORIDE 1 MG/ML
0.5 INJECTION, SOLUTION INTRAMUSCULAR; INTRAVENOUS; SUBCUTANEOUS
Status: DISCONTINUED | OUTPATIENT
Start: 2022-06-05 | End: 2022-06-05 | Stop reason: HOSPADM

## 2022-06-05 RX ADMIN — HYDROMORPHONE HYDROCHLORIDE 0.5 MG: 1 INJECTION, SOLUTION INTRAMUSCULAR; INTRAVENOUS; SUBCUTANEOUS at 03:22

## 2022-06-05 RX ADMIN — ONDANSETRON 4 MG: 2 INJECTION INTRAMUSCULAR; INTRAVENOUS at 03:21

## 2022-06-05 RX ADMIN — SODIUM CHLORIDE 1000 ML: 9 INJECTION, SOLUTION INTRAVENOUS at 03:22

## 2022-06-05 ASSESSMENT — ENCOUNTER SYMPTOMS
CHILLS: 1
FLANK PAIN: 1
HEMATURIA: 0
VOMITING: 1

## 2022-06-05 NOTE — ED PROVIDER NOTES
History   Chief Complaint:  Flank Pain       The history is provided by the patient.      Ashutosh Moraes is a 56 year old male with history of kidney stones, acute kidney injury, hypertension, hyperlipidemia, and Diabetes Mellitus Type 2 who presents with right flank pain. Symptoms began tonight with associated chills and vomiting. No hematuria. He reports multiple previous episodes of kidney stones which required surgical intervention. He has not taken any pain medication for his symptoms. He still has both of his kidneys, but he no longer has his appendix.     Review of Systems   Constitutional: Positive for chills.   Gastrointestinal: Positive for vomiting.   Genitourinary: Positive for flank pain. Negative for hematuria.   All other systems reviewed and are negative.    Allergies:  Atorvastatin    Medications:  amlodipine  aspirin  lisinopril-hydrochlorothiazide  metformin  omeprazole   ondansetron   rosuvastatin   tamsulosin  trulicity      Past Medical History:     Coronary artery disease   Diabetes Mellitus Type 2   Gastro-oesophageal reflux disease   Hyperlipidemia   Hypertension   Kidney stones   Pancreatic disease   Hypercholesterolemia  Sleep apnea  Morbid obesity   Ureteral stone  Acute kidney injury     Past Surgical History:    Stented coronary artery  Appendectomy   Ureter stent, right x2  Ureter stent, left x2     Family History:    Mother: asthma   Father: coronary artery disease     Social History:  Presents alone.   PCP: Kathy Chandra      Physical Exam     Patient Vitals for the past 24 hrs:   BP Temp Temp src Pulse Resp SpO2   06/05/22 0410 (!) 175/106 -- -- 85 -- 96 %   06/05/22 0257 (!) 185/108 98.4  F (36.9  C) Oral 86 20 99 %       Physical Exam  General: pacing around the room. Appears uncomfortable   Head: The scalp, face, and head appear normal  Eyes: The pupils are equal, round, and reactive to light. Conjunctivae and sclerae are normal  Neck: Normal range of motion.   CV: Regular rate  and rhythm.   Resp: Lungs are clear without wheezes or rales. No respiratory distress.   GI: Abdomen is soft, no rigidity, guarding, or rebound. No distension. No tenderness to palpation in any quadrant. No CVA tenderness     MS: Normal tone. Joints grossly normal without effusions. No asymmetric leg swelling, calf or thigh tenderness.    Skin: No rash or lesions noted. Normal capillary refill noted  Neuro: Speech is normal and fluent. Face is symmetric. Moving all extremities.   Psych:  Normal affect.  Appropriate interactions.    Emergency Department Course     Imaging:  CT Abdomen Pelvis w/o Contrast   Final Result   IMPRESSION:    1.  There is mild right hydronephrosis and a 3 mm stone present within the bladder. Findings likely reflect recently passed stone from the right given the patient's clinical symptoms. Clinical correlation. No ureteral stones.      2.  Bilateral nonobstructing intrarenal calculi, right greater than left as detailed above.      3.  Small hiatal hernia. No acute bowel findings.           Report per radiology    Laboratory:  Labs Ordered and Resulted from Time of ED Arrival to Time of ED Departure   BASIC METABOLIC PANEL - Abnormal       Result Value    Sodium 138      Potassium 3.5      Chloride 106      Carbon Dioxide (CO2) 27      Anion Gap 5      Urea Nitrogen 21      Creatinine 1.90 (*)     Calcium 8.8      Glucose 196 (*)     GFR Estimate 41 (*)    ROUTINE UA WITH MICROSCOPIC REFLEX TO CULTURE - Abnormal    Color Urine Light Yellow      Appearance Urine Clear      Glucose Urine 70  (*)     Bilirubin Urine Negative      Ketones Urine Negative      Specific Gravity Urine 1.016      Blood Urine Small (*)     pH Urine 6.5      Protein Albumin Urine 50  (*)     Urobilinogen Urine Normal      Nitrite Urine Negative      Leukocyte Esterase Urine Negative      RBC Urine 4 (*)     WBC Urine 1     CBC WITH PLATELETS AND DIFFERENTIAL    WBC Count 10.0      RBC Count 5.04      Hemoglobin 14.2       Hematocrit 42.5      MCV 84      MCH 28.2      MCHC 33.4      RDW 13.0      Platelet Count 194      % Neutrophils 61      % Lymphocytes 25      % Monocytes 8      % Eosinophils 5      % Basophils 1      % Immature Granulocytes 0      NRBCs per 100 WBC 0      Absolute Neutrophils 6.1      Absolute Lymphocytes 2.5      Absolute Monocytes 0.8      Absolute Eosinophils 0.5      Absolute Basophils 0.1      Absolute Immature Granulocytes 0.0      Absolute NRBCs 0.0          Emergency Department Course:    Reviewed:  I reviewed nursing notes, vitals, past medical history and Care Everywhere    Assessments:  0305 I obtained history and examined the patient as noted above.   0400 I rechecked the patient and explained findings.     Interventions:  0321 Zofran 4 mg IV   0322 Dilaudid 0.5mg IV   0322 NS, 1 L, IV    Disposition:  The patient was discharged to home.     Impression & Plan     Medical Decision Making:  Patient is a 56-year-old gentleman with past medical history of kidney stones who presents to the emergency department with right flank pain very reminiscent of kidney stones that he has had in the past.  CT scan shows a kidney stone within the urinary bladder with right-sided hydronephrosis consistent with a passed kidney stone.  Patient was able to pass the kidney stone while in the department.  Pain subsequently resolved.  Kidney function is at baseline.  Urine is negative for signs of infection.  Patient can follow-up with urology as needed and return the emergency department with any new or worsening symptoms.    Diagnosis:    ICD-10-CM    1. Kidney stone, passed  N20.0        Scribe Disclosure:  Berna MARIN, am serving as a scribe at 3:03 AM on 6/5/2022 to document services personally performed by Masoud Macias MD based on my observations and the provider's statements to me.        Masoud Macias MD  06/05/22 0382

## 2022-06-05 NOTE — ED TRIAGE NOTES
Pt states right flank pain for one hour pta. Pt states pain is similar to previous kidney stones. ABCs intact GCS 15     Triage Assessment     Row Name 06/05/22 0257       Triage Assessment (Adult)    Airway WDL WDL       Respiratory WDL    Respiratory WDL WDL       Skin Circulation/Temperature WDL    Skin Circulation/Temperature WDL WDL       Cardiac WDL    Cardiac WDL WDL       Peripheral/Neurovascular WDL    Peripheral Neurovascular WDL WDL       Cognitive/Neuro/Behavioral WDL    Cognitive/Neuro/Behavioral WDL WDL

## 2022-07-30 ENCOUNTER — HEALTH MAINTENANCE LETTER (OUTPATIENT)
Age: 57
End: 2022-07-30

## 2022-10-09 ENCOUNTER — HEALTH MAINTENANCE LETTER (OUTPATIENT)
Age: 57
End: 2022-10-09

## 2022-11-26 ENCOUNTER — HEALTH MAINTENANCE LETTER (OUTPATIENT)
Age: 57
End: 2022-11-26

## 2022-12-16 ENCOUNTER — APPOINTMENT (OUTPATIENT)
Dept: CT IMAGING | Facility: CLINIC | Age: 57
End: 2022-12-16
Attending: EMERGENCY MEDICINE
Payer: COMMERCIAL

## 2022-12-16 ENCOUNTER — HOSPITAL ENCOUNTER (EMERGENCY)
Facility: CLINIC | Age: 57
Discharge: HOME | End: 2022-12-16
Attending: STUDENT IN AN ORGANIZED HEALTH CARE EDUCATION/TRAINING PROGRAM | Admitting: STUDENT IN AN ORGANIZED HEALTH CARE EDUCATION/TRAINING PROGRAM
Payer: COMMERCIAL

## 2022-12-16 VITALS
RESPIRATION RATE: 16 BRPM | TEMPERATURE: 97.8 F | OXYGEN SATURATION: 98 % | SYSTOLIC BLOOD PRESSURE: 164 MMHG | DIASTOLIC BLOOD PRESSURE: 102 MMHG | HEART RATE: 67 BPM

## 2022-12-16 DIAGNOSIS — N20.1 URETERAL STONE: ICD-10-CM

## 2022-12-16 LAB
ALBUMIN UR-MCNC: 70 MG/DL
ANION GAP SERPL CALCULATED.3IONS-SCNC: 13 MMOL/L (ref 7–15)
APPEARANCE UR: CLEAR
BASOPHILS # BLD AUTO: 0 10E3/UL (ref 0–0.2)
BASOPHILS NFR BLD AUTO: 0 %
BILIRUB UR QL STRIP: NEGATIVE
BUN SERPL-MCNC: 23.4 MG/DL (ref 6–20)
CALCIUM SERPL-MCNC: 9.1 MG/DL (ref 8.6–10)
CHLORIDE SERPL-SCNC: 100 MMOL/L (ref 98–107)
COLOR UR AUTO: ABNORMAL
CREAT SERPL-MCNC: 1.79 MG/DL (ref 0.67–1.17)
DEPRECATED HCO3 PLAS-SCNC: 24 MMOL/L (ref 22–29)
EOSINOPHIL # BLD AUTO: 0 10E3/UL (ref 0–0.7)
EOSINOPHIL NFR BLD AUTO: 0 %
ERYTHROCYTE [DISTWIDTH] IN BLOOD BY AUTOMATED COUNT: 12.8 % (ref 10–15)
GFR SERPL CREATININE-BSD FRML MDRD: 44 ML/MIN/1.73M2
GLUCOSE SERPL-MCNC: 142 MG/DL (ref 70–99)
GLUCOSE UR STRIP-MCNC: NEGATIVE MG/DL
HCT VFR BLD AUTO: 43.8 % (ref 40–53)
HGB BLD-MCNC: 14.6 G/DL (ref 13.3–17.7)
HGB UR QL STRIP: ABNORMAL
HOLD SPECIMEN: NORMAL
HOLD SPECIMEN: NORMAL
HYALINE CASTS: 1 /LPF
IMM GRANULOCYTES # BLD: 0 10E3/UL
IMM GRANULOCYTES NFR BLD: 0 %
KETONES UR STRIP-MCNC: NEGATIVE MG/DL
LEUKOCYTE ESTERASE UR QL STRIP: NEGATIVE
LYMPHOCYTES # BLD AUTO: 1.2 10E3/UL (ref 0.8–5.3)
LYMPHOCYTES NFR BLD AUTO: 9 %
MCH RBC QN AUTO: 28.4 PG (ref 26.5–33)
MCHC RBC AUTO-ENTMCNC: 33.3 G/DL (ref 31.5–36.5)
MCV RBC AUTO: 85 FL (ref 78–100)
MONOCYTES # BLD AUTO: 0.3 10E3/UL (ref 0–1.3)
MONOCYTES NFR BLD AUTO: 3 %
MUCOUS THREADS #/AREA URNS LPF: PRESENT /LPF
NEUTROPHILS # BLD AUTO: 11.3 10E3/UL (ref 1.6–8.3)
NEUTROPHILS NFR BLD AUTO: 88 %
NITRATE UR QL: NEGATIVE
NRBC # BLD AUTO: 0 10E3/UL
NRBC BLD AUTO-RTO: 0 /100
PH UR STRIP: 5.5 [PH] (ref 5–7)
PLATELET # BLD AUTO: 210 10E3/UL (ref 150–450)
POTASSIUM SERPL-SCNC: 5.1 MMOL/L (ref 3.4–5.3)
RBC # BLD AUTO: 5.14 10E6/UL (ref 4.4–5.9)
RBC URINE: 42 /HPF
SODIUM SERPL-SCNC: 137 MMOL/L (ref 136–145)
SP GR UR STRIP: 1.02 (ref 1–1.03)
SQUAMOUS EPITHELIAL: <1 /HPF
UROBILINOGEN UR STRIP-MCNC: NORMAL MG/DL
WBC # BLD AUTO: 12.9 10E3/UL (ref 4–11)
WBC URINE: 2 /HPF

## 2022-12-16 PROCEDURE — 96361 HYDRATE IV INFUSION ADD-ON: CPT

## 2022-12-16 PROCEDURE — 36415 COLL VENOUS BLD VENIPUNCTURE: CPT | Performed by: EMERGENCY MEDICINE

## 2022-12-16 PROCEDURE — 258N000003 HC RX IP 258 OP 636: Performed by: EMERGENCY MEDICINE

## 2022-12-16 PROCEDURE — 99285 EMERGENCY DEPT VISIT HI MDM: CPT | Mod: 25

## 2022-12-16 PROCEDURE — 85014 HEMATOCRIT: CPT | Performed by: EMERGENCY MEDICINE

## 2022-12-16 PROCEDURE — 82310 ASSAY OF CALCIUM: CPT | Performed by: EMERGENCY MEDICINE

## 2022-12-16 PROCEDURE — 250N000011 HC RX IP 250 OP 636: Performed by: EMERGENCY MEDICINE

## 2022-12-16 PROCEDURE — 96375 TX/PRO/DX INJ NEW DRUG ADDON: CPT

## 2022-12-16 PROCEDURE — 74176 CT ABD & PELVIS W/O CONTRAST: CPT

## 2022-12-16 PROCEDURE — 81001 URINALYSIS AUTO W/SCOPE: CPT | Performed by: EMERGENCY MEDICINE

## 2022-12-16 PROCEDURE — 96374 THER/PROPH/DIAG INJ IV PUSH: CPT

## 2022-12-16 PROCEDURE — 82374 ASSAY BLOOD CARBON DIOXIDE: CPT | Performed by: EMERGENCY MEDICINE

## 2022-12-16 RX ORDER — ONDANSETRON 2 MG/ML
4 INJECTION INTRAMUSCULAR; INTRAVENOUS EVERY 30 MIN PRN
Status: DISCONTINUED | OUTPATIENT
Start: 2022-12-16 | End: 2022-12-16 | Stop reason: HOSPADM

## 2022-12-16 RX ORDER — KETOROLAC TROMETHAMINE 15 MG/ML
15 INJECTION, SOLUTION INTRAMUSCULAR; INTRAVENOUS ONCE
Status: COMPLETED | OUTPATIENT
Start: 2022-12-16 | End: 2022-12-16

## 2022-12-16 RX ORDER — OXYCODONE HYDROCHLORIDE 5 MG/1
5 TABLET ORAL EVERY 6 HOURS PRN
Qty: 10 TABLET | Refills: 0 | Status: SHIPPED | OUTPATIENT
Start: 2022-12-16 | End: 2022-12-19

## 2022-12-16 RX ORDER — ONDANSETRON 4 MG/1
4 TABLET, ORALLY DISINTEGRATING ORAL EVERY 8 HOURS PRN
Qty: 10 TABLET | Refills: 0 | Status: SHIPPED | OUTPATIENT
Start: 2022-12-16 | End: 2022-12-19

## 2022-12-16 RX ORDER — TAMSULOSIN HYDROCHLORIDE 0.4 MG/1
0.4 CAPSULE ORAL DAILY
Qty: 7 CAPSULE | Refills: 0 | Status: SHIPPED | OUTPATIENT
Start: 2022-12-16 | End: 2022-12-23

## 2022-12-16 RX ADMIN — ONDANSETRON 4 MG: 2 INJECTION INTRAMUSCULAR; INTRAVENOUS at 16:12

## 2022-12-16 RX ADMIN — KETOROLAC TROMETHAMINE 15 MG: 15 INJECTION, SOLUTION INTRAMUSCULAR; INTRAVENOUS at 16:12

## 2022-12-16 RX ADMIN — SODIUM CHLORIDE 1000 ML: 9 INJECTION, SOLUTION INTRAVENOUS at 16:12

## 2022-12-16 ASSESSMENT — ENCOUNTER SYMPTOMS
FEVER: 0
HEMATURIA: 0
VOMITING: 1
FLANK PAIN: 1

## 2022-12-16 ASSESSMENT — ACTIVITIES OF DAILY LIVING (ADL)
ADLS_ACUITY_SCORE: 35
ADLS_ACUITY_SCORE: 35

## 2022-12-16 NOTE — ED PROVIDER NOTES
History     Chief Complaint:  Flank pain    HPI   Ashutosh Moraes is a 57 year old male with history of recurrent kidney stones, type 2 diabetes, and chronic kidney disease, who presents for evaluation of flank pain. Patient reports onset of symptoms around 0730 this morning with right-sided flank pain and nausea. He denies fevers. He did have 2 episodes of vomiting after arrival in the ED around 1130 this morning. He reports history of needing a ureteral stent for a kidney stone of 8 mm. His last kidney stone was 6 months ago. He took tylenol, no ibuprofen due to kidney disease.      Review of Systems   Constitutional: Negative for fever.   Gastrointestinal: Positive for vomiting.   Genitourinary: Positive for flank pain. Negative for hematuria.     All other systems reviewed and negative    Allergies:  Atorvastatin     Medications:    ondansetron (ZOFRAN ODT) 4 MG ODT tab  oxyCODONE (ROXICODONE) 5 MG tablet  tamsulosin (FLOMAX) 0.4 MG capsule  acetaminophen (TYLENOL) 500 MG tablet  amLODIPine (NORVASC) 5 MG tablet  aspirin 81 MG EC tablet  ciprofloxacin (CIPRO) 500 MG tablet  docusate sodium (COLACE) 100 MG capsule  lisinopril-hydrochlorothiazide (ZESTORETIC) 20-12.5 MG tablet  metFORMIN (GLUCOPHAGE-XR) 500 MG 24 hr tablet  omeprazole (PRILOSEC) 20 MG DR capsule  rosuvastatin (CRESTOR) 40 MG tablet  TRULICITY 1.5 MG/0.5ML pen        Past Medical History:    Past Medical History:   Diagnosis Date     CAD (coronary artery disease)      Diabetes (H)      Gastro-oesophageal reflux disease      HLD (hyperlipidemia)      Hypertension      Kidney stones      Pancreatic disease      Sleep apnea      Stented coronary artery 2009       Past Surgical History:    Past Surgical History:   Procedure Laterality Date     APPENDECTOMY       COLONOSCOPY  2020     COMBINED CYSTOSCOPY, RETROGRADES, URETEROSCOPY, LASER HOLMIUM LITHOTRIPSY URETER(S), INSERT STENT Right 1/21/2021    Procedure: Cystoscopy, right ureteroscopy with laser  lithotripsy, right ureteroscopy with stone basketing, right retrograde pyelogram, right ureteral stent placement, fluoroscopic interpretation <1 hour physician time;  Surgeon: Hudson Montana MD;  Location: [facility]     COMBINED CYSTOSCOPY, RETROGRADES, URETEROSCOPY, LASER HOLMIUM LITHOTRIPSY URETER(S), INSERT STENT Left 5/21/2021    Procedure: Cystoscopy, left retrograde pyelogram, left ureteroscopy and left ureteral stent placement;  Surgeon: Hudson Montana MD;  Location: [facility]     COMBINED CYSTOSCOPY, RETROGRADES, URETEROSCOPY, LASER HOLMIUM LITHOTRIPSY URETER(S), INSERT STENT Left 6/3/2021    Procedure: Cystoscopy, left ureteroscopy with stone basketing, stand by laser lithotripsy, left retrograde pyelogram, left ureteral stent exchange, fluoroscopic interpretation <1 hour physician time;  Surgeon: Hudson Montana MD;  Location: [facility]     COMBINED CYSTOSCOPY, RETROGRADES, URETEROSCOPY, LASER HOLMIUM LITHOTRIPSY URETER(S), INSERT STENT Right 10/28/2021    Procedure: Cystoscopy, right ureteroscopy with laser lithotripsy, right ureteroscopy with stone basketing, right retrograde pyelogram, right ureteral stent placement, fluoroscopic interpretation < 1 hour physician time;  Surgeon: Hudson Motnana MD;  Location: [facility] OR     HEART CATH LEFT HEART CATH  07/02/2009    RAMANA to OM        Family History:    family history includes Asthma in his mother; Coronary Artery Disease in his father.    Social History:  Presents with his wife.  Denies smoking or drug use.  Occasional alcohol.    Physical Exam     Patient Vitals for the past 24 hrs:   BP Temp Temp src Pulse Resp SpO2   12/16/22 1233 (!) 164/102 97.8  F (36.6  C) Oral 67 16 98 %        Physical Exam  Vitals: Reviewed, as above.   General: Alert and oriented, in mild distress. Resting comfortably on bed.  Skin: Warm and well-perfused. No rashes, lesions, or erythema.   HEENT:   Head: Normocephalic, atraumatic. Facial features symmetric.   Eyes: Conjunctiva pink,  sclera white. EOMs grossly intact.   Ears: Auricles without lesion, erythema, or edema.   Nose: Symmetric with no discharge.  Mouth and throat: Lips are moist with no chapping, lesions, or edema, Buccal mucosa is pink and moist without lesions. Oropharyngeal mucosa is pink and moist with no erythema, edema, or exudate. Uvula is midline.  Neck: Supple with no lymphadenopathy. Full ROM.   Pulmonary: Chest wall expansion symmetric with no increased work of breathing. Lungs clear to auscultation bilaterally.   Cardiovascular: Heart RRR with no murmurs. 2+ radial and tibialis posterior pulses bilaterally. 1+ pitting edema to bilateral lower extremities.  Abdominal: No hernias or distension. No CVA tenderness. Bowel sounds present and physiologic. Abdomen is soft and nontender to light and deep palpation in all 4 quadrants with no guarding or rebound. No masses or organomegaly.   Musculoskeletal: Moves all extremities spontaneously.  Psych: Affect appropriate.  Answers questions appropriately. Patient appears calm.      Emergency Department Course     Results for orders placed or performed during the hospital encounter of 12/16/22 (from the past 24 hour(s))   UA with Microscopic reflex to Culture    Specimen: Urine, Midstream   Result Value Ref Range    Color Urine Light Yellow Colorless, Straw, Light Yellow, Yellow    Appearance Urine Clear Clear    Glucose Urine Negative Negative mg/dL    Bilirubin Urine Negative Negative    Ketones Urine Negative Negative mg/dL    Specific Gravity Urine 1.025 1.003 - 1.035    Blood Urine Moderate (A) Negative    pH Urine 5.5 5.0 - 7.0    Protein Albumin Urine 70 (A) Negative mg/dL    Urobilinogen Urine Normal Normal, 2.0 mg/dL    Nitrite Urine Negative Negative    Leukocyte Esterase Urine Negative Negative    Mucus Urine Present (A) None Seen /LPF    RBC Urine 42 (H) <=2 /HPF    WBC Urine 2 <=5 /HPF    Squamous Epithelials Urine <1 <=1 /HPF    Hyaline Casts Urine 1 <=2 /LPF     Narrative    Urine Culture not indicated   CBC with platelets differential    Narrative    The following orders were created for panel order CBC with platelets differential.  Procedure                               Abnormality         Status                     ---------                               -----------         ------                     CBC with platelets and d...[[id number]]  Abnormal            Final result                 Please view results for these tests on the individual orders.   Basic metabolic panel   Result Value Ref Range    Sodium 137 136 - 145 mmol/L    Potassium 5.1 3.4 - 5.3 mmol/L    Chloride 100 98 - 107 mmol/L    Carbon Dioxide (CO2) 24 22 - 29 mmol/L    Anion Gap 13 7 - 15 mmol/L    Urea Nitrogen 23.4 (H) 6.0 - 20.0 mg/dL    Creatinine 1.79 (H) 0.67 - 1.17 mg/dL    Calcium 9.1 8.6 - 10.0 mg/dL    Glucose 142 (H) 70 - 99 mg/dL    GFR Estimate 44 (L) >60 mL/min/1.73m2   [place] Draw    Narrative    The following orders were created for panel order [facility] [place] [facility].  Procedure                               Abnormality         Status                     ---------                               -----------         ------                     Extra Red Top Tube[[id number]]                               Final result                 Please view results for these tests on the individual orders.   [facility] Draw    Narrative    The following orders were created for panel order [facility] Draw.  Procedure                               Abnormality         Status                     ---------                               -----------         ------                     Extra Blue Top Tube[[id number]]                              Final result               Extra Red Top Tube[[id number]]                                                            Please view results for these tests on the individual orders.   CBC with platelets and differential   Result Value Ref Range    WBC Count 12.9 (H) 4.0 - 11.0 10e3/uL    RBC  Count 5.14 4.40 - 5.90 10e6/uL    Hemoglobin 14.6 13.3 - 17.7 g/dL    Hematocrit 43.8 40.0 - 53.0 %    MCV 85 78 - 100 fL    MCH 28.4 26.5 - 33.0 pg    MCHC 33.3 31.5 - 36.5 g/dL    RDW 12.8 10.0 - 15.0 %    Platelet Count 210 150 - 450 10e3/uL    % Neutrophils 88 %    % Lymphocytes 9 %    % Monocytes 3 %    % Eosinophils 0 %    % Basophils 0 %    % Immature Granulocytes 0 %    NRBCs per 100 WBC 0 <1 /100    Absolute Neutrophils 11.3 (H) 1.6 - 8.3 10e3/uL    Absolute Lymphocytes 1.2 0.8 - 5.3 10e3/uL    Absolute Monocytes 0.3 0.0 - 1.3 10e3/uL    Absolute Eosinophils 0.0 0.0 - 0.7 10e3/uL    Absolute Basophils 0.0 0.0 - 0.2 10e3/uL    Absolute Immature Granulocytes 0.0 <=0.4 10e3/uL    Absolute NRBCs 0.0 10e3/uL   Extra Red Top Tube   Result Value Ref Range    Hold Specimen JIC    Extra Blue Top Tube   Result Value Ref Range    Hold Specimen JIC    CT Abdomen Pelvis w/o Contrast    Narrative    EXAM: CT ABDOMEN PELVIS W/O CONTRAST  LOCATION: [facility]  DATE/TIME: 12/16/2022 4:46 PM    INDICATION: Right-sided flank pain, history of kidney stones.  COMPARISON: 06/05/2022.  TECHNIQUE: CT scan of the abdomen and pelvis was performed without IV contrast. Multiplanar reformats were obtained. Dose reduction techniques were used.  CONTRAST: None.    FINDINGS:   LOWER CHEST: Linear atelectasis. Small esophageal hiatal hernia.    HEPATOBILIARY: Normal.    PANCREAS: Normal.    SPLEEN: Normal.    ADRENAL GLANDS: Normal.    KIDNEYS/BLADDER: 3 mm stone distal right ureter beyond the iliac crossing with moderate right-sided hydronephrosis. Right perinephric soft tissue stranding. A few small stones in the right kidney. Tiny punctate nonobstructing stone in the lower pole left   kidney again seen.    BOWEL: Previous appendectomy.    LYMPH NODES: Normal.    VASCULATURE: Unremarkable.    PELVIC ORGANS: Normal.    MUSCULOSKELETAL: Mild degenerative changes.      Impression    IMPRESSION:   1.  3 mm stone distal  right ureter beyond the iliac crossing with moderate right-sided hydronephrosis. Two small stones lower pole right kidney with a tiny punctate nonobstructing stone lower pole left kidney.    2.  Small esophageal hiatal hernia.    3.  Tiny fat-containing left inguinal hernia.               Emergency Department Course:    Reviewed:  I reviewed nursing notes, vitals, and past medical history    Assessments:   I obtained history and examined the patient as noted above.     Interventions:  Medications   ondansetron (ZOFRAN) injection 4 mg (4 mg Intravenous Given 12/16/22 1612)   0.9% sodium chloride BOLUS (1,000 mLs Intravenous New Bag 12/16/22 1612)   ketorolac (TORADOL) injection 15 mg (15 mg Intravenous Given 12/16/22 1612)        Disposition:  The patient was discharged to home.     Impression & Plan      Medical Decision Making:  Ashutosh Moraes is a 57 year old male with history of recurrent kidney stones, type 2 diabetes, and chronic kidney disease, who presents for evaluation of flank pain. Please see HPI and physical exam for full details. Differential includes ureteral stone, appendicitis, diverticulitis, bowel obstruction, SHELLI, dehydration, infected stone, among others. Patient has a reassuring physical exam with normal vitals. He is afebrile, and urine is negative for infection. He has a mild leukocytosis, which is non-specific in nature and could be reactive. Less concern for septic stone with non-infected urine. Creatinine is actually improved from 6 months ago. CT reveals 3 mm distal ureteral stone with moderate hydronephrosis. His pain is controlled with interventions in the ED, and he is taking PO. At this time, he is appropriate for outpatient therapy, and I provided prescriptions for oxycodone, zofran, and flomax. Return precautions are discussed. Follow up is indicated with his urologist, with whom he has already established care, as well as his nephrologist, who follows his renal function. He is  comfortable with the plan, and he is discharged in stable condition.     Critical Care time:  was 0 minutes for this patient excluding procedures.    Diagnosis:    ICD-10-CM    1. Ureteral stone  N20.1            Discharge Medications:  New Prescriptions    ONDANSETRON (ZOFRAN ODT) 4 MG ODT TAB    Take 1 tablet (4 mg) by mouth every 8 hours as needed for nausea    OXYCODONE (ROXICODONE) 5 MG TABLET    Take 1 tablet (5 mg) by mouth every 6 hours as needed for severe pain (7-10)    TAMSULOSIN (FLOMAX) 0.4 MG CAPSULE    Take 1 capsule (0.4 mg) by mouth daily for 7 days        12/16/2022   Nava Santana PA-C Sells, Jenna, PA-C  12/16/22 1924       Nava Santana PA-C  12/16/22 1925

## 2022-12-16 NOTE — ED TRIAGE NOTES
Pt with R flank pain since this morning. Hx kidney stones, feels similar. Nausea, vomiting. ABC intact.

## 2023-03-25 ENCOUNTER — HEALTH MAINTENANCE LETTER (OUTPATIENT)
Age: 58
End: 2023-03-25

## 2023-03-26 ENCOUNTER — HOSPITAL ENCOUNTER (OUTPATIENT)
Facility: CLINIC | Age: 58
Setting detail: OBSERVATION
Discharge: HOME | End: 2023-03-27
Attending: EMERGENCY MEDICINE | Admitting: INTERNAL MEDICINE
Payer: COMMERCIAL

## 2023-03-26 ENCOUNTER — APPOINTMENT (OUTPATIENT)
Dept: GENERAL RADIOLOGY | Facility: CLINIC | Age: 58
End: 2023-03-26
Attending: EMERGENCY MEDICINE
Payer: COMMERCIAL

## 2023-03-26 DIAGNOSIS — I25.10 CORONARY ARTERY DISEASE INVOLVING NATIVE HEART, UNSPECIFIED VESSEL OR LESION TYPE, UNSPECIFIED WHETHER ANGINA PRESENT: ICD-10-CM

## 2023-03-26 DIAGNOSIS — R07.9 CHEST PAIN, UNSPECIFIED TYPE: ICD-10-CM

## 2023-03-26 DIAGNOSIS — I10 UNCONTROLLED HYPERTENSION: ICD-10-CM

## 2023-03-26 LAB
ALBUMIN SERPL BCG-MCNC: 4.4 G/DL (ref 3.5–5.2)
ALP SERPL-CCNC: 132 U/L (ref 40–129)
ALT SERPL W P-5'-P-CCNC: 36 U/L (ref 10–50)
ANION GAP SERPL CALCULATED.3IONS-SCNC: 11 MMOL/L (ref 7–15)
AST SERPL W P-5'-P-CCNC: 30 U/L (ref 10–50)
BASOPHILS # BLD AUTO: 0.1 10E3/UL (ref 0–0.2)
BASOPHILS NFR BLD AUTO: 1 %
BILIRUB SERPL-MCNC: 0.4 MG/DL
BUN SERPL-MCNC: 18.7 MG/DL (ref 6–20)
CALCIUM SERPL-MCNC: 9 MG/DL (ref 8.6–10)
CHLORIDE SERPL-SCNC: 102 MMOL/L (ref 98–107)
CREAT SERPL-MCNC: 1.7 MG/DL (ref 0.67–1.17)
D DIMER PPP FEU-MCNC: <0.27 UG/ML FEU (ref 0–0.5)
DEPRECATED HCO3 PLAS-SCNC: 25 MMOL/L (ref 22–29)
EOSINOPHIL # BLD AUTO: 0.5 10E3/UL (ref 0–0.7)
EOSINOPHIL NFR BLD AUTO: 5 %
ERYTHROCYTE [DISTWIDTH] IN BLOOD BY AUTOMATED COUNT: 13.1 % (ref 10–15)
GFR SERPL CREATININE-BSD FRML MDRD: 47 ML/MIN/1.73M2
GLUCOSE SERPL-MCNC: 170 MG/DL (ref 70–99)
HCT VFR BLD AUTO: 41.8 % (ref 40–53)
HGB BLD-MCNC: 14.2 G/DL (ref 13.3–17.7)
IMM GRANULOCYTES # BLD: 0 10E3/UL
IMM GRANULOCYTES NFR BLD: 0 %
LYMPHOCYTES # BLD AUTO: 2.7 10E3/UL (ref 0.8–5.3)
LYMPHOCYTES NFR BLD AUTO: 28 %
MCH RBC QN AUTO: 28.9 PG (ref 26.5–33)
MCHC RBC AUTO-ENTMCNC: 34 G/DL (ref 31.5–36.5)
MCV RBC AUTO: 85 FL (ref 78–100)
MONOCYTES # BLD AUTO: 0.9 10E3/UL (ref 0–1.3)
MONOCYTES NFR BLD AUTO: 9 %
NEUTROPHILS # BLD AUTO: 5.4 10E3/UL (ref 1.6–8.3)
NEUTROPHILS NFR BLD AUTO: 57 %
NRBC # BLD AUTO: 0 10E3/UL
NRBC BLD AUTO-RTO: 0 /100
PLATELET # BLD AUTO: 190 10E3/UL (ref 150–450)
POTASSIUM SERPL-SCNC: 3.7 MMOL/L (ref 3.4–5.3)
PROT SERPL-MCNC: 7.2 G/DL (ref 6.4–8.3)
RBC # BLD AUTO: 4.91 10E6/UL (ref 4.4–5.9)
SODIUM SERPL-SCNC: 138 MMOL/L (ref 136–145)
TROPONIN T SERPL HS-MCNC: 12 NG/L
WBC # BLD AUTO: 9.5 10E3/UL (ref 4–11)

## 2023-03-26 PROCEDURE — 36415 COLL VENOUS BLD VENIPUNCTURE: CPT | Performed by: EMERGENCY MEDICINE

## 2023-03-26 PROCEDURE — 99285 EMERGENCY DEPT VISIT HI MDM: CPT | Mod: 25

## 2023-03-26 PROCEDURE — 93005 ELECTROCARDIOGRAM TRACING: CPT

## 2023-03-26 PROCEDURE — 71046 X-RAY EXAM CHEST 2 VIEWS: CPT

## 2023-03-26 PROCEDURE — 83036 HEMOGLOBIN GLYCOSYLATED A1C: CPT | Performed by: HOSPITALIST

## 2023-03-26 PROCEDURE — 85379 FIBRIN DEGRADATION QUANT: CPT | Performed by: EMERGENCY MEDICINE

## 2023-03-26 PROCEDURE — 84484 ASSAY OF TROPONIN QUANT: CPT | Performed by: EMERGENCY MEDICINE

## 2023-03-26 PROCEDURE — 85025 COMPLETE CBC W/AUTO DIFF WBC: CPT | Performed by: EMERGENCY MEDICINE

## 2023-03-26 PROCEDURE — 80053 COMPREHEN METABOLIC PANEL: CPT | Performed by: EMERGENCY MEDICINE

## 2023-03-26 ASSESSMENT — ENCOUNTER SYMPTOMS
SHORTNESS OF BREATH: 0
BACK PAIN: 0

## 2023-03-27 ENCOUNTER — APPOINTMENT (OUTPATIENT)
Dept: CARDIOLOGY | Facility: CLINIC | Age: 58
End: 2023-03-27
Attending: INTERNAL MEDICINE
Payer: COMMERCIAL

## 2023-03-27 VITALS
OXYGEN SATURATION: 95 % | SYSTOLIC BLOOD PRESSURE: 150 MMHG | TEMPERATURE: 97.7 F | RESPIRATION RATE: 16 BRPM | HEART RATE: 98 BPM | DIASTOLIC BLOOD PRESSURE: 93 MMHG

## 2023-03-27 LAB
ATRIAL RATE - MUSE: 69 BPM
DIASTOLIC BLOOD PRESSURE - MUSE: NORMAL MMHG
GLUCOSE BLDC GLUCOMTR-MCNC: 108 MG/DL (ref 70–99)
GLUCOSE BLDC GLUCOMTR-MCNC: 123 MG/DL (ref 70–99)
GLUCOSE BLDC GLUCOMTR-MCNC: 126 MG/DL (ref 70–99)
HBA1C MFR BLD: 6.9 %
INTERPRETATION ECG - MUSE: NORMAL
P AXIS - MUSE: 41 DEGREES
PR INTERVAL - MUSE: 154 MS
QRS DURATION - MUSE: 100 MS
QT - MUSE: 402 MS
QTC - MUSE: 430 MS
R AXIS - MUSE: -6 DEGREES
SYSTOLIC BLOOD PRESSURE - MUSE: NORMAL MMHG
T AXIS - MUSE: 46 DEGREES
TROPONIN T SERPL HS-MCNC: 13 NG/L
TROPONIN T SERPL HS-MCNC: 13 NG/L
VENTRICULAR RATE- MUSE: 69 BPM

## 2023-03-27 PROCEDURE — 93350 STRESS TTE ONLY: CPT | Mod: 26 | Performed by: INTERNAL MEDICINE

## 2023-03-27 PROCEDURE — 82962 GLUCOSE BLOOD TEST: CPT

## 2023-03-27 PROCEDURE — 93321 DOPPLER ECHO F-UP/LMTD STD: CPT | Mod: 26 | Performed by: INTERNAL MEDICINE

## 2023-03-27 PROCEDURE — 84484 ASSAY OF TROPONIN QUANT: CPT | Performed by: INTERNAL MEDICINE

## 2023-03-27 PROCEDURE — 255N000002 HC RX 255 OP 636: Performed by: INTERNAL MEDICINE

## 2023-03-27 PROCEDURE — 93018 CV STRESS TEST I&R ONLY: CPT | Performed by: INTERNAL MEDICINE

## 2023-03-27 PROCEDURE — 999N000208 ECHO STRESS ECHOCARDIOGRAM

## 2023-03-27 PROCEDURE — 99207 PR NO BILLABLE SERVICE THIS VISIT: CPT | Performed by: HOSPITALIST

## 2023-03-27 PROCEDURE — 99234 HOSP IP/OBS SM DT SF/LOW 45: CPT | Performed by: INTERNAL MEDICINE

## 2023-03-27 PROCEDURE — 36415 COLL VENOUS BLD VENIPUNCTURE: CPT | Performed by: INTERNAL MEDICINE

## 2023-03-27 PROCEDURE — G0378 HOSPITAL OBSERVATION PER HR: HCPCS

## 2023-03-27 PROCEDURE — 93325 DOPPLER ECHO COLOR FLOW MAPG: CPT | Mod: 26 | Performed by: INTERNAL MEDICINE

## 2023-03-27 PROCEDURE — 250N000013 HC RX MED GY IP 250 OP 250 PS 637: Performed by: EMERGENCY MEDICINE

## 2023-03-27 PROCEDURE — 93016 CV STRESS TEST SUPVJ ONLY: CPT | Performed by: INTERNAL MEDICINE

## 2023-03-27 PROCEDURE — 250N000013 HC RX MED GY IP 250 OP 250 PS 637: Performed by: INTERNAL MEDICINE

## 2023-03-27 PROCEDURE — 93321 DOPPLER ECHO F-UP/LMTD STD: CPT | Mod: TC

## 2023-03-27 RX ORDER — ALLOPURINOL 100 MG/1
100 TABLET ORAL EVERY EVENING
COMMUNITY
Start: 2023-02-20

## 2023-03-27 RX ORDER — INSULIN GLARGINE-YFGN 100 [IU]/ML
50 INJECTION, SOLUTION SUBCUTANEOUS 2 TIMES DAILY
COMMUNITY
Start: 2023-02-27

## 2023-03-27 RX ORDER — AMLODIPINE BESYLATE 5 MG/1
10 TABLET ORAL EVERY EVENING
Status: DISCONTINUED | OUTPATIENT
Start: 2023-03-27 | End: 2023-03-27 | Stop reason: HOSPADM

## 2023-03-27 RX ORDER — NICOTINE POLACRILEX 4 MG
15-30 LOZENGE BUCCAL
Status: DISCONTINUED | OUTPATIENT
Start: 2023-03-27 | End: 2023-03-27 | Stop reason: HOSPADM

## 2023-03-27 RX ORDER — ERGOCALCIFEROL 1.25 MG/1
50000 CAPSULE, LIQUID FILLED ORAL
COMMUNITY
Start: 2023-01-22

## 2023-03-27 RX ORDER — NITROGLYCERIN 0.4 MG/1
0.4 TABLET SUBLINGUAL EVERY 5 MIN PRN
Status: DISCONTINUED | OUTPATIENT
Start: 2023-03-27 | End: 2023-03-27 | Stop reason: HOSPADM

## 2023-03-27 RX ORDER — AMLODIPINE BESYLATE 10 MG/1
10 TABLET ORAL EVERY EVENING
COMMUNITY
Start: 2022-12-28

## 2023-03-27 RX ORDER — ALLOPURINOL 100 MG/1
100 TABLET ORAL EVERY EVENING
Status: DISCONTINUED | OUTPATIENT
Start: 2023-03-27 | End: 2023-03-27 | Stop reason: HOSPADM

## 2023-03-27 RX ORDER — ASPIRIN 81 MG/1
324 TABLET, CHEWABLE ORAL ONCE
Status: COMPLETED | OUTPATIENT
Start: 2023-03-27 | End: 2023-03-27

## 2023-03-27 RX ORDER — ASPIRIN 81 MG/1
162 TABLET, CHEWABLE ORAL ONCE
Status: COMPLETED | OUTPATIENT
Start: 2023-03-27 | End: 2023-03-27

## 2023-03-27 RX ORDER — MAGNESIUM HYDROXIDE/ALUMINUM HYDROXICE/SIMETHICONE 120; 1200; 1200 MG/30ML; MG/30ML; MG/30ML
30 SUSPENSION ORAL EVERY 4 HOURS PRN
Status: DISCONTINUED | OUTPATIENT
Start: 2023-03-27 | End: 2023-03-27 | Stop reason: HOSPADM

## 2023-03-27 RX ORDER — ROSUVASTATIN CALCIUM 20 MG/1
40 TABLET, COATED ORAL EVERY EVENING
Status: DISCONTINUED | OUTPATIENT
Start: 2023-03-27 | End: 2023-03-27 | Stop reason: HOSPADM

## 2023-03-27 RX ORDER — DULAGLUTIDE 4.5 MG/.5ML
4.5 INJECTION, SOLUTION SUBCUTANEOUS
COMMUNITY
Start: 2023-02-16

## 2023-03-27 RX ORDER — ASPIRIN 81 MG/1
81 TABLET ORAL DAILY
Status: DISCONTINUED | OUTPATIENT
Start: 2023-03-28 | End: 2023-03-27 | Stop reason: HOSPADM

## 2023-03-27 RX ORDER — DEXTROSE MONOHYDRATE 25 G/50ML
25-50 INJECTION, SOLUTION INTRAVENOUS
Status: DISCONTINUED | OUTPATIENT
Start: 2023-03-27 | End: 2023-03-27 | Stop reason: HOSPADM

## 2023-03-27 RX ADMIN — HUMAN ALBUMIN MICROSPHERES AND PERFLUTREN 4 ML: 10; .22 INJECTION, SOLUTION INTRAVENOUS at 13:06

## 2023-03-27 RX ADMIN — ASPIRIN 81 MG 162 MG: 81 TABLET ORAL at 08:10

## 2023-03-27 RX ADMIN — ASPIRIN 81 MG 324 MG: 81 TABLET ORAL at 00:45

## 2023-03-27 ASSESSMENT — ACTIVITIES OF DAILY LIVING (ADL)
ADLS_ACUITY_SCORE: 35

## 2023-03-27 NOTE — ED TRIAGE NOTES
Arrives from home with wife. States that all day has been having chest pain, points to the center of his chest. Denies SOB and radiating pain. States took Mylanta 45 minutes ago without relief.     States ten years ago had a stent placed.

## 2023-03-27 NOTE — DISCHARGE SUMMARY
Discharge Summary  Hospitalist Service      Ashutosh Moraes MRN# [id number]   YOB: 1965 Age: 57 year old     Date of Admission:  3/26/2023  Date of Discharge:  3/27/2023  Admitting Physician:  Anita Box MD, MD  Discharge Physician: Amara Ontiveros MD   Discharging Service: Hospitalist Service     Primary Provider: *Kathy Chandra  Primary Care Physician Phone Number: [phone]         Discharge Diagnoses/Problem Oriented Hospital Course (Providers):      Discharge Diagnoses   Chest pain, atypical for acute ACS  Diabetes type 2.  Obstructive sleep apnea.  Hypertension and hyperlipidemia.  CKD stage 3  Hospital Course   Ashutosh Moraes is a 57 year old male admitted on 3/26/2023. He has a history of obesity, diabetes, hypertension, hyperlipidemia, obstructive sleep apnea, history of coronary artery disease with PCI in 2009 who presents with pressure in his chest.  Its in the lower sternum.  It has been steady with an intensity of 6 out of 10 since 1130 this morning.  No associated nausea or vomiting or shortness of breath or dizziness.  He took some Mylanta but is not sure it helped him.  [facility].  His pain has improved.  I am asked to admit him.     1.  Chest pain, atypical for acute ACS.  Multiple risk factors to include prior history of coronary artery disease.  -Stress chest performed today negative for inducible ischemia.  Tropes have been negative ECG showed sinus rhythm moderate voltage criteria.  D-dimer negative, negative CXR     2.  Diabetes type 2.  -Sliding scale insulin.  Takes insulin Lantus 50 units PPI daily however blood sugars in the 100s and currently n.p.o. will hold off for now  -Discussed with patient if he is experiencing any low blood sugars or hypoglycemia episodes he states he checks his sugars regularly and has had rare occasions of hypoglycemia we will continue his insulin regimen at home and he is to follow-up with PCP to discuss further if any  changes needs to be done    3.  Obstructive sleep apnea.  -CPAP nightly.     4.  Hypertension and hyperlipidemia.  -Resume home meds once reconciled.     5. CKD stage 3.  - avoid nephrotoxins.           Code Status:      Full Code         Important Results:                  Pending Results:        Unresulted Labs Ordered in the Past 30 Days of this Admission     No orders found for last 31 day(s).               Discharge Instructions and Follow-Up:               Discharge Disposition:        Discharged to home          Discharge Medications:        Current Discharge Medication List      CONTINUE these medications which have NOT CHANGED    Details   Insulin Glargine-yfgn 100 UNIT/ML SOPN Inject 50 Units Subcutaneous 2 times daily      omeprazole (PRILOSEC) 20 MG DR capsule Take 20 mg by mouth every evening      allopurinol (ZYLOPRIM) 100 MG tablet Take 100 mg by mouth every evening      amLODIPine (NORVASC) 10 MG tablet Take 10 mg by mouth every evening      aspirin 81 MG EC tablet Take 81 mg by mouth daily       rosuvastatin (CRESTOR) 40 MG tablet Take 40 mg by mouth every evening      TRULICITY 4.5 MG/0.5ML SOPN Inject 4.5 mg Subcutaneous every 7 days On Wednesday      vitamin D2 (ERGOCALCIFEROL) 62110 units (1250 mcg) capsule Take 50,000 Units by mouth every 7 days On tuesdays                  Allergies:         Allergies   Allergen Reactions     Atorvastatin Rash            Consultations This Hospital Stay:        No consultations were requested during this admission          Condition and Physical Exam on Discharge:        Discharge condition: Stable   Discharge vitals: Blood pressure (!) 150/93, pulse 98, temperature 97.7  F (36.5  C), temperature source Oral, resp. rate 16, SpO2 95 %.   General: Pt in NAD, normal appearance  HEENT: OP clear MMM, no JVD  Lungs: Clear to Auscultation Bilateral, normal breathing  without accessory muscle usage, no wheezing, rhonchi or crackles  Cardiac: +S1, S2, RRR, no MRG, no  edema  Abdominal: normal bowel sounds, NT/ND, no hepatosplenomegaly  Skin: warm, dry, normal turgor, no rash  Psyche: A& O x3, appropriate affect            Discharge Orders for Skilled Facility (from Discharge Orders):        After Care Instructions     Activity      Your activity upon discharge: activity as tolerated         Diet      Follow this diet upon discharge: Orders Placed This Encounter      Combination Diet Moderate Consistent Carb (60 g CHO per Meal) Diet                    Rehab orders for Skilled Facility (from Discharge Orders):               Discharge Time:      Greater than 30 minutes.        Image Results From This Hospital Stay (For Non-EPIC Providers):        Results for orders placed or performed during the hospital encounter of 03/26/23   XR Chest 2 Views    Narrative    EXAM: XR CHEST 2 VIEWS  LOCATION: [facility]  DATE/TIME: 3/26/2023 11:36 PM    INDICATION: chest pain  COMPARISON: 10/14/2022      Impression    IMPRESSION: Degenerative changes thoracic spine. Otherwise negative chest.           Most Recent Lab Results In EPIC (For Non-EPIC Providers):    Most Recent 3 CBC's:  Recent Labs   Lab Test 03/26/23 2155 12/16/22  1613 06/05/22  0317   WBC 9.5 12.9* 10.0   HGB 14.2 14.6 14.2   MCV 85 85 84    210 194      Most Recent 3 BMP's:  Recent Labs   Lab Test 03/27/23  1525 03/27/23  1216 03/27/23  0954 03/26/23  2155 12/16/22  1613 12/16/22  1613 06/05/22  0317   NA  --   --   --  138  --  137 138   POTASSIUM  --   --   --  3.7  --  5.1 3.5   CHLORIDE  --   --   --  102  --  100 106   CO2  --   --   --  25  --  24 27   BUN  --   --   --  18.7  --  23.4* 21   CR  --   --   --  1.70*  --  1.79* 1.90*   ANIONGAP  --   --   --  11  --  13 5   KATIANA  --   --   --  9.0  --  9.1 8.8   * 108* 123* 170*   < > 142* 196*    < > = values in this interval not displayed.     Most Recent 3 Troponin's:No lab results found.  Most Recent 3 INR's:No lab results found.  Most  Recent 2 LFT's:  Recent Labs   Lab Test 03/26/23 2155 05/11/18  0825   AST 30  --    ALT 36 <5*   ALKPHOS 132*  --    BILITOTAL 0.4  --      Most Recent Cholesterol Panel:  Recent Labs   Lab Test 05/11/18  0825   CHOL 165   LDL 92   HDL 41   TRIG 159*     Most Recent 6 Bacteria Isolates From Any Culture (See EPIC Reports for Culture Details):  Recent Labs   Lab Test 11/14/20 2156   CULT No growth     Most Recent TSH, T4 and HgbA1c:  Recent Labs   Lab Test 03/26/23 2155   A1C 6.9*

## 2023-03-27 NOTE — ED NOTES
[facility] [place]  ED Nurse Handoff Report    Ashutosh Moraes is a 57 year old male   ED Chief complaint: Chest Pain  . ED Diagnosis:   Final diagnoses:   Chest pain, unspecified type   Uncontrolled hypertension   Coronary artery disease involving native heart, unspecified vessel or lesion type, unspecified whether angina present     Allergies:   Allergies   Allergen Reactions    Atorvastatin Rash       Code Status: Full Code  Activity level - Baseline/Home:  Independent. Activity Level - Current:   Stand by Assist. Lift room needed: No. Bariatric: No   Needed: No   Isolation: No. Infection: Not Applicable.     Vital Signs:   Vitals:    03/27/23 0400 03/27/23 0402 03/27/23 0500 03/27/23 0530   BP: (!) 136/90  (!) 154/95 (!) 155/95   Pulse: 67 68 71 96   Resp: 16 15 13 28   Temp:       TempSrc:       SpO2: 97% 97% 95% 97%       Cardiac Rhythm:  ,      Pain level:    Patient confused: No. Patient Falls Risk: No.   Elimination Status: Has voided   Patient Report - Initial Complaint: Chest Pain. Focused Assessment: Ashutosh Moraes is a 57 year old male with a history of CAD and stents who presents with constant mid chest pressure that started early afternoon today. He denies chest pain or shortness of breath. It is not exertional. He mentions that it feels like acid reflux. The pressure is better currently but it is still there. He has a history of CAD and has a coronary artery stent which was placed 10 years ago. The coronary artery was 98% blocked and at that time the pain radiated to his jaw. He denies new back pain.     Tests Performed:   XR Chest 2 Views   Final Result   IMPRESSION: Degenerative changes thoracic spine. Otherwise negative chest.        Labs Ordered and Resulted from Time of ED Arrival to Time of ED Departure   COMPREHENSIVE METABOLIC PANEL - Abnormal       Result Value    Sodium 138      Potassium 3.7      Chloride 102      Carbon Dioxide (CO2) 25      Anion Gap 11      Urea Nitrogen  18.7      Creatinine 1.70 (*)     Calcium 9.0      Glucose 170 (*)     Alkaline Phosphatase 132 (*)     AST 30      ALT 36      Protein Total 7.2      Albumin 4.4      Bilirubin Total 0.4      GFR Estimate 47 (*)    TROPONIN T, HIGH SENSITIVITY - Normal    Troponin T, High Sensitivity 12     D DIMER QUANTITATIVE - Normal    D-Dimer Quantitative <0.27     CBC WITH PLATELETS AND DIFFERENTIAL    WBC Count 9.5      RBC Count 4.91      Hemoglobin 14.2      Hematocrit 41.8      MCV 85      MCH 28.9      MCHC 34.0      RDW 13.1      Platelet Count 190      % Neutrophils 57      % Lymphocytes 28      % Monocytes 9      % Eosinophils 5      % Basophils 1      % Immature Granulocytes 0      NRBCs per 100 WBC 0      Absolute Neutrophils 5.4      Absolute Lymphocytes 2.7      Absolute Monocytes 0.9      Absolute Eosinophils 0.5      Absolute Basophils 0.1      Absolute Immature Granulocytes 0.0      Absolute NRBCs 0.0     TROPONIN I     . Abnormal Results: .   Treatments provided: see MAR  Family Comments: NA  OBS brochure/video discussed/provided to patient:  N/A  ED Medications:   Medications   aspirin (ASA) chewable tablet 162 mg (has no administration in time range)   aspirin EC tablet 81 mg (has no administration in time range)   nitroGLYcerin (NITROSTAT) sublingual tablet 0.4 mg (has no administration in time range)   alum & mag hydroxide-simethicone (MAALOX) suspension 30 mL (has no administration in time range)   aspirin (ASA) chewable tablet 324 mg (324 mg Oral $Given 3/27/23 0045)     Drips infusing:  No  For the majority of the shift, the patient's behavior Green. Interventions performed were .    Sepsis treatment initiated: No     Patient tested for COVID 19 prior to admission: NO    ED Nurse Name/Phone Number: Rosalva Ham RN,   5:49 AM

## 2023-03-27 NOTE — H&P
[facility]    History and Physical - Hospitalist Service       Date of Admission:  3/26/2023    Assessment & Plan      Ashutosh Moraes is a 57 year old male admitted on 3/26/2023. He has a history of obesity, diabetes, hypertension, hyperlipidemia, obstructive sleep apnea, history of coronary artery disease with PCI in 2009 who presents with pressure in his chest.  Its in the lower sternum.  It has been steady with an intensity of 6 out of 10 since 1130 this morning.  No associated nausea or vomiting or shortness of breath or dizziness.  He took some Mylanta but is not sure it helped him.  [facility].  His pain has improved.  I am asked to admit him.    1.  Chest pain, atypical for acute ACS.  Multiple risk factors to include prior history of coronary artery disease.  -Admit to observation.  -Telemetry monitoring.  -Stress test in a.m.    2.  Diabetes type 2.  -Sliding scale insulin.    3.  Obstructive sleep apnea.  -CPAP nightly.    4.  Hypertension and hyperlipidemia.  -Resume home meds once reconciled.    5. CKD stage 3.  - avoid nephrotoxins.       Diet:   cardiac.  DVT Prophylaxis: Low Risk/Ambulatory with no VTE prophylaxis indicated  Trent Catheter: Not present  Lines: None     Cardiac Monitoring: None  Code Status:   Full Code.    Clinically Significant Risk Factors Present on Admission                  # Hypertension: home medication list includes antihypertensive(s)              Disposition Plan      Expected Discharge Date: 03/28/2023                  Anita Box MD  Hospitalist Service  [facility]  Securely message with [employer] (more info)  Text page via [employer] Paging/Directory     ______________________________________________________________________    Chief Complaint     Chest Pressure.    History is obtained from the patient    History of Present Illness   Ashutosh Moraes is a 57 year old male who has a history of obesity,  diabetes, hypertension, hyperlipidemia, obstructive sleep apnea, history of coronary artery disease with PCI in 2009 who presents with pressure in his chest.  Its in the lower sternum.  It has been steady with an intensity of 6 out of 10 since 1130 this morning.  No associated nausea or vomiting or shortness of breath or dizziness.  He took some Mylanta but is not sure it helped him.  [facility] Kim.  His pain has improved.  I am asked to admit him.         Past Medical History    Past Medical History:   Diagnosis Date     CAD (coronary artery disease)     Cardiac cath 7/2009: RAMANA to [facility]     Diabetes (H)     Type 2     Gastro-oesophageal reflux disease      HLD (hyperlipidemia)      Hypertension      Kidney stones      Pancreatic disease      Sleep apnea     Doesn't use a CPAP     Stented coronary artery 2009       Past Surgical History   Past Surgical History:   Procedure Laterality Date     APPENDECTOMY       COLONOSCOPY  2020     COMBINED CYSTOSCOPY, RETROGRADES, URETEROSCOPY, LASER HOLMIUM LITHOTRIPSY URETER(S), INSERT STENT Right 1/21/2021    Procedure: Cystoscopy, right ureteroscopy with laser lithotripsy, right ureteroscopy with stone basketing, right retrograde pyelogram, right ureteral stent placement, fluoroscopic interpretation <1 hour physician time;  Surgeon: Hudson Montana MD;  Location: [facility] OR     COMBINED CYSTOSCOPY, RETROGRADES, URETEROSCOPY, LASER HOLMIUM LITHOTRIPSY URETER(S), INSERT STENT Left 5/21/2021    Procedure: Cystoscopy, left retrograde pyelogram, left ureteroscopy and left ureteral stent placement;  Surgeon: Hudson Montana MD;  Location: [facility]     COMBINED CYSTOSCOPY, RETROGRADES, URETEROSCOPY, LASER HOLMIUM LITHOTRIPSY URETER(S), INSERT STENT Left 6/3/2021    Procedure: Cystoscopy, left ureteroscopy with stone basketing, stand by laser lithotripsy, left retrograde pyelogram, left ureteral stent exchange, fluoroscopic interpretation <1 hour physician time;   Surgeon: Hudson Montana MD;  Location: [facility]     COMBINED CYSTOSCOPY, RETROGRADES, URETEROSCOPY, LASER HOLMIUM LITHOTRIPSY URETER(S), INSERT STENT Right 10/28/2021    Procedure: Cystoscopy, right ureteroscopy with laser lithotripsy, right ureteroscopy with stone basketing, right retrograde pyelogram, right ureteral stent placement, fluoroscopic interpretation < 1 hour physician time;  Surgeon: Hudson Montana MD;  Location: [facility] OR     HEART CATH LEFT HEART CATH  07/02/2009    RAMANA to [facility]       Prior to Admission Medications   Prior to Admission Medications   Prescriptions Last Dose Informant Patient Reported? Taking?   TRULICITY 1.5 MG/0.5ML pen   Yes No   Sig: Inject 1.5 mg Subcutaneous every 7 days    acetaminophen (TYLENOL) 500 MG tablet   No No   Sig: Take 2 tablets (1,000 mg) by mouth every 6 hours as needed for mild pain   Patient not taking: Reported on 11/8/2021   amLODIPine (NORVASC) 5 MG tablet   No No   Sig: Take 1 tablet (5 mg) by mouth daily   aspirin 81 MG EC tablet   Yes No   Sig: Take 81 mg by mouth daily    ciprofloxacin (CIPRO) 500 MG tablet   Yes No   docusate sodium (COLACE) 100 MG capsule   No No   Sig: Take 1 capsule (100 mg) by mouth 2 times daily   Patient not taking: Reported on 11/8/2021   lisinopril-hydrochlorothiazide (ZESTORETIC) 20-12.5 MG tablet   No No   Sig: Take 1 tablet by mouth daily Do not resume this until instructed to do so by your primary care doctor when you kidney function has improved to your baseline.   Patient not taking: Reported on 11/8/2021   metFORMIN (GLUCOPHAGE-XR) 500 MG 24 hr tablet   No No   Sig: Take 1 tablet (500 mg) by mouth daily (with dinner) Do not resume this until your kidney function has improved back to your baseline.   Patient not taking: Reported on 11/8/2021   omeprazole (PRILOSEC) 20 MG DR capsule   Yes No   Sig: Take 20 mg by mouth daily   Patient not taking: Reported on 11/8/2021   rosuvastatin (CRESTOR) 40 MG tablet   Yes No   Sig: Take 40 mg  by mouth daily      Facility-Administered Medications: None        Review of Systems    The 10 point Review of Systems is negative other than noted in the HPI or here.     Social History   I have reviewed this patient's social history and updated it with pertinent information if needed.  Social History     Tobacco Use     Smoking status: Never     Smokeless tobacco: Never   Substance Use Topics     Alcohol use: Yes     Alcohol/week: 0.0 standard drinks     Comment: very rarely, once a year     Drug use: No       Family History   I have reviewed this patient's family history and updated it with pertinent information if needed.  Family History   Problem Relation Age of Onset     Asthma Mother      Coronary Artery Disease Father        Allergies   Allergies   Allergen Reactions     Atorvastatin Rash        Physical Exam   Vital Signs: Temp: 97.6  F (36.4  C) Temp src: Temporal BP: (!) 155/104 Pulse: 76   Resp: 18 SpO2: 99 % O2 Device: None (Room air)    Weight: 0 lbs 0 oz    Gen - AAO x 3, appears anxious.  Lungs - CTA B.  HEart - RR,S1+S2 nml, no m/g/r.  Abd - soft, NT, ND, + BS.  Ext - trace edema.    Medical Decision Making     40 MINUTES SPENT BY ME on the date of service doing chart review, history, exam, documentation & further activities per the note.      Data     I have personally reviewed the following data over the past 24 hrs:    9.5  \   14.2   / 190     138 102 18.7 /  170 (H)   3.7 25 1.70 (H) \       ALT: 36 AST: 30 AP: 132 (H) TBILI: 0.4   ALB: 4.4 TOT PROTEIN: 7.2 LIPASE: N/A       Trop: 12 BNP: N/A       INR:  N/A PTT:  N/A   D-dimer:  <0.27 Fibrinogen:  N/A       Imaging results reviewed over the past 24 hrs:   Recent Results (from the past 24 hour(s))   XR Chest 2 Views    Narrative    EXAM: XR CHEST 2 VIEWS  LOCATION: [facility]  DATE/TIME: 3/26/2023 11:36 PM    INDICATION: chest pain  COMPARISON: 10/14/2022      Impression    IMPRESSION: Degenerative changes thoracic spine.  Otherwise negative chest.

## 2023-03-27 NOTE — PLAN OF CARE
PRIMARY DIAGNOSIS: CHEST PAIN  OUTPATIENT/OBSERVATION GOALS TO BE MET BEFORE DISCHARGE:  1. Ruled out acute coronary syndrome (negative or stable Troponin):  Yes  2. Pain Status: Pain free.  3. Appropriate provocative testing performed: No  - Stress Test Procedure: Echo  - Interpretation of cardiac rhythm per telemetry tech: NSR    4. Cleared by Consultants (if applicable):N/A  5. Return to near baseline physical activity: Yes  Discharge Planner Nurse   Safe discharge environment identified: Yes  Barriers to discharge: Yes       Entered by: Nessa Oro RN 03/27/2023 12:36 PM     Patient alert and oriented, chest pressure unchanged. BP improved but still elevated, patient was not able to take his blood pressure medication last night. New PIV placed in right lower forearm. Current at stress echo. Continue w/ current POC.   Please review provider order for any additional goals.   Nurse to notify provider when observation goals have been met and patient is ready for discharge.

## 2023-03-27 NOTE — ED NOTES
This RN paged the hospitalist regarding needs for pts bedtime medications, amlodipine, prilosec, statin

## 2023-03-27 NOTE — PHARMACY-ADMISSION MEDICATION HISTORY
Admission medication history interview status for this patient is complete. See [facility] admission navigator for allergy information, prior to admission medications and immunization status.     Medication history interview done, indicate source(s): Patient  Medication history resources (including written lists, pill bottles, clinic record):[place]  Pharmacy: [facility] [id number] [place] [facility] [id number]    All PTA meds were added    Actions taken by pharmacist (provider contacted, etc):Left provdier sticky note     Additional medication history information:    Insulin glargine: pt states he usually takes 50 units BID, but sometimes takes more or less. He states he will take his BG in the AM and if its >130 he will increase the amount of insulin he uses.   Looks like the prescribed range is 40-60 units.     Medication reconciliation/reorder completed by provider prior to medication history?  N   (Y/N)         For patients on insulin therapy:   Do you use sliding scale insulin based on blood sugars? no  Do you typically eat three meals a day? -  How many times do you check your blood glucose per day? Every morning  How many episodes of hypoglycemia do you typically have per month? rare  Do you have a Continuous Glucose Monitor (CGM)?  No    Prior to Admission medications    Medication Sig Last Dose Taking? Auth Provider Long Term End Date   Insulin Glargine-yfgn 100 UNIT/ML SOPN Inject 50 Units Subcutaneous 2 times daily 3/26/2023 at am Yes Unknown, Entered By History     omeprazole (PRILOSEC) 20 MG DR capsule Take 20 mg by mouth every evening 3/25/2023 at pm Yes Unknown, Entered By History     allopurinol (ZYLOPRIM) 100 MG tablet Take 100 mg by mouth every evening 3/25/2023 at pm  Unknown, Entered By History     amLODIPine (NORVASC) 10 MG tablet Take 10 mg by mouth every evening 3/25/2023 at pm  Unknown, Entered By History No    aspirin 81 MG EC tablet Take 81 mg by mouth daily  3/25/2023 at pm   Reported, Patient     rosuvastatin (CRESTOR) 40 MG tablet Take 40 mg by mouth every evening 3/25/2023 at pm  Unknown, Entered By History No    TRULICITY 4.5 MG/0.5ML SOPN Inject 4.5 mg Subcutaneous every 7 days On Wednesday 3/22/2023 at pm  Unknown, Entered By History No    vitamin D2 (ERGOCALCIFEROL) 03434 units (1250 mcg) capsule Take 50,000 Units by mouth every 7 days On tuesdays 3/21/2023 at pm  Unknown, Entered By History

## 2023-03-27 NOTE — PLAN OF CARE
PRIMARY DIAGNOSIS: CHEST PAIN  OUTPATIENT/OBSERVATION GOALS TO BE MET BEFORE DISCHARGE:  1. Ruled out acute coronary syndrome (negative or stable Troponin):  Yes  2. Pain Status: Rates chest pressure 4/10, states it is residual pain.  3. Appropriate provocative testing performed: No  - Stress Test Procedure: Regular  - Interpretation of cardiac rhythm per telemetry tech: SR    4. Cleared by Consultants (if applicable):No  5. Return to near baseline physical activity: Yes  Discharge Planner Nurse   Safe discharge environment identified: Yes  Barriers to discharge: Yes       Entered by: Nessa Oro RN 03/27/2023 0815     Patient alert and oriented, up independently. IV placed this morning. States his chest pressure is residual, not any worse than before. BP is elevated, patient states he has not taken his BP medication yet. Plan to have stress test today.   Please review provider order for any additional goals.   Nurse to notify provider when observation goals have been met and patient is ready for discharge.

## 2023-03-27 NOTE — ED NOTES
This writer spoke with the [facility] and asked for the hospitalist to be paged regarding at home medications.

## 2023-03-27 NOTE — ED PROVIDER NOTES
History     Chief Complaint:  Chest Pain       HPI   Ashutosh Moraes is a 57 year old male with a history of CAD and stents who presents with constant mid chest pressure that started early afternoon today. He denies chest pain or shortness of breath. It is not exertional. He mentions that it feels like acid reflux. The pressure is better currently but it is still there. He has a history of CAD and has a coronary artery stent which was placed 10 years ago. The coronary artery was 98% blocked and at that time the pain radiated to his jaw. He denies new back pain.      Independent Historian: the patient and wife    Review of External Notes: Reviewed May 20, 2021 hospitalization discharge summary    ROS:  Review of Systems   Respiratory: Negative for shortness of breath.    Cardiovascular: Negative for chest pain.        Chest pressure   Musculoskeletal: Negative for back pain.   All other systems reviewed and are negative.      Allergies:  Atorvastatin     Medications:    Norvasc  ASA 81  Cipro  Colace  Zestoretic  Glucophage  Prilosec  Crestor  Trulicity    Past Medical History:   CAD  Diabetes mellitus   GERD  Hyperlipidemia   Hypertension  Kidney stones  Pancreatic disease  Stented coronary artery    Past Surgical History:    Appendectomy  Colonoscopy  Cystoscopy  Ureteroscopy  Heart cath     Family History:    family history includes Asthma in his mother; Coronary Artery Disease in his father.    Social History:   reports that he has never smoked. He has never used smokeless tobacco. He reports current alcohol use. He reports that he does not use drugs.  PCP: Kathy Chandra     Physical Exam     Patient Vitals for the past 24 hrs:   BP Temp Temp src Pulse Resp SpO2   03/27/23 0402 -- -- -- 68 15 97 %   03/27/23 0400 (!) 136/90 -- -- 67 16 97 %   03/27/23 0300 (!) 161/105 -- -- 81 14 94 %   03/27/23 0259 -- -- -- 82 13 94 %   03/27/23 0249 (!) 170/106 -- -- 72 (!) 0 99 %   03/27/23 0029 (!) 155/104 -- -- 76 -- --    03/26/23 2135 (!) 172/107 97.6  F (36.4  C) Temporal 76 18 99 %        Physical Exam  Constitutional: Alert, attentive  HENT:    Nose: Nose normal.   Eyes: EOM are normal.   CV: regular rate and rhythm; no murmurs, rubs or gallups  Chest: Effort normal and breath sounds normal.   GI:  There is no tenderness. No distension. Normal bowel sounds  MSK: Normal range of motion.   Neurological: Alert, attentive  Skin: Skin is warm and dry.      Emergency Department Course   ECG  ECG taken at 0006, ECG read at 0008  Normal sinus rhythm  Moderate voltage criteria for LVH, may be normal variant   No significant changes as compared to prior, dated 10/28/21.  Rate 69 bpm. GA interval 154 ms. QRS duration 100 ms. QT/QTc 402/430 ms. P-R-T axes 41 -6 46.     Imaging:  XR Chest 2 Views   Final Result   IMPRESSION: Degenerative changes thoracic spine. Otherwise negative chest.         Report per radiology    Laboratory:  Labs Ordered and Resulted from Time of ED Arrival to Time of ED Departure   COMPREHENSIVE METABOLIC PANEL - Abnormal       Result Value    Sodium 138      Potassium 3.7      Chloride 102      Carbon Dioxide (CO2) 25      Anion Gap 11      Urea Nitrogen 18.7      Creatinine 1.70 (*)     Calcium 9.0      Glucose 170 (*)     Alkaline Phosphatase 132 (*)     AST 30      ALT 36      Protein Total 7.2      Albumin 4.4      Bilirubin Total 0.4      GFR Estimate 47 (*)    TROPONIN T, HIGH SENSITIVITY - Normal    Troponin T, High Sensitivity 12     D DIMER QUANTITATIVE - Normal    D-Dimer Quantitative <0.27     CBC WITH PLATELETS AND DIFFERENTIAL    WBC Count 9.5      RBC Count 4.91      Hemoglobin 14.2      Hematocrit 41.8      MCV 85      MCH 28.9      MCHC 34.0      RDW 13.1      Platelet Count 190      % Neutrophils 57      % Lymphocytes 28      % Monocytes 9      % Eosinophils 5      % Basophils 1      % Immature Granulocytes 0      NRBCs per 100 WBC 0      Absolute Neutrophils 5.4      Absolute Lymphocytes 2.7       Absolute Monocytes 0.9      Absolute Eosinophils 0.5      Absolute Basophils 0.1      Absolute Immature Granulocytes 0.0      Absolute NRBCs 0.0        Emergency Department Course & Assessments:             Interventions:  Medications   aspirin (ASA) chewable tablet 324 mg (324 mg Oral $Given 3/27/23 0045)        Assessments:  2331 I obtained a history and examined the patient   0022 I rechecked the patient and explained findings.     Independent Interpretation (X-rays, CTs, rhythm strip):  Chest x-ray shows no pneumothorax, pneumonia, or widened mediastinum.    Consultations/Discussion of Management or Tests:  0147 I spoke with Dr. Box, hospitalist, who agreed to admit the patient.     Disposition:  The patient was admitted to the hospital under the care of Dr. Nina.     Impression & Plan      Medical Decision Making:  This is a pleasant 57-year-old male with history of CAD and stents 10 years ago presents for evaluation of pressure-like chest pain.  EKG is nonischemic and initial troponin is negative.  D-dimer is negative, essentially ruling out PE the patient was low risk by Wells criteria.  Chest x-ray shows no widened mediastinum, pneumothorax, pneumonia.  Chest pain is resolved on recheck.  Aspirin is administered.  Given his heart score is greater than 4 and history of CAD, he is not appropriate for outpatient management.  He will be placed in the observation unit for serial enzymes and provocative testing and/or cardiology consult.  Of note, the patient has significantly elevated blood pressure initially on presentation.  On recheck it is improved, however this will need to be closely monitored as this could be contributing to his symptoms and certainly raises potential for sequela of uncontrolled hypertension.  No evidence of hypertensive emergency at this time.      Diagnosis:    ICD-10-CM    1. Chest pain, unspecified type  R07.9       2. Uncontrolled hypertension  I10       3. Coronary artery  disease involving native heart, unspecified vessel or lesion type, unspecified whether angina present  I25.10              Scribe Disclosure:  I, Aleksey Harman, am serving as a scribe at 11:31 PM on 3/26/2023 to document services personally performed by Daniel Kim MD based on my observations and the provider's statements to me.   3/26/2023   Daniel Kim MD [place]ough[place], John Eric, MD  03/27/23 0508

## 2023-06-09 DIAGNOSIS — N20.0 NEPHROLITHIASIS: Primary | ICD-10-CM

## 2023-06-23 ENCOUNTER — HOSPITAL ENCOUNTER (OUTPATIENT)
Dept: CT IMAGING | Facility: CLINIC | Age: 58
Discharge: HOME | End: 2023-06-23
Attending: STUDENT IN AN ORGANIZED HEALTH CARE EDUCATION/TRAINING PROGRAM | Admitting: STUDENT IN AN ORGANIZED HEALTH CARE EDUCATION/TRAINING PROGRAM
Payer: COMMERCIAL

## 2023-06-23 DIAGNOSIS — N20.0 NEPHROLITHIASIS: ICD-10-CM

## 2023-06-23 PROCEDURE — 74176 CT ABD & PELVIS W/O CONTRAST: CPT

## 2023-07-05 ENCOUNTER — VIRTUAL VISIT (OUTPATIENT)
Dept: UROLOGY | Facility: CLINIC | Age: 58
End: 2023-07-05
Payer: COMMERCIAL

## 2023-07-05 VITALS — WEIGHT: 260 LBS | HEIGHT: 68 IN | BODY MASS INDEX: 39.4 KG/M2

## 2023-07-05 DIAGNOSIS — N28.1 RENAL CYST, RIGHT: ICD-10-CM

## 2023-07-05 DIAGNOSIS — N20.0 NEPHROLITHIASIS: Primary | ICD-10-CM

## 2023-07-05 PROCEDURE — 99213 OFFICE O/P EST LOW 20 MIN: CPT | Mod: VID | Performed by: STUDENT IN AN ORGANIZED HEALTH CARE EDUCATION/TRAINING PROGRAM

## 2023-07-05 ASSESSMENT — PAIN SCALES - GENERAL: PAINLEVEL: NO PAIN (0)

## 2023-07-05 NOTE — PROGRESS NOTES
"** Patient will meet you in My Chart    Bill is a 57 year old who is being evaluated via a billable video visit.      How would you like to obtain your AVS? MyChart  If the video visit is dropped, the invitation should be resent by: Text to cell phone: [phone]  Will anyone else be joining your video visit? No        CHIEF COMPLAINT   Ashutosh Moraes who is a 56 yo M with long history of nephrolithiasis (both caox and uric acid) with multiple stone procedures in the past, now most recently s/p right URS 10/28/2021    HPI   Ashutosh Moraes is a 56 yo M with long history of nephrolithiasis (both caox and uric acid) with multiple stone procedures in the past, now most recently s/p right URS 10/28/2021    Patient has passed few stones spontaneously around Dec 2022    He regularly sees a nephrologist @ [facility] for medical stone prevention. He is on allopurinol    PHYSICAL EXAM  Patient is a 57 year old  male   Vitals: Height 1.727 m (5' 8\"), weight 117.9 kg (260 lb).  Body mass index is 39.53 kg/m .  General Appearance Adult:   Alert, no acute distress, oriented  HENT: throat/mouth:normal, good dentition  Lungs: no respiratory distress, or pursed lip breathing  Heart: No obvious jugular venous distension present  Abdomen: nondistended  Musculoskeltal: extremities normal, no peripheral edema  Skin: no suspicious lesions or rashes  Neuro: Alert, oriented, speech and mentation normal  Psych: affect and mood normal  Gait: Normal  All pertinent imaging reviewed:    Ct a/p 6/23/2023        ASSESSMENT and PLAN  56 yo M with long history of nephrolithiasis (both caox and uric acid) with multiple stone procedures in the past, now most recently s/p right URS 10/28/2021    Small right renal cyst, no intervention for this    Stable left lower pole stone, about 4 mm. Recommend observation for this, as it looks like it may be embedded    He is thinking about a [place] vacation over the Christmas Holiday this year. I recommend " repeating a CT at least a month prior to the vacation and have him follow up. Otherwise follow at least annually with CT prior    He should continue to follow with nephro for medical stone prevention and continue his allopurinol as ordered      Hudson Montana MD   [facility]  [facility] Phone: [phone]      Video-Visit Details    Type of service:  Video Visit     Originating Location (pt. Location): Home  Distant Location (provider location):  On-site  Platform used for Video Visit: Shara

## 2023-07-05 NOTE — Clinical Note
He is thinking about a [place] vacation over the Prit[place] Holiday this year. I recommend repeating a CT at least a month prior to the vacation and have him follow up. Otherwise follow at least annually with CT prior

## 2023-08-19 ENCOUNTER — HEALTH MAINTENANCE LETTER (OUTPATIENT)
Age: 58
End: 2023-08-19

## 2024-01-06 ENCOUNTER — HEALTH MAINTENANCE LETTER (OUTPATIENT)
Age: 59
End: 2024-01-06

## 2024-04-12 ENCOUNTER — OFFICE VISIT (OUTPATIENT)
Dept: CARDIOLOGY | Facility: CLINIC | Age: 59
End: 2024-04-12
Payer: COMMERCIAL

## 2024-04-12 VITALS
BODY MASS INDEX: 38.74 KG/M2 | DIASTOLIC BLOOD PRESSURE: 89 MMHG | HEART RATE: 67 BPM | SYSTOLIC BLOOD PRESSURE: 137 MMHG | WEIGHT: 255.6 LBS | HEIGHT: 68 IN

## 2024-04-12 DIAGNOSIS — I25.10 CORONARY ARTERY DISEASE INVOLVING NATIVE CORONARY ARTERY OF NATIVE HEART WITHOUT ANGINA PECTORIS: Primary | ICD-10-CM

## 2024-04-12 DIAGNOSIS — E11.9 DIABETES MELLITUS WITHOUT COMPLICATION (H): ICD-10-CM

## 2024-04-12 DIAGNOSIS — E66.01 MORBID OBESITY (H): ICD-10-CM

## 2024-04-12 PROCEDURE — 99204 OFFICE O/P NEW MOD 45 MIN: CPT | Performed by: INTERNAL MEDICINE

## 2024-04-12 PROCEDURE — 93000 ELECTROCARDIOGRAM COMPLETE: CPT | Performed by: INTERNAL MEDICINE

## 2024-04-12 RX ORDER — SILDENAFIL 100 MG/1
TABLET, FILM COATED ORAL
COMMUNITY
Start: 2023-05-14

## 2024-04-12 RX ORDER — TIRZEPATIDE 15 MG/.5ML
INJECTION, SOLUTION SUBCUTANEOUS
COMMUNITY
Start: 2024-02-05

## 2024-04-12 RX ORDER — BLOOD SUGAR DIAGNOSTIC
STRIP MISCELLANEOUS
COMMUNITY
Start: 2023-05-29

## 2024-04-12 RX ORDER — METOPROLOL SUCCINATE 25 MG/1
1 TABLET, EXTENDED RELEASE ORAL
COMMUNITY
Start: 2024-01-23

## 2024-04-12 RX ORDER — FAMOTIDINE 20 MG/1
1 TABLET, FILM COATED ORAL
COMMUNITY
Start: 2024-03-21

## 2024-04-12 NOTE — LETTER
4/12/2024    [place]  [address] [address]  [facility] [address]    RE: Ashutosh Moraes       Dear Colleague,     I had the pleasure of seeing Ashutosh Moraes in the [facility].  HPI and Plan:   Ashutosh Moraes is a 58 year old male who presents with history of coronary artery disease with remote stenting to his OM in 2009, morbid obesity, hypertension, hyperlipidemia, chronic kidney disease and type 2 diabetes and sleep apnea.  He is here to reestablish care.  Last year he was admitted from the emergency department with chest pain symptoms, he ruled out for myocardial infarction and underwent a stress echocardiogram which did not demonstrate evidence for ischemia.  He was ultimately diagnosed with peptic ulcer disease and started on acid suppressant which she believes has helped.  He has not had any recurrent chest discomfort since, he denies any difficulty breathing or palpitations.  He follows with the PMD on a regular basis who monitors his diabetes as well as his cholesterol levels and he is maintained on high intensity rosuvastatin.  I do not have any recent blood work to review except from his hospitalization.  March of last year he had renal insufficiency with a creatinine 1.7.  It looks like he had a severe insult back in 2021 and his creatinine had peaked at 5.39.  He tells me his creatinine has improved even from last year.  He does not get regular exercise but he travels a lot for work and does a lot of walking through airports and such.  He has no other questions or concerns today.  Exam is unremarkable  ECG today demonstrates a normal sinus rhythm without acute ST or T wave abnormality    Summary    1.  Coronary artery disease-asymptomatic, we discussed repeating stress testing to monitor for progression of ischemia given that he is a longstanding diabetic.  He would like to proceed so I have ordered a stress echocardiogram and we will go over the test results once  complete.    2.  Hyperlipidemia-he is maintained on high intensity rosuvastatin, recommend LDL near 55.  Recommend regular aerobic fitness to maintain HDL    3.  Morbid obesity-recommend portion control, heart healthy diet and regular exercise for weight loss    I am happy to see him back at any time for any of his future cardiovascular needs, please feel free to contact me with any questions you have regards to his care         Today's clinic visit entailed:  Review of external notes as documented elsewhere in note  Ordering of each unique test    Provider  Link to [facility] Help Grid     The level of medical decision making during this visit was of moderate complexity.    Orders Placed This Encounter   Procedures    EKG 12-lead complete w/read - Clinics (performed today)    Exercise Stress Echocardiogram     Orders Placed This Encounter   Medications    metoprolol succinate ER (TOPROL XL) 25 MG 24 hr tablet     Sig: Take 1 tablet by mouth daily at 2 pm    sildenafil (VIAGRA) 100 MG tablet     Sig: PLEASE SEE ATTACHED FOR DETAILED DIRECTIONS    MOUNJARO 15 MG/0.5ML pen     Sig: INJECT 0.5 ML (15 MG) SUBCUTANEOUS ONCE WEEKLY.    famotidine (PEPCID) 20 MG tablet     Sig: Take 1 tablet by mouth daily at 2 pm    ACCU-CHEK GUIDE test strip     Sig: DISPENSE ITEM COVERED BY PT INS. TEST 1 TIMES/DAY     There are no discontinued medications.      Encounter Diagnoses   Name Primary?    Coronary artery disease involving native coronary artery of native heart without angina pectoris Yes    Morbid obesity (H)     Diabetes mellitus without complication (H)        CURRENT MEDICATIONS:  Current Outpatient Medications   Medication Sig Dispense Refill    ACCU-CHEK GUIDE test strip DISPENSE ITEM COVERED BY PT INS. TEST 1 TIMES/DAY      allopurinol (ZYLOPRIM) 100 MG tablet Take 100 mg by mouth every evening      amLODIPine (NORVASC) 10 MG tablet Take 10 mg by mouth every evening      aspirin 81 MG EC tablet Take 81 mg by mouth daily        famotidine (PEPCID) 20 MG tablet Take 1 tablet by mouth daily at 2 pm      Insulin Glargine-yfgn 100 UNIT/ML SOPN Inject 50 Units Subcutaneous 2 times daily      metoprolol succinate ER (TOPROL XL) 25 MG 24 hr tablet Take 1 tablet by mouth daily at 2 pm      MOUNJARO 15 MG/0.5ML pen INJECT 0.5 ML (15 MG) SUBCUTANEOUS ONCE WEEKLY.      omeprazole (PRILOSEC) 20 MG DR capsule Take 20 mg by mouth every evening      rosuvastatin (CRESTOR) 40 MG tablet Take 40 mg by mouth every evening      sildenafil (VIAGRA) 100 MG tablet PLEASE SEE ATTACHED FOR DETAILED DIRECTIONS      TRULICITY 4.5 MG/0.5ML SOPN Inject 4.5 mg Subcutaneous every 7 days On Wednesday (Patient not taking: Reported on 4/12/2024)      vitamin D2 (ERGOCALCIFEROL) 74162 units (1250 mcg) capsule Take 50,000 Units by mouth every 7 days On tuesdays (Patient not taking: Reported on 7/5/2023)         ALLERGIES     Allergies   Allergen Reactions    Atorvastatin Rash       PAST MEDICAL HISTORY:  Past Medical History:   Diagnosis Date    CAD (coronary artery disease)     Cardiac cath 7/2009: RAMANA to OM    Diabetes (H)     Type 2    Gastro-oesophageal reflux disease     HLD (hyperlipidemia)     Hypertension     Kidney stones     Pancreatic disease     Sleep apnea     Doesn't use a CPAP    Stented coronary artery 2009       PAST SURGICAL HISTORY:  Past Surgical History:   Procedure Laterality Date    APPENDECTOMY      COLONOSCOPY  2020    COMBINED CYSTOSCOPY, RETROGRADES, URETEROSCOPY, LASER HOLMIUM LITHOTRIPSY URETER(S), INSERT STENT Right 1/21/2021    Procedure: Cystoscopy, right ureteroscopy with laser lithotripsy, right ureteroscopy with stone basketing, right retrograde pyelogram, right ureteral stent placement, fluoroscopic interpretation <1 hour physician time;  Surgeon: Hudson Montana MD;  Location: [facility]    COMBINED CYSTOSCOPY, RETROGRADES, URETEROSCOPY, LASER HOLMIUM LITHOTRIPSY URETER(S), INSERT STENT Left 5/21/2021    Procedure: Cystoscopy, left  retrograde pyelogram, left ureteroscopy and left ureteral stent placement;  Surgeon: Hudson Montana MD;  Location: [facility]    COMBINED CYSTOSCOPY, RETROGRADES, URETEROSCOPY, LASER HOLMIUM LITHOTRIPSY URETER(S), INSERT STENT Left 6/3/2021    Procedure: Cystoscopy, left ureteroscopy with stone basketing, stand by laser lithotripsy, left retrograde pyelogram, left ureteral stent exchange, fluoroscopic interpretation <1 hour physician time;  Surgeon: Hudson Montana MD;  Location: [facility] OR    COMBINED CYSTOSCOPY, RETROGRADES, URETEROSCOPY, LASER HOLMIUM LITHOTRIPSY URETER(S), INSERT STENT Right 10/28/2021    Procedure: Cystoscopy, right ureteroscopy with laser lithotripsy, right ureteroscopy with stone basketing, right retrograde pyelogram, right ureteral stent placement, fluoroscopic interpretation < 1 hour physician time;  Surgeon: Hudson Montana MD;  Location: [facility] OR    HEART CATH LEFT HEART CATH  07/02/2009    RAMANA to OM       FAMILY HISTORY:  Family History   Problem Relation Age of Onset    Asthma Mother     Coronary Artery Disease Father        SOCIAL HISTORY:  Social History     Socioeconomic History    Marital status:      Spouse name: None    Number of children: None    Years of education: None    Highest education level: None   Tobacco Use    Smoking status: Never    Smokeless tobacco: Never   Substance and Sexual Activity    Alcohol use: Yes     Alcohol/week: 0.0 standard drinks of alcohol     Comment: very rarely, once a year    Drug use: No   Other Topics Concern    Caffeine Concern No     Comment: 1 a day    Exercise No     Comment: occasional. recently joined [employer]       Review of Systems:  Skin:        Eyes:       ENT:       Respiratory:  Negative    Cardiovascular:  Negative;palpitations;chest pain;lightheadedness;dizziness;syncope or near-syncope;cyanosis;fatigue;exercise intolerance Positive for;edema  Gastroenterology:      Genitourinary:       Musculoskeletal:       Neurologic:      "  Psychiatric:       Heme/Lymph/Imm:       Endocrine:  Positive for diabetes    Physical Exam:  Vitals: /89   Pulse 67   Ht 1.727 m (5' 8\")   Wt 115.9 kg (255 lb 9.6 oz)   BMI 38.86 kg/m      Constitutional:  cooperative;in no acute distress obese      Skin:  warm and dry to the touch          Head:  normocephalic, no masses or lesions        Eyes:  pupils equal and round        Lymph:      ENT:  no pallor or cyanosis        Neck:  no carotid bruit        Respiratory:  clear to auscultation;normal symmetry         Cardiac: regular rhythm;no murmurs, gallops or rubs detected                pulses full and equal                                        GI:  abdomen soft;BS normoactive        Extremities and Muscular Skeletal:  no deformities, clubbing, cyanosis, erythema observed;no edema              Neurological:  affect appropriate;no gross motor deficits        Psych:  Alert and Oriented x 3        Recent Lab Results:  LIPID RESULTS:  Lab Results   Component Value Date    CHOL 165 05/11/2018    HDL 41 05/11/2018    LDL 92 05/11/2018    TRIG 159 (H) 05/11/2018    CHOLHDLRATIO 3.3 11/02/2015       LIVER ENZYME RESULTS:  Lab Results   Component Value Date    AST 30 03/26/2023    AST 68 (H) 07/08/2009    ALT 36 03/26/2023    ALT <5 (L) 05/11/2018       CBC RESULTS:  Lab Results   Component Value Date    WBC 9.5 03/26/2023    WBC 6.5 05/22/2021    RBC 4.91 03/26/2023    RBC 3.81 (L) 05/22/2021    HGB 14.2 03/26/2023    HGB 10.9 (L) 05/22/2021    HCT 41.8 03/26/2023    HCT 33.2 (L) 05/22/2021    MCV 85 03/26/2023    MCV 87 05/22/2021    MCH 28.9 03/26/2023    MCH 28.6 05/22/2021    MCHC 34.0 03/26/2023    MCHC 32.8 05/22/2021    RDW 13.1 03/26/2023    RDW 12.3 05/22/2021     03/26/2023     (L) 05/22/2021       BMP RESULTS:  Lab Results   Component Value Date     03/26/2023     05/22/2021    POTASSIUM 3.7 03/26/2023    POTASSIUM 3.5 06/05/2022    POTASSIUM 4.6 05/22/2021    CHLORIDE 102 " 03/26/2023    CHLORIDE 106 06/05/2022    CHLORIDE 106 05/22/2021    CO2 25 03/26/2023    CO2 27 06/05/2022    CO2 22 05/22/2021    ANIONGAP 11 03/26/2023    ANIONGAP 5 06/05/2022    ANIONGAP 7 05/22/2021     (H) 03/27/2023     (H) 06/05/2022     (H) 05/22/2021    BUN 18.7 03/26/2023    BUN 21 06/05/2022    BUN 50 (H) 05/22/2021    CR 1.70 (H) 03/26/2023    CR 3.56 (H) 05/22/2021    GFRESTIMATED 47 (L) 03/26/2023    GFRESTIMATED 18 (L) 05/22/2021    GFRESTBLACK 21 (L) 05/22/2021    KATIANA 9.0 03/26/2023    KATIANA 7.8 (L) 05/22/2021        A1C RESULTS:  Lab Results   Component Value Date    A1C 6.9 (H) 03/26/2023    A1C 8.8 (H) 05/21/2021       INR RESULTS:  Lab Results   Component Value Date    INR 1.01 07/07/2009       CC  Referred Self,               Thank you for allowing me to participate in the care of your patient.      Sincerely,     Namrata Irving,      [facility]

## 2024-04-12 NOTE — PROGRESS NOTES
HPI and Plan:   Ashutosh Moraes is a 58 year old male who presents with history of coronary artery disease with remote stenting to his OM in 2009, morbid obesity, hypertension, hyperlipidemia, chronic kidney disease and type 2 diabetes and sleep apnea.  He is here to reestablish care.  Last year he was admitted from the emergency department with chest pain symptoms, he ruled out for myocardial infarction and underwent a stress echocardiogram which did not demonstrate evidence for ischemia.  He was ultimately diagnosed with peptic ulcer disease and started on acid suppressant which she believes has helped.  He has not had any recurrent chest discomfort since, he denies any difficulty breathing or palpitations.  He follows with the PMD on a regular basis who monitors his diabetes as well as his cholesterol levels and he is maintained on high intensity rosuvastatin.  I do not have any recent blood work to review except from his hospitalization.  March of last year he had renal insufficiency with a creatinine 1.7.  It looks like he had a severe insult back in 2021 and his creatinine had peaked at 5.39.  He tells me his creatinine has improved even from last year.  He does not get regular exercise but he travels a lot for work and does a lot of walking through airports and such.  He has no other questions or concerns today.  Exam is unremarkable  ECG today demonstrates a normal sinus rhythm without acute ST or T wave abnormality    Summary    1.  Coronary artery disease-asymptomatic, we discussed repeating stress testing to monitor for progression of ischemia given that he is a longstanding diabetic.  He would like to proceed so I have ordered a stress echocardiogram and we will go over the test results once complete.    2.  Hyperlipidemia-he is maintained on high intensity rosuvastatin, recommend LDL near 55.  Recommend regular aerobic fitness to maintain HDL    3.  Morbid obesity-recommend portion control, heart healthy  diet and regular exercise for weight loss    I am happy to see him back at any time for any of his future cardiovascular needs, please feel free to contact me with any questions you have regards to his care         Today's clinic visit entailed:  Review of external notes as documented elsewhere in note  Ordering of each unique test    Provider  Link to MDM Help Grid     The level of medical decision making during this visit was of moderate complexity.    Orders Placed This Encounter   Procedures    EKG 12-lead complete w/read - Clinics (performed today)    Exercise Stress Echocardiogram     Orders Placed This Encounter   Medications    metoprolol succinate ER (TOPROL XL) 25 MG 24 hr tablet     Sig: Take 1 tablet by mouth daily at 2 pm    sildenafil (VIAGRA) 100 MG tablet     Sig: PLEASE SEE ATTACHED FOR DETAILED DIRECTIONS    MOUNJARO 15 MG/0.5ML pen     Sig: INJECT 0.5 ML (15 MG) SUBCUTANEOUS ONCE WEEKLY.    famotidine (PEPCID) 20 MG tablet     Sig: Take 1 tablet by mouth daily at 2 pm    ACCU-CHEK GUIDE test strip     Sig: DISPENSE ITEM COVERED BY PT INS. TEST 1 TIMES/DAY     There are no discontinued medications.      Encounter Diagnoses   Name Primary?    Coronary artery disease involving native coronary artery of native heart without angina pectoris Yes    Morbid obesity (H)     Diabetes mellitus without complication (H)        CURRENT MEDICATIONS:  Current Outpatient Medications   Medication Sig Dispense Refill    ACCU-CHEK GUIDE test strip DISPENSE ITEM COVERED BY PT INS. TEST 1 TIMES/DAY      allopurinol (ZYLOPRIM) 100 MG tablet Take 100 mg by mouth every evening      amLODIPine (NORVASC) 10 MG tablet Take 10 mg by mouth every evening      aspirin 81 MG EC tablet Take 81 mg by mouth daily       famotidine (PEPCID) 20 MG tablet Take 1 tablet by mouth daily at 2 pm      Insulin Glargine-yfgn 100 UNIT/ML SOPN Inject 50 Units Subcutaneous 2 times daily      metoprolol succinate ER (TOPROL XL) 25 MG 24 hr tablet  Take 1 tablet by mouth daily at 2 pm      MOUNJARO 15 MG/0.5ML pen INJECT 0.5 ML (15 MG) SUBCUTANEOUS ONCE WEEKLY.      omeprazole (PRILOSEC) 20 MG DR capsule Take 20 mg by mouth every evening      rosuvastatin (CRESTOR) 40 MG tablet Take 40 mg by mouth every evening      sildenafil (VIAGRA) 100 MG tablet PLEASE SEE ATTACHED FOR DETAILED DIRECTIONS      TRULICITY 4.5 MG/0.5ML SOPN Inject 4.5 mg Subcutaneous every 7 days On Wednesday (Patient not taking: Reported on 4/12/2024)      vitamin D2 (ERGOCALCIFEROL) 88738 units (1250 mcg) capsule Take 50,000 Units by mouth every 7 days On tuesdays (Patient not taking: Reported on 7/5/2023)         ALLERGIES     Allergies   Allergen Reactions    Atorvastatin Rash       PAST MEDICAL HISTORY:  Past Medical History:   Diagnosis Date    CAD (coronary artery disease)     Cardiac cath 7/2009: RAMANA to OM    Diabetes (H)     Type 2    Gastro-oesophageal reflux disease     HLD (hyperlipidemia)     Hypertension     Kidney stones     Pancreatic disease     Sleep apnea     Doesn't use a CPAP    Stented coronary artery 2009       PAST SURGICAL HISTORY:  Past Surgical History:   Procedure Laterality Date    APPENDECTOMY      COLONOSCOPY  2020    COMBINED CYSTOSCOPY, RETROGRADES, URETEROSCOPY, LASER HOLMIUM LITHOTRIPSY URETER(S), INSERT STENT Right 1/21/2021    Procedure: Cystoscopy, right ureteroscopy with laser lithotripsy, right ureteroscopy with stone basketing, right retrograde pyelogram, right ureteral stent placement, fluoroscopic interpretation <1 hour physician time;  Surgeon: Hudson Montana MD;  Location: [facility] OR    COMBINED CYSTOSCOPY, RETROGRADES, URETEROSCOPY, LASER HOLMIUM LITHOTRIPSY URETER(S), INSERT STENT Left 5/21/2021    Procedure: Cystoscopy, left retrograde pyelogram, left ureteroscopy and left ureteral stent placement;  Surgeon: Hudson Montana MD;  Location: [facility]    COMBINED CYSTOSCOPY, RETROGRADES, URETEROSCOPY, LASER HOLMIUM LITHOTRIPSY URETER(S), INSERT STENT  "Left 6/3/2021    Procedure: Cystoscopy, left ureteroscopy with stone basketing, stand by laser lithotripsy, left retrograde pyelogram, left ureteral stent exchange, fluoroscopic interpretation <1 hour physician time;  Surgeon: Hudson Montana MD;  Location: [facility] OR    COMBINED CYSTOSCOPY, RETROGRADES, URETEROSCOPY, LASER HOLMIUM LITHOTRIPSY URETER(S), INSERT STENT Right 10/28/2021    Procedure: Cystoscopy, right ureteroscopy with laser lithotripsy, right ureteroscopy with stone basketing, right retrograde pyelogram, right ureteral stent placement, fluoroscopic interpretation < 1 hour physician time;  Surgeon: Hudson Montana MD;  Location: [facility] OR    HEART CATH LEFT HEART CATH  07/02/2009    RAMANA to OM       FAMILY HISTORY:  Family History   Problem Relation Age of Onset    Asthma Mother     Coronary Artery Disease Father        SOCIAL HISTORY:  Social History     Socioeconomic History    Marital status:      Spouse name: None    Number of children: None    Years of education: None    Highest education level: None   Tobacco Use    Smoking status: Never    Smokeless tobacco: Never   Substance and Sexual Activity    Alcohol use: Yes     Alcohol/week: 0.0 standard drinks of alcohol     Comment: very rarely, once a year    Drug use: No   Other Topics Concern    Caffeine Concern No     Comment: 1 a day    Exercise No     Comment: occasional. recently joined [employer]       Review of Systems:  Skin:        Eyes:       ENT:       Respiratory:  Negative    Cardiovascular:  Negative;palpitations;chest pain;lightheadedness;dizziness;syncope or near-syncope;cyanosis;fatigue;exercise intolerance Positive for;edema  Gastroenterology:      Genitourinary:       Musculoskeletal:       Neurologic:       Psychiatric:       Heme/Lymph/Imm:       Endocrine:  Positive for diabetes    Physical Exam:  Vitals: /89   Pulse 67   Ht 1.727 m (5' 8\")   Wt 115.9 kg (255 lb 9.6 oz)   BMI 38.86 kg/m      Constitutional:  " cooperative;in no acute distress obese      Skin:  warm and dry to the touch          Head:  normocephalic, no masses or lesions        Eyes:  pupils equal and round        Lymph:      ENT:  no pallor or cyanosis        Neck:  no carotid bruit        Respiratory:  clear to auscultation;normal symmetry         Cardiac: regular rhythm;no murmurs, gallops or rubs detected                pulses full and equal                                        GI:  abdomen soft;BS normoactive        Extremities and Muscular Skeletal:  no deformities, clubbing, cyanosis, erythema observed;no edema              Neurological:  affect appropriate;no gross motor deficits        Psych:  Alert and Oriented x 3        Recent Lab Results:  LIPID RESULTS:  Lab Results   Component Value Date    CHOL 165 05/11/2018    HDL 41 05/11/2018    LDL 92 05/11/2018    TRIG 159 (H) 05/11/2018    CHOLHDLRATIO 3.3 11/02/2015       LIVER ENZYME RESULTS:  Lab Results   Component Value Date    AST 30 03/26/2023    AST 68 (H) 07/08/2009    ALT 36 03/26/2023    ALT <5 (L) 05/11/2018       CBC RESULTS:  Lab Results   Component Value Date    WBC 9.5 03/26/2023    WBC 6.5 05/22/2021    RBC 4.91 03/26/2023    RBC 3.81 (L) 05/22/2021    HGB 14.2 03/26/2023    HGB 10.9 (L) 05/22/2021    HCT 41.8 03/26/2023    HCT 33.2 (L) 05/22/2021    MCV 85 03/26/2023    MCV 87 05/22/2021    MCH 28.9 03/26/2023    MCH 28.6 05/22/2021    MCHC 34.0 03/26/2023    MCHC 32.8 05/22/2021    RDW 13.1 03/26/2023    RDW 12.3 05/22/2021     03/26/2023     (L) 05/22/2021       BMP RESULTS:  Lab Results   Component Value Date     03/26/2023     05/22/2021    POTASSIUM 3.7 03/26/2023    POTASSIUM 3.5 06/05/2022    POTASSIUM 4.6 05/22/2021    CHLORIDE 102 03/26/2023    CHLORIDE 106 06/05/2022    CHLORIDE 106 05/22/2021    CO2 25 03/26/2023    CO2 27 06/05/2022    CO2 22 05/22/2021    ANIONGAP 11 03/26/2023    ANIONGAP 5 06/05/2022    ANIONGAP 7 05/22/2021     (H)  03/27/2023     (H) 06/05/2022     (H) 05/22/2021    BUN 18.7 03/26/2023    BUN 21 06/05/2022    BUN 50 (H) 05/22/2021    CR 1.70 (H) 03/26/2023    CR 3.56 (H) 05/22/2021    GFRESTIMATED 47 (L) 03/26/2023    GFRESTIMATED 18 (L) 05/22/2021    GFRESTBLACK 21 (L) 05/22/2021    KATIANA 9.0 03/26/2023    KATIANA 7.8 (L) 05/22/2021        A1C RESULTS:  Lab Results   Component Value Date    A1C 6.9 (H) 03/26/2023    A1C 8.8 (H) 05/21/2021       INR RESULTS:  Lab Results   Component Value Date    INR 1.01 07/07/2009           CC  Referred Self, MD  No address on file

## 2024-04-29 ENCOUNTER — HOSPITAL ENCOUNTER (OUTPATIENT)
Dept: CARDIOLOGY | Facility: CLINIC | Age: 59
Discharge: HOME | End: 2024-04-29
Attending: INTERNAL MEDICINE | Admitting: INTERNAL MEDICINE
Payer: COMMERCIAL

## 2024-04-29 DIAGNOSIS — E66.01 MORBID OBESITY (H): ICD-10-CM

## 2024-04-29 DIAGNOSIS — I25.10 CORONARY ARTERY DISEASE INVOLVING NATIVE CORONARY ARTERY OF NATIVE HEART WITHOUT ANGINA PECTORIS: ICD-10-CM

## 2024-04-29 DIAGNOSIS — E11.9 DIABETES MELLITUS WITHOUT COMPLICATION (H): ICD-10-CM

## 2024-04-29 PROCEDURE — 93321 DOPPLER ECHO F-UP/LMTD STD: CPT | Mod: 26 | Performed by: INTERNAL MEDICINE

## 2024-04-29 PROCEDURE — 93350 STRESS TTE ONLY: CPT | Mod: 26 | Performed by: INTERNAL MEDICINE

## 2024-04-29 PROCEDURE — 999N000208 ECHO STRESS ECHOCARDIOGRAM

## 2024-04-29 PROCEDURE — 93016 CV STRESS TEST SUPVJ ONLY: CPT | Performed by: INTERNAL MEDICINE

## 2024-04-29 PROCEDURE — 93325 DOPPLER ECHO COLOR FLOW MAPG: CPT | Mod: 26 | Performed by: INTERNAL MEDICINE

## 2024-04-29 PROCEDURE — 93018 CV STRESS TEST I&R ONLY: CPT | Performed by: INTERNAL MEDICINE

## 2024-04-29 PROCEDURE — 255N000002 HC RX 255 OP 636: Performed by: INTERNAL MEDICINE

## 2024-04-29 RX ADMIN — HUMAN ALBUMIN MICROSPHERES AND PERFLUTREN 3 ML: 10; .22 INJECTION, SOLUTION INTRAVENOUS at 10:20

## 2024-05-25 ENCOUNTER — HEALTH MAINTENANCE LETTER (OUTPATIENT)
Age: 59
End: 2024-05-25

## 2024-06-06 NOTE — TELEPHONE ENCOUNTER
LOV 3/7/17. RN refilled rx per RN refill protocol.      She is on appropriate antibiotics that Dr. Groves prescribed.  The burning may be due to her vaginal atrophy so she should be using the cream (Estrace) too if she is not.    The other thing we can do is maybe some more pyridium.  I will send that to her pharmacy to help with the burning for 5 days.  If she still feels this way next week after a few doses, I would have her come back to see Dr. Groves.    I would avoid Uribel given her age.

## 2024-10-12 ENCOUNTER — HEALTH MAINTENANCE LETTER (OUTPATIENT)
Age: 59
End: 2024-10-12

## 2024-12-15 ENCOUNTER — APPOINTMENT (OUTPATIENT)
Dept: GENERAL RADIOLOGY | Facility: CLINIC | Age: 59
End: 2024-12-15
Attending: PHYSICIAN ASSISTANT
Payer: COMMERCIAL

## 2024-12-15 ENCOUNTER — HOSPITAL ENCOUNTER (EMERGENCY)
Facility: CLINIC | Age: 59
Discharge: HOME | End: 2024-12-15
Attending: PHYSICIAN ASSISTANT | Admitting: PHYSICIAN ASSISTANT
Payer: COMMERCIAL

## 2024-12-15 VITALS
BODY MASS INDEX: 38.22 KG/M2 | WEIGHT: 252.21 LBS | HEART RATE: 60 BPM | DIASTOLIC BLOOD PRESSURE: 89 MMHG | OXYGEN SATURATION: 94 % | TEMPERATURE: 97 F | HEIGHT: 68 IN | RESPIRATION RATE: 17 BRPM | SYSTOLIC BLOOD PRESSURE: 139 MMHG

## 2024-12-15 DIAGNOSIS — R07.89 ATYPICAL CHEST PAIN: ICD-10-CM

## 2024-12-15 LAB
ANION GAP SERPL CALCULATED.3IONS-SCNC: 13 MMOL/L (ref 7–15)
BASOPHILS # BLD AUTO: 0.1 10E3/UL (ref 0–0.2)
BASOPHILS NFR BLD AUTO: 1 %
BUN SERPL-MCNC: 18.1 MG/DL (ref 6–20)
CALCIUM SERPL-MCNC: 8.8 MG/DL (ref 8.8–10.4)
CHLORIDE SERPL-SCNC: 104 MMOL/L (ref 98–107)
CREAT SERPL-MCNC: 1.71 MG/DL (ref 0.67–1.17)
D DIMER PPP FEU-MCNC: <0.27 UG/ML FEU (ref 0–0.5)
EGFRCR SERPLBLD CKD-EPI 2021: 46 ML/MIN/1.73M2
EOSINOPHIL # BLD AUTO: 0.3 10E3/UL (ref 0–0.7)
EOSINOPHIL NFR BLD AUTO: 4 %
ERYTHROCYTE [DISTWIDTH] IN BLOOD BY AUTOMATED COUNT: 13.1 % (ref 10–15)
GLUCOSE SERPL-MCNC: 131 MG/DL (ref 70–99)
HCO3 SERPL-SCNC: 24 MMOL/L (ref 22–29)
HCT VFR BLD AUTO: 44.3 % (ref 40–53)
HGB BLD-MCNC: 14.9 G/DL (ref 13.3–17.7)
HOLD SPECIMEN: NORMAL
IMM GRANULOCYTES # BLD: 0 10E3/UL
IMM GRANULOCYTES NFR BLD: 0 %
LYMPHOCYTES # BLD AUTO: 2 10E3/UL (ref 0.8–5.3)
LYMPHOCYTES NFR BLD AUTO: 26 %
MCH RBC QN AUTO: 28.4 PG (ref 26.5–33)
MCHC RBC AUTO-ENTMCNC: 33.6 G/DL (ref 31.5–36.5)
MCV RBC AUTO: 84 FL (ref 78–100)
MONOCYTES # BLD AUTO: 0.6 10E3/UL (ref 0–1.3)
MONOCYTES NFR BLD AUTO: 8 %
NEUTROPHILS # BLD AUTO: 4.8 10E3/UL (ref 1.6–8.3)
NEUTROPHILS NFR BLD AUTO: 61 %
NRBC # BLD AUTO: 0 10E3/UL
NRBC BLD AUTO-RTO: 0 /100
PLATELET # BLD AUTO: 186 10E3/UL (ref 150–450)
POTASSIUM SERPL-SCNC: 3.8 MMOL/L (ref 3.4–5.3)
RBC # BLD AUTO: 5.25 10E6/UL (ref 4.4–5.9)
SODIUM SERPL-SCNC: 141 MMOL/L (ref 135–145)
TROPONIN T SERPL HS-MCNC: 10 NG/L
TROPONIN T SERPL HS-MCNC: 11 NG/L
WBC # BLD AUTO: 7.9 10E3/UL (ref 4–11)

## 2024-12-15 PROCEDURE — 36415 COLL VENOUS BLD VENIPUNCTURE: CPT | Performed by: PHYSICIAN ASSISTANT

## 2024-12-15 PROCEDURE — 84484 ASSAY OF TROPONIN QUANT: CPT | Performed by: PHYSICIAN ASSISTANT

## 2024-12-15 PROCEDURE — 85004 AUTOMATED DIFF WBC COUNT: CPT | Performed by: PHYSICIAN ASSISTANT

## 2024-12-15 PROCEDURE — 99285 EMERGENCY DEPT VISIT HI MDM: CPT | Mod: 25 | Performed by: PHYSICIAN ASSISTANT

## 2024-12-15 PROCEDURE — 85379 FIBRIN DEGRADATION QUANT: CPT | Performed by: PHYSICIAN ASSISTANT

## 2024-12-15 PROCEDURE — 80048 BASIC METABOLIC PNL TOTAL CA: CPT | Performed by: PHYSICIAN ASSISTANT

## 2024-12-15 PROCEDURE — 93005 ELECTROCARDIOGRAM TRACING: CPT | Performed by: PHYSICIAN ASSISTANT

## 2024-12-15 PROCEDURE — 71046 X-RAY EXAM CHEST 2 VIEWS: CPT

## 2024-12-15 ASSESSMENT — COLUMBIA-SUICIDE SEVERITY RATING SCALE - C-SSRS
6. HAVE YOU EVER DONE ANYTHING, STARTED TO DO ANYTHING, OR PREPARED TO DO ANYTHING TO END YOUR LIFE?: NO
1. IN THE PAST MONTH, HAVE YOU WISHED YOU WERE DEAD OR WISHED YOU COULD GO TO SLEEP AND NOT WAKE UP?: NO
2. HAVE YOU ACTUALLY HAD ANY THOUGHTS OF KILLING YOURSELF IN THE PAST MONTH?: NO

## 2024-12-15 ASSESSMENT — ACTIVITIES OF DAILY LIVING (ADL)
ADLS_ACUITY_SCORE: 52

## 2024-12-15 NOTE — ED PROVIDER NOTES
Emergency Department Note      History of Present Illness     Chief Complaint   Chest Pain      HPI   Ashutosh Moraes is a 59 year old male with a history of CAD, type 2 diabetes mellitus, hyperlipidemia, and hypertension presenting to the ED for the evaluation of chest pain. The patient reports that he was on a flight home from [place] around 0800 yesterday morning (12/14/24) when he began experiencing left-sided chest pain and back soreness. When he arrived home, he states that he took a nap and then felt better. He reports that while the pain has always been there, it has improved and worsened over time depending on his activities. For example, he notes that the pain seems to worsen when sitting, but that it improves when walking around or laying flat. This morning he woke up feeling a little better, but the pain worsened again when he sat at his desk. Bill also endorses some difficulty breathing upon deep inspiration. He denies vomiting, dizziness, cough, fever, pain with breathing, or numbness in his arms or legs. He also denies any smoking. Pain does not radiate and has not been constant.  No hx of aortic problems.  No leg swelling/calf pain.  No prior hx of thromboembolism.  Of note, he mentions that he was sick 2 weeks ago. He also discussed his history of stent placement in 2009 and current diabetes and neuropathy.    Independent Historian   None    Review of External Notes   Reviewed Dr. Carrion and [facility] cardiology note from 4/29/2024.  Patient underwent stress echocardiogram at that time that was normal.  Cardiology did not feel invasive angiography was warranted.  Recommended continuing medical therapy.  I also reviewed his angiogram from 2009 which showed obstructive disease in the obtuse marginal with limited CAD to other vessels.    Past Medical History     Medical History and Problem List   SHELLI  CAD  Type 2 diabetes mellitus  GERD  Hyperlipidemia  Hypertension  Kidney stones  Morbid  obesity  Pancreatic disease  Ureteral stones  Sleep apnea  Stented coronary artery    Medications   Allopurinol  Amlodipine  Aspirin  Famotidine  Insulin  Lisinopril  Metoprolol succinate  Mounjaro  Omeprazole  Ondansetron  Pravastatin  Rosuvastatin  Sildenafil  Tamsulosin  Trulicity    Surgical History   Past Surgical History:   Procedure Laterality Date    APPENDECTOMY      COLONOSCOPY  2020    COMBINED CYSTOSCOPY, RETROGRADES, URETEROSCOPY, LASER HOLMIUM LITHOTRIPSY URETER(S), INSERT STENT Right 1/21/2021    Procedure: Cystoscopy, right ureteroscopy with laser lithotripsy, right ureteroscopy with stone basketing, right retrograde pyelogram, right ureteral stent placement, fluoroscopic interpretation <1 hour physician time;  Surgeon: Hudson Montana MD;  Location: [facility] OR    COMBINED CYSTOSCOPY, RETROGRADES, URETEROSCOPY, LASER HOLMIUM LITHOTRIPSY URETER(S), INSERT STENT Left 5/21/2021    Procedure: Cystoscopy, left retrograde pyelogram, left ureteroscopy and left ureteral stent placement;  Surgeon: Hudson Montana MD;  Location: [facility] OR    COMBINED CYSTOSCOPY, RETROGRADES, URETEROSCOPY, LASER HOLMIUM LITHOTRIPSY URETER(S), INSERT STENT Left 6/3/2021    Procedure: Cystoscopy, left ureteroscopy with stone basketing, stand by laser lithotripsy, left retrograde pyelogram, left ureteral stent exchange, fluoroscopic interpretation <1 hour physician time;  Surgeon: Hudson Montana MD;  Location: [facility]    COMBINED CYSTOSCOPY, RETROGRADES, URETEROSCOPY, LASER HOLMIUM LITHOTRIPSY URETER(S), INSERT STENT Right 10/28/2021    Procedure: Cystoscopy, right ureteroscopy with laser lithotripsy, right ureteroscopy with stone basketing, right retrograde pyelogram, right ureteral stent placement, fluoroscopic interpretation < 1 hour physician time;  Surgeon: Hudson Montana MD;  Location: [facility] OR    HEART CATH LEFT HEART CATH  07/02/2009    RAMANA to OM     Physical Exam     Patient Vitals for the past 24 hrs:   BP Temp Temp src  "Pulse Resp SpO2 Height Weight   12/15/24 1432 139/89 -- -- 60 -- 94 % -- --   12/15/24 1402 (!) 144/88 -- -- 62 -- 96 % -- --   12/15/24 1221 -- -- -- -- -- 94 % -- --   12/15/24 1206 (!) 140/95 -- -- 66 -- 97 % -- --   12/15/24 1154 (!) 172/110 97  F (36.1  C) Temporal 70 17 99 % 1.727 m (5' 8\") 114.4 kg (252 lb 3.3 oz)     Physical Exam  General: Awake, alert, non-toxic.  Head:  Scalp is NC/AT  Eyes:  Conjunctiva normal, PERRL  ENT:  The external nose and ears are normal.   Neck:  Normal range of motion without rigidity.  CV:  Regular rate and rhythm    No pathologic murmur, rubs, or gallops.  Resp:  Breath sounds are clear bilaterally    Non-labored, no retractions or accessory muscle use  Abdomen: Abdomen is soft, no distension, no tenderness, no masses. No CVA tenderness.  MS:  No lower extremity edema/swelling. No midline cervical, thoracic, or lumbar tenderness.   Skin:  Warm and dry, No rash or lesions noted. 2+ radial pulses BL.  Extremities warm and well perfused.  Neuro:  Alert and oriented.  GCS 15 Moves all extremities normal.  No facial asymmetry. Gait normal.  Psych:  Awake. Alert. Normal affect. Appropriate interactions.      Diagnostics     Lab Results   Labs Ordered and Resulted from Time of ED Arrival to Time of ED Departure   BASIC METABOLIC PANEL - Abnormal       Result Value    Sodium 141      Potassium 3.8      Chloride 104      Carbon Dioxide (CO2) 24      Anion Gap 13      Urea Nitrogen 18.1      Creatinine 1.71 (*)     GFR Estimate 46 (*)     Calcium 8.8      Glucose 131 (*)    TROPONIN T, HIGH SENSITIVITY - Normal    Troponin T, High Sensitivity 10     D DIMER QUANTITATIVE - Normal    D-Dimer Quantitative <0.27     TROPONIN T, HIGH SENSITIVITY - Normal    Troponin T, High Sensitivity 11     CBC WITH PLATELETS AND DIFFERENTIAL    WBC Count 7.9      RBC Count 5.25      Hemoglobin 14.9      Hematocrit 44.3      MCV 84      MCH 28.4      MCHC 33.6      RDW 13.1      Platelet Count 186      % " Neutrophils 61      % Lymphocytes 26      % Monocytes 8      % Eosinophils 4      % Basophils 1      % Immature Granulocytes 0      NRBCs per 100 WBC 0      Absolute Neutrophils 4.8      Absolute Lymphocytes 2.0      Absolute Monocytes 0.6      Absolute Eosinophils 0.3      Absolute Basophils 0.1      Absolute Immature Granulocytes 0.0      Absolute NRBCs 0.0         Imaging   Chest XR,  PA & LAT   Final Result   IMPRESSION: Cardiac silhouette size is within normal limits. Subsegmental atelectasis, left lung base. Lungs are otherwise clear. No displaced fractures of the visualized portions of the ribs. Hypertrophic changes, mid to lower thoracic spine.          EKG    ECG taken at 1202, ECG read at 1223  Sinus bradycardia  Minimal voltage criteria for LVH, may be normal variant  Borderline ECG. No significant change from prior.  Interpreted by ED attending Dr. Haider.  Rate 59 bpm. ME interval 168 ms. QRS duration 102 ms. QT/QTc 416/411 ms. P-R-T axes 27 -11 29.    Independent Interpretation   CXR: No pneumothorax, infiltrate, pleural effusion, cardiomegaly, or mediastinal widening.    ED Course      Medications Administered   Medications - No data to display    Procedures   Procedures     Discussion of Management   None    ED Course   ED Course as of 12/16/24 0918   Sun Dec 15, 2024   1212 I obtained the history and examined the patient as noted above.          Additional Documentation  None    Medical Decision Making / Diagnosis     CMS Diagnoses: None    MIPS       None    MDM   59 year old male who presents with chest pain.  Patient history and records reviewed. Broad differential considered including: ACS, PE, aortic dissection, myocarditis, pericarditis, pneumothorax, pneumonia, esophageal rupture, musculoskeletal chest wall pain, referred pain from abdomen.  Patient well appearing with non-concerning vitals.  EKG without evidence of acute ischemia, signs of pericarditis, or arrythmia. HS troponin is normal x2  his symptoms do not sound cardiac in nature and I feel ACS or myocarditis is unlikely and sx do not sound like pericarditis given better lying down.  D-dimer negative and low risk for PE by Well's criteria and would not pursue further workup at this time.  Chest x-ray shows no evidence of pneumonia, free air, pneumothorax, CHF, or mediastinal widening shows slight atelectasis LLB o/w normal no sx of pneumonia would not antibiose and lungs CTAB on exam.  No other high risk factors present to suggest aortic dissection, and feel this is very unlikely at this time.  Abdominal examination non-tender and doubt referred pain from intra-abdominal catastrophe, pancreatitis, gallbladder pathology.    Likely msk vs pleurisy.  Advised discussing w/his primary and cardiology for follow-up given his known hx of CAD but he is comfortable w/discharge home at this time and I support this.  Return precautions were strictly given for any new worsening or changing symptoms, excertional pain, vomiting, dizziness, radiation, breathlessness or other concerns.      Disposition   The patient was discharged.     Diagnosis     ICD-10-CM    1. Atypical chest pain  R07.89            Discharge Medications   Discharge Medication List as of 12/15/2024  4:41 PM            Scribe Disclosure:  I, Cindy Rawls, am serving as a scribe at 12:28 PM on 12/15/2024 to document services personally performed by Armando Hartmann PA-C based on my observations and the provider's statements to me.        Armando Hartmann PA-C  12/16/24 0922

## 2024-12-15 NOTE — ED TRIAGE NOTES
Pt complains of L sided chest/epigastric pain that radiates into the back that started yesterday while on a flight from [place]. Pt has hx of stent placement. No sob. No thinners. Pt states sitting makes the pain worse.

## 2024-12-16 LAB
ATRIAL RATE - MUSE: 59 BPM
DIASTOLIC BLOOD PRESSURE - MUSE: NORMAL MMHG
INTERPRETATION ECG - MUSE: NORMAL
P AXIS - MUSE: 27 DEGREES
PR INTERVAL - MUSE: 168 MS
QRS DURATION - MUSE: 102 MS
QT - MUSE: 416 MS
QTC - MUSE: 411 MS
R AXIS - MUSE: -11 DEGREES
SYSTOLIC BLOOD PRESSURE - MUSE: NORMAL MMHG
T AXIS - MUSE: 29 DEGREES
VENTRICULAR RATE- MUSE: 59 BPM

## 2025-01-25 ENCOUNTER — HEALTH MAINTENANCE LETTER (OUTPATIENT)
Age: 60
End: 2025-01-25

## 2025-04-17 DIAGNOSIS — N20.0 CALCULUS OF KIDNEY: Primary | ICD-10-CM

## 2025-04-24 ENCOUNTER — HOSPITAL ENCOUNTER (OUTPATIENT)
Dept: CT IMAGING | Facility: CLINIC | Age: 60
Discharge: HOME | End: 2025-04-24
Attending: STUDENT IN AN ORGANIZED HEALTH CARE EDUCATION/TRAINING PROGRAM
Payer: COMMERCIAL

## 2025-04-24 DIAGNOSIS — N20.0 CALCULUS OF KIDNEY: ICD-10-CM

## 2025-04-24 PROCEDURE — 74176 CT ABD & PELVIS W/O CONTRAST: CPT

## 2025-05-03 ENCOUNTER — HEALTH MAINTENANCE LETTER (OUTPATIENT)
Age: 60
End: 2025-05-03

## 2025-06-14 ENCOUNTER — HEALTH MAINTENANCE LETTER (OUTPATIENT)
Age: 60
End: 2025-06-14

## 2025-08-07 ENCOUNTER — VIRTUAL VISIT (OUTPATIENT)
Dept: UROLOGY | Facility: CLINIC | Age: 60
End: 2025-08-07
Payer: COMMERCIAL

## 2025-08-07 DIAGNOSIS — N20.0 NEPHROLITHIASIS: Primary | ICD-10-CM

## 2025-08-16 ENCOUNTER — HEALTH MAINTENANCE LETTER (OUTPATIENT)
Age: 60
End: 2025-08-16

## (undated) DEVICE — TUBING IRRIG TUR Y TYPE 96" LF 6543-01

## (undated) DEVICE — GUIDEWIRE URO STR STIFF .035"X150CM NITINOL 150NSS35

## (undated) DEVICE — Device

## (undated) DEVICE — LINEN FULL SHEET 5511

## (undated) DEVICE — LINEN HALF SHEET 5512

## (undated) DEVICE — PREP POVIDONE-IODINE 7.5% SCRUB 4OZ BOTTLE MDS093945

## (undated) DEVICE — FLEXIVA 365 LASER FIBER

## (undated) DEVICE — SOL NACL 0.9% IRRIG 3000ML BAG 2B7477

## (undated) DEVICE — GLOVE PROTEXIS MICRO 7.0  2D73PM70

## (undated) DEVICE — GLOVE PROTEXIS BLUE W/NEU-THERA 7.0  2D73EB70

## (undated) DEVICE — CATH URETERAL FLEX TIP TIGERTAIL 06FRX70CM 139006

## (undated) DEVICE — BAG CLEAR TRASH 1.3M 39X33" P4040C

## (undated) DEVICE — PREP POVIDONE IODINE SCRUB 7.5% 120ML

## (undated) DEVICE — SOL WATER IRRIG 1000ML BOTTLE 2F7114

## (undated) DEVICE — GUIDEWIRE SENSOR DUAL FLEX STR 0.035"X150CM M0066703080

## (undated) DEVICE — COVER FOOTSWITCH W/CINCH 20X24" 923267

## (undated) DEVICE — GLOVE PROTEXIS BLUE W/NEU-THERA 6.0  2D73EB60

## (undated) DEVICE — BASKET NITINOL TIPLESS HALO  1.5FRX120CM 554120

## (undated) DEVICE — PAD CHUX UNDERPAD 23X24" 7136

## (undated) DEVICE — PACK CYSTO CUSTOM RIDGES

## (undated) DEVICE — UROSEAL ADJUSTABLE ENDOSCOPIC VALVE

## (undated) DEVICE — RAD RX ISOVUE 300 (50ML) 61% IOPAMIDOL CHARGE PER ML

## (undated) DEVICE — GLOVE PROTEXIS POWDER FREE 6.0 ORTHOPEDIC 2D73ET60

## (undated) DEVICE — PREP CHLORHEXIDINE 4% 4OZ (HIBICLENS) 57504

## (undated) DEVICE — BAG URINARY DRAIN 4000ML LF 153509

## (undated) DEVICE — PREP POVIDONE IODINE SOLUTION 10% 120ML

## (undated) DEVICE — PREP POVIDONE IODINE SOLUTION 10% 4OZ BOTTLE 29906-004

## (undated) RX ORDER — GLYCOPYRROLATE 0.2 MG/ML
INJECTION INTRAMUSCULAR; INTRAVENOUS
Status: DISPENSED
Start: 2021-06-03

## (undated) RX ORDER — HYDRALAZINE HYDROCHLORIDE 20 MG/ML
INJECTION INTRAMUSCULAR; INTRAVENOUS
Status: DISPENSED
Start: 2021-10-28

## (undated) RX ORDER — DEXAMETHASONE SODIUM PHOSPHATE 4 MG/ML
INJECTION, SOLUTION INTRA-ARTICULAR; INTRALESIONAL; INTRAMUSCULAR; INTRAVENOUS; SOFT TISSUE
Status: DISPENSED
Start: 2021-05-21

## (undated) RX ORDER — ATROPA BELLADONNA AND OPIUM 16.2; 3 MG/1; MG/1
SUPPOSITORY RECTAL
Status: DISPENSED
Start: 2021-10-28

## (undated) RX ORDER — LABETALOL HYDROCHLORIDE 5 MG/ML
INJECTION, SOLUTION INTRAVENOUS
Status: DISPENSED
Start: 2021-10-28

## (undated) RX ORDER — PROPOFOL 10 MG/ML
INJECTION, EMULSION INTRAVENOUS
Status: DISPENSED
Start: 2021-06-03

## (undated) RX ORDER — ATROPA BELLADONNA AND OPIUM 16.2; 3 MG/1; MG/1
SUPPOSITORY RECTAL
Status: DISPENSED
Start: 2021-06-03

## (undated) RX ORDER — LIDOCAINE HYDROCHLORIDE 10 MG/ML
INJECTION, SOLUTION EPIDURAL; INFILTRATION; INTRACAUDAL; PERINEURAL
Status: DISPENSED
Start: 2021-06-03

## (undated) RX ORDER — FENTANYL CITRATE 50 UG/ML
INJECTION, SOLUTION INTRAMUSCULAR; INTRAVENOUS
Status: DISPENSED
Start: 2021-05-21

## (undated) RX ORDER — ONDANSETRON 2 MG/ML
INJECTION INTRAMUSCULAR; INTRAVENOUS
Status: DISPENSED
Start: 2021-05-21

## (undated) RX ORDER — TAMSULOSIN HYDROCHLORIDE 0.4 MG/1
CAPSULE ORAL
Status: DISPENSED
Start: 2021-01-21

## (undated) RX ORDER — FENTANYL CITRATE 50 UG/ML
INJECTION, SOLUTION INTRAMUSCULAR; INTRAVENOUS
Status: DISPENSED
Start: 2021-06-03

## (undated) RX ORDER — ATROPA BELLADONNA AND OPIUM 16.2; 3 MG/1; MG/1
SUPPOSITORY RECTAL
Status: DISPENSED
Start: 2021-01-21

## (undated) RX ORDER — CEFAZOLIN SODIUM 2 G/100ML
INJECTION, SOLUTION INTRAVENOUS
Status: DISPENSED
Start: 2021-06-03

## (undated) RX ORDER — ONDANSETRON 2 MG/ML
INJECTION INTRAMUSCULAR; INTRAVENOUS
Status: DISPENSED
Start: 2021-06-03

## (undated) RX ORDER — DEXAMETHASONE SODIUM PHOSPHATE 4 MG/ML
INJECTION, SOLUTION INTRA-ARTICULAR; INTRALESIONAL; INTRAMUSCULAR; INTRAVENOUS; SOFT TISSUE
Status: DISPENSED
Start: 2021-01-21

## (undated) RX ORDER — ONDANSETRON 2 MG/ML
INJECTION INTRAMUSCULAR; INTRAVENOUS
Status: DISPENSED
Start: 2021-01-21

## (undated) RX ORDER — LIDOCAINE HYDROCHLORIDE 10 MG/ML
INJECTION, SOLUTION EPIDURAL; INFILTRATION; INTRACAUDAL; PERINEURAL
Status: DISPENSED
Start: 2021-05-21

## (undated) RX ORDER — PROPOFOL 10 MG/ML
INJECTION, EMULSION INTRAVENOUS
Status: DISPENSED
Start: 2021-01-21

## (undated) RX ORDER — DEXAMETHASONE SODIUM PHOSPHATE 4 MG/ML
INJECTION, SOLUTION INTRA-ARTICULAR; INTRALESIONAL; INTRAMUSCULAR; INTRAVENOUS; SOFT TISSUE
Status: DISPENSED
Start: 2021-10-28

## (undated) RX ORDER — LIDOCAINE HYDROCHLORIDE 10 MG/ML
INJECTION, SOLUTION EPIDURAL; INFILTRATION; INTRACAUDAL; PERINEURAL
Status: DISPENSED
Start: 2021-10-28

## (undated) RX ORDER — LABETALOL HYDROCHLORIDE 5 MG/ML
INJECTION, SOLUTION INTRAVENOUS
Status: DISPENSED
Start: 2021-06-03

## (undated) RX ORDER — LIDOCAINE HYDROCHLORIDE 10 MG/ML
INJECTION, SOLUTION EPIDURAL; INFILTRATION; INTRACAUDAL; PERINEURAL
Status: DISPENSED
Start: 2021-01-21

## (undated) RX ORDER — PROPOFOL 10 MG/ML
INJECTION, EMULSION INTRAVENOUS
Status: DISPENSED
Start: 2021-10-28

## (undated) RX ORDER — HYDROMORPHONE HYDROCHLORIDE 1 MG/ML
INJECTION, SOLUTION INTRAMUSCULAR; INTRAVENOUS; SUBCUTANEOUS
Status: DISPENSED
Start: 2021-05-21

## (undated) RX ORDER — FENTANYL CITRATE 50 UG/ML
INJECTION, SOLUTION INTRAMUSCULAR; INTRAVENOUS
Status: DISPENSED
Start: 2021-01-21

## (undated) RX ORDER — ACETAMINOPHEN 325 MG/1
TABLET ORAL
Status: DISPENSED
Start: 2021-01-21

## (undated) RX ORDER — PROPOFOL 10 MG/ML
INJECTION, EMULSION INTRAVENOUS
Status: DISPENSED
Start: 2021-05-21

## (undated) RX ORDER — ATROPA BELLADONNA AND OPIUM 16.2; 3 MG/1; MG/1
SUPPOSITORY RECTAL
Status: DISPENSED
Start: 2021-05-21

## (undated) RX ORDER — CEFAZOLIN SODIUM/WATER 2 G/20 ML
SYRINGE (ML) INTRAVENOUS
Status: DISPENSED
Start: 2021-10-28

## (undated) RX ORDER — ONDANSETRON 2 MG/ML
INJECTION INTRAMUSCULAR; INTRAVENOUS
Status: DISPENSED
Start: 2021-10-28

## (undated) RX ORDER — FENTANYL CITRATE 50 UG/ML
INJECTION, SOLUTION INTRAMUSCULAR; INTRAVENOUS
Status: DISPENSED
Start: 2021-10-28

## (undated) RX ORDER — CEFAZOLIN SODIUM 1 G/3ML
INJECTION, POWDER, FOR SOLUTION INTRAMUSCULAR; INTRAVENOUS
Status: DISPENSED
Start: 2021-10-28

## (undated) RX ORDER — CEFAZOLIN SODIUM 2 G/100ML
INJECTION, SOLUTION INTRAVENOUS
Status: DISPENSED
Start: 2021-01-21

## (undated) RX ORDER — FENTANYL CITRATE-0.9 % NACL/PF 10 MCG/ML
PLASTIC BAG, INJECTION (ML) INTRAVENOUS
Status: DISPENSED
Start: 2021-01-21

## (undated) RX ORDER — FENTANYL CITRATE-0.9 % NACL/PF 10 MCG/ML
PLASTIC BAG, INJECTION (ML) INTRAVENOUS
Status: DISPENSED
Start: 2021-05-21

## (undated) RX ORDER — DEXAMETHASONE SODIUM PHOSPHATE 4 MG/ML
INJECTION, SOLUTION INTRA-ARTICULAR; INTRALESIONAL; INTRAMUSCULAR; INTRAVENOUS; SOFT TISSUE
Status: DISPENSED
Start: 2021-06-03